# Patient Record
Sex: FEMALE | Race: WHITE | Employment: UNEMPLOYED | URBAN - METROPOLITAN AREA
[De-identification: names, ages, dates, MRNs, and addresses within clinical notes are randomized per-mention and may not be internally consistent; named-entity substitution may affect disease eponyms.]

---

## 2019-01-28 ENCOUNTER — TELEPHONE (OUTPATIENT)
Dept: OBGYN CLINIC | Age: 34
End: 2019-01-28

## 2019-02-19 ENCOUNTER — HOSPITAL ENCOUNTER (EMERGENCY)
Age: 34
Discharge: HOME | End: 2019-02-19
Attending: EMERGENCY MEDICINE
Payer: COMMERCIAL

## 2019-02-19 ENCOUNTER — APPOINTMENT (OUTPATIENT)
Dept: GENERAL RADIOLOGY | Age: 34
End: 2019-02-19
Payer: COMMERCIAL

## 2019-02-19 VITALS
WEIGHT: 160 LBS | OXYGEN SATURATION: 100 % | SYSTOLIC BLOOD PRESSURE: 112 MMHG | HEIGHT: 64 IN | BODY MASS INDEX: 27.31 KG/M2 | HEART RATE: 65 BPM | RESPIRATION RATE: 17 BRPM | TEMPERATURE: 98.4 F | DIASTOLIC BLOOD PRESSURE: 60 MMHG

## 2019-02-19 DIAGNOSIS — R07.89 CHEST WALL PAIN: Primary | ICD-10-CM

## 2019-02-19 LAB
ALBUMIN SERPL-MCNC: 4.3 G/DL (ref 3.5–4.6)
ALP BLD-CCNC: 82 U/L (ref 40–130)
ALT SERPL-CCNC: 12 U/L (ref 0–33)
ANION GAP SERPL CALCULATED.3IONS-SCNC: 11 MEQ/L (ref 9–15)
APTT: 24.3 SEC (ref 21.6–35.4)
AST SERPL-CCNC: 20 U/L (ref 0–35)
BASOPHILS ABSOLUTE: 0 K/UL (ref 0–0.2)
BASOPHILS RELATIVE PERCENT: 0.5 %
BILIRUB SERPL-MCNC: <0.2 MG/DL (ref 0.2–0.7)
BUN BLDV-MCNC: 13 MG/DL (ref 6–20)
CALCIUM SERPL-MCNC: 9 MG/DL (ref 8.5–9.9)
CHLORIDE BLD-SCNC: 102 MEQ/L (ref 95–107)
CO2: 28 MEQ/L (ref 20–31)
CREAT SERPL-MCNC: 0.65 MG/DL (ref 0.5–0.9)
D DIMER: <0.19 MG/L FEU (ref 0–0.5)
EKG ATRIAL RATE: 65 BPM
EKG P AXIS: 54 DEGREES
EKG P-R INTERVAL: 176 MS
EKG Q-T INTERVAL: 376 MS
EKG QRS DURATION: 84 MS
EKG QTC CALCULATION (BAZETT): 391 MS
EKG R AXIS: 44 DEGREES
EKG T AXIS: 26 DEGREES
EKG VENTRICULAR RATE: 65 BPM
EOSINOPHILS ABSOLUTE: 0.1 K/UL (ref 0–0.7)
EOSINOPHILS RELATIVE PERCENT: 1.6 %
GFR AFRICAN AMERICAN: >60
GFR NON-AFRICAN AMERICAN: >60
GLOBULIN: 3.1 G/DL (ref 2.3–3.5)
GLUCOSE BLD-MCNC: 97 MG/DL (ref 70–99)
HCT VFR BLD CALC: 36.7 % (ref 37–47)
HEMOGLOBIN: 12.3 G/DL (ref 12–16)
INR BLD: 1
LYMPHOCYTES ABSOLUTE: 1.8 K/UL (ref 1–4.8)
LYMPHOCYTES RELATIVE PERCENT: 23.4 %
MCH RBC QN AUTO: 28.8 PG (ref 27–31.3)
MCHC RBC AUTO-ENTMCNC: 33.4 % (ref 33–37)
MCV RBC AUTO: 86.2 FL (ref 82–100)
MONOCYTES ABSOLUTE: 0.5 K/UL (ref 0.2–0.8)
MONOCYTES RELATIVE PERCENT: 6.1 %
NEUTROPHILS ABSOLUTE: 5.2 K/UL (ref 1.4–6.5)
NEUTROPHILS RELATIVE PERCENT: 68.4 %
PDW BLD-RTO: 15.3 % (ref 11.5–14.5)
PLATELET # BLD: 199 K/UL (ref 130–400)
POTASSIUM SERPL-SCNC: 3.6 MEQ/L (ref 3.4–4.9)
PROTHROMBIN TIME: 10.3 SEC (ref 9–11.5)
RBC # BLD: 4.25 M/UL (ref 4.2–5.4)
SODIUM BLD-SCNC: 141 MEQ/L (ref 135–144)
TOTAL CK: 78 U/L (ref 0–170)
TOTAL PROTEIN: 7.4 G/DL (ref 6.3–8)
TROPONIN: <0.01 NG/ML (ref 0–0.01)
WBC # BLD: 7.5 K/UL (ref 4.8–10.8)

## 2019-02-19 PROCEDURE — 36415 COLL VENOUS BLD VENIPUNCTURE: CPT

## 2019-02-19 PROCEDURE — 71045 X-RAY EXAM CHEST 1 VIEW: CPT

## 2019-02-19 PROCEDURE — 85730 THROMBOPLASTIN TIME PARTIAL: CPT

## 2019-02-19 PROCEDURE — 85379 FIBRIN DEGRADATION QUANT: CPT

## 2019-02-19 PROCEDURE — 93005 ELECTROCARDIOGRAM TRACING: CPT

## 2019-02-19 PROCEDURE — 82550 ASSAY OF CK (CPK): CPT

## 2019-02-19 PROCEDURE — 99285 EMERGENCY DEPT VISIT HI MDM: CPT

## 2019-02-19 PROCEDURE — 80053 COMPREHEN METABOLIC PANEL: CPT

## 2019-02-19 PROCEDURE — 85610 PROTHROMBIN TIME: CPT

## 2019-02-19 PROCEDURE — 85025 COMPLETE CBC W/AUTO DIFF WBC: CPT

## 2019-02-19 PROCEDURE — 84484 ASSAY OF TROPONIN QUANT: CPT

## 2019-02-19 RX ORDER — GABAPENTIN 800 MG/1
800 TABLET ORAL 3 TIMES DAILY
COMMUNITY
End: 2021-07-09 | Stop reason: ALTCHOICE

## 2019-02-19 RX ORDER — KETOROLAC TROMETHAMINE 10 MG/1
10 TABLET, FILM COATED ORAL EVERY 6 HOURS PRN
Qty: 20 TABLET | Refills: 0 | Status: SHIPPED | OUTPATIENT
Start: 2019-02-19 | End: 2021-07-09 | Stop reason: ALTCHOICE

## 2019-02-19 RX ORDER — BUPRENORPHINE AND NALOXONE 8; 2 MG/1; MG/1
2 FILM, SOLUBLE BUCCAL; SUBLINGUAL DAILY
COMMUNITY
End: 2021-07-09 | Stop reason: ALTCHOICE

## 2019-02-19 RX ORDER — CYCLOBENZAPRINE HCL 10 MG
10 TABLET ORAL 3 TIMES DAILY PRN
Qty: 30 TABLET | Refills: 0 | Status: SHIPPED | OUTPATIENT
Start: 2019-02-19 | End: 2019-03-01

## 2019-02-19 RX ORDER — LAMOTRIGINE 200 MG/1
200 TABLET ORAL DAILY
COMMUNITY
End: 2021-07-09 | Stop reason: ALTCHOICE

## 2019-02-19 RX ORDER — RISPERIDONE 1 MG/1
1.5 TABLET, FILM COATED ORAL 2 TIMES DAILY
COMMUNITY
End: 2021-07-09 | Stop reason: ALTCHOICE

## 2019-02-19 ASSESSMENT — PAIN DESCRIPTION - DESCRIPTORS
DESCRIPTORS: PRESSURE
DESCRIPTORS: ACHING

## 2019-02-19 ASSESSMENT — PAIN DESCRIPTION - LOCATION
LOCATION: CHEST
LOCATION: CHEST

## 2019-02-19 ASSESSMENT — ENCOUNTER SYMPTOMS
RHINORRHEA: 0
CHEST TIGHTNESS: 1
SHORTNESS OF BREATH: 0
NAUSEA: 0
TROUBLE SWALLOWING: 0
ABDOMINAL PAIN: 0
WHEEZING: 0
EYES NEGATIVE: 1
VOMITING: 0
ALLERGIC/IMMUNOLOGIC NEGATIVE: 1

## 2019-02-19 ASSESSMENT — PAIN SCALES - GENERAL
PAINLEVEL_OUTOF10: 7
PAINLEVEL_OUTOF10: 0

## 2019-02-19 ASSESSMENT — PAIN DESCRIPTION - ONSET: ONSET: ON-GOING

## 2019-02-19 ASSESSMENT — PAIN DESCRIPTION - FREQUENCY: FREQUENCY: CONTINUOUS

## 2019-02-19 ASSESSMENT — PAIN DESCRIPTION - PAIN TYPE: TYPE: ACUTE PAIN

## 2021-07-09 ENCOUNTER — OFFICE VISIT (OUTPATIENT)
Dept: FAMILY MEDICINE CLINIC | Age: 36
End: 2021-07-09
Payer: COMMERCIAL

## 2021-07-09 VITALS
WEIGHT: 148.6 LBS | HEIGHT: 64 IN | DIASTOLIC BLOOD PRESSURE: 84 MMHG | OXYGEN SATURATION: 98 % | HEART RATE: 68 BPM | BODY MASS INDEX: 25.37 KG/M2 | RESPIRATION RATE: 16 BRPM | TEMPERATURE: 97.9 F | SYSTOLIC BLOOD PRESSURE: 130 MMHG

## 2021-07-09 DIAGNOSIS — B37.31 CANDIDAL VAGINITIS: ICD-10-CM

## 2021-07-09 DIAGNOSIS — Z86.19 HISTORY OF HERPES GENITALIS: ICD-10-CM

## 2021-07-09 DIAGNOSIS — K27.9 PUD (PEPTIC ULCER DISEASE): Primary | ICD-10-CM

## 2021-07-09 DIAGNOSIS — Z85.850 HX OF PAPILLARY THYROID CARCINOMA: ICD-10-CM

## 2021-07-09 DIAGNOSIS — Z98.84 HISTORY OF BARIATRIC SURGERY: ICD-10-CM

## 2021-07-09 DIAGNOSIS — E89.0 POSTSURGICAL HYPOTHYROIDISM: ICD-10-CM

## 2021-07-09 PROCEDURE — 99204 OFFICE O/P NEW MOD 45 MIN: CPT | Performed by: FAMILY MEDICINE

## 2021-07-09 PROCEDURE — G8419 CALC BMI OUT NRM PARAM NOF/U: HCPCS | Performed by: FAMILY MEDICINE

## 2021-07-09 PROCEDURE — 1036F TOBACCO NON-USER: CPT | Performed by: FAMILY MEDICINE

## 2021-07-09 PROCEDURE — G8427 DOCREV CUR MEDS BY ELIG CLIN: HCPCS | Performed by: FAMILY MEDICINE

## 2021-07-09 RX ORDER — OMEPRAZOLE 20 MG/1
40 CAPSULE, DELAYED RELEASE ORAL DAILY
Qty: 30 CAPSULE | Refills: 1 | Status: SHIPPED | OUTPATIENT
Start: 2021-07-09 | End: 2021-08-21

## 2021-07-09 RX ORDER — FLUOCINOLONE ACETONIDE 0.1 MG/ML
SOLUTION TOPICAL
Qty: 1 BOTTLE | Refills: 0 | Status: SHIPPED | OUTPATIENT
Start: 2021-07-09 | End: 2021-10-05 | Stop reason: SDUPTHER

## 2021-07-09 RX ORDER — LITHIUM CARBONATE 300 MG/1
450 CAPSULE ORAL 2 TIMES DAILY WITH MEALS
Status: ON HOLD | COMMUNITY
End: 2022-01-10 | Stop reason: HOSPADM

## 2021-07-09 RX ORDER — LEVOTHYROXINE SODIUM 0.05 MG/1
50 TABLET ORAL DAILY
Status: ON HOLD | COMMUNITY
End: 2022-01-10 | Stop reason: HOSPADM

## 2021-07-09 RX ORDER — CHOLECALCIFEROL (VITAMIN D3) 1250 MCG
CAPSULE ORAL
Status: ON HOLD | COMMUNITY

## 2021-07-09 RX ORDER — FLUCONAZOLE 150 MG/1
150 TABLET ORAL ONCE
Qty: 1 TABLET | Refills: 1 | Status: SHIPPED | OUTPATIENT
Start: 2021-07-09 | End: 2021-08-02 | Stop reason: SDUPTHER

## 2021-07-09 RX ORDER — OXYBUTYNIN CHLORIDE 5 MG/1
5 TABLET, EXTENDED RELEASE ORAL 2 TIMES DAILY
Status: ON HOLD | COMMUNITY

## 2021-07-09 SDOH — ECONOMIC STABILITY: FOOD INSECURITY: WITHIN THE PAST 12 MONTHS, THE FOOD YOU BOUGHT JUST DIDN'T LAST AND YOU DIDN'T HAVE MONEY TO GET MORE.: NEVER TRUE

## 2021-07-09 SDOH — ECONOMIC STABILITY: FOOD INSECURITY: WITHIN THE PAST 12 MONTHS, YOU WORRIED THAT YOUR FOOD WOULD RUN OUT BEFORE YOU GOT MONEY TO BUY MORE.: NEVER TRUE

## 2021-07-09 ASSESSMENT — PATIENT HEALTH QUESTIONNAIRE - PHQ9
SUM OF ALL RESPONSES TO PHQ9 QUESTIONS 1 & 2: 0
2. FEELING DOWN, DEPRESSED OR HOPELESS: 0
SUM OF ALL RESPONSES TO PHQ QUESTIONS 1-9: 0
SUM OF ALL RESPONSES TO PHQ QUESTIONS 1-9: 0
1. LITTLE INTEREST OR PLEASURE IN DOING THINGS: 0
SUM OF ALL RESPONSES TO PHQ QUESTIONS 1-9: 0

## 2021-07-09 ASSESSMENT — SOCIAL DETERMINANTS OF HEALTH (SDOH): HOW HARD IS IT FOR YOU TO PAY FOR THE VERY BASICS LIKE FOOD, HOUSING, MEDICAL CARE, AND HEATING?: NOT HARD AT ALL

## 2021-07-09 ASSESSMENT — ENCOUNTER SYMPTOMS: ABDOMINAL DISTENTION: 0

## 2021-07-09 NOTE — PROGRESS NOTES
Subjective:      Patient ID: George Miller is a 29 y.o. female    GI Problem  Primary symptoms do not include fever or arthralgias. Other  Pertinent negatives include no arthralgias, fever or weakness. Here to establish. Hx of gastric bypass in past and hx of genital herpes and recurrent yeast vaginal infections of of recent-both dx'd by culture. Requesting refill on acyclovir which she has take chronically to prevent infection  Also has used diflucan several times over the last few months for recurrent yeast infection. .  Also has hx of pud and hypothyroidism. Also has had some problems with scalp rash recently. Seems itchy at times    Review of Systems   Constitutional: Negative for activity change and fever. Gastrointestinal: Negative for abdominal distention. Musculoskeletal: Negative for arthralgias. Neurological: Negative for weakness. Reviewed allergy, medical, social, surgical, family and med list changes and updated   Files     Social History     Socioeconomic History    Marital status: Single     Spouse name: None    Number of children: None    Years of education: None    Highest education level: None   Occupational History    None   Tobacco Use    Smoking status: Never Smoker    Smokeless tobacco: Never Used   Substance and Sexual Activity    Alcohol use: No    Drug use: Yes     Types: Opiates      Comment: pt was on prescription pain medication    Sexual activity: None   Other Topics Concern    None   Social History Narrative    None     Social Determinants of Health     Financial Resource Strain: Low Risk     Difficulty of Paying Living Expenses: Not hard at all   Food Insecurity: No Food Insecurity    Worried About Running Out of Food in the Last Year: Never true    Kristopher of Food in the Last Year: Never true   Transportation Needs:     Lack of Transportation (Medical):      Lack of Transportation (Non-Medical):    Physical Activity:     Days of Exercise per Week:     Minutes of Exercise per Session:    Stress:     Feeling of Stress :    Social Connections:     Frequency of Communication with Friends and Family:     Frequency of Social Gatherings with Friends and Family:     Attends Holiness Services:     Active Member of Clubs or Organizations:     Attends Club or Organization Meetings:     Marital Status:    Intimate Partner Violence:     Fear of Current or Ex-Partner:     Emotionally Abused:     Physically Abused:     Sexually Abused:      Current Outpatient Medications   Medication Sig Dispense Refill    lithium 300 MG capsule Take 450 mg by mouth 2 times daily (with meals)      oxybutynin (DITROPAN-XL) 5 MG extended release tablet Take 5 mg by mouth 2 times daily      Cholecalciferol (VITAMIN D3) 1.25 MG (41316 UT) CAPS Take by mouth      levothyroxine (SYNTHROID) 50 MCG tablet Take 50 mcg by mouth Daily      Multiple Vitamin (MULTI-VITAMIN PO) Take by mouth      omeprazole (PRILOSEC) 20 MG delayed release capsule Take 40 mg by mouth Daily        No current facility-administered medications for this visit. History reviewed. No pertinent family history. Past Medical History:   Diagnosis Date    Anxiety     Bipolar 1 disorder ([facility]     Gastric ulcer     Opiate abuse, continuous (HCC)     Polycystic ovaries      Objective:   /84   Pulse 68   Temp 97.9 °F (36.6 °C)   Resp 16   Ht 5' 4\" (1.626 m)   Wt 148 lb 9.6 oz (67.4 kg)   SpO2 98%   BMI 25.51 kg/m²     Physical Exam  Lungs:Clear  Equal b.s bilaterally  Heart:  Rate reg     No murmur  Back:       No  CVA tenderness  Abdomen: B.S present   Soft           No  Tenderness        No   Rebound                    No hepatosplenomegaly. No masses. Gen:      No acute distress  Heent;  Psoriatic appearing area along occipital region of scalp. No swelling of scalp     Assessment:       Diagnosis Orders   1. PUD (peptic ulcer disease)  CBC Auto Differential   2.  Postsurgical hypothyroidism  TSH without Reflex   3. Hx of papillary thyroid carcinoma     4. History of herpes genitalis     5. History of bariatric surgery  Lipid Panel    Comprehensive Metabolic Panel    CBC Auto Differential    Vitamin B12 & Folate    TSH without Reflex    Iron   6. Candidal vaginitis           Plan:      Orders Placed This Encounter   Medications    omeprazole (PRILOSEC) 20 MG delayed release capsule     Sig: Take 2 capsules by mouth Daily     Dispense:  30 capsule     Refill:  1    fluconazole (DIFLUCAN) 150 MG tablet     Sig: Take 1 tablet by mouth once for 1 dose     Dispense:  1 tablet     Refill:  1    fluocinolone acetonide (SYNALAR) 0.01 % external solution     Sig: Apply topically 2 times daily x 2 weeks . Dispense:  1 Bottle     Refill:  0     Orders Placed This Encounter   Procedures    Lipid Panel     Standing Status:   Future     Standing Expiration Date:   7/9/2022     Order Specific Question:   Is Patient Fasting?/# of Hours     Answer:   8    Comprehensive Metabolic Panel     Standing Status:   Future     Standing Expiration Date:   7/9/2022    CBC Auto Differential     Standing Status:   Future     Standing Expiration Date:   1/9/2022    Vitamin B12 & Folate     Standing Status:   Future     Standing Expiration Date:   7/9/2022    TSH without Reflex     Standing Status:   Future     Standing Expiration Date:   7/9/2022    Iron     Standing Status:   Future     Standing Expiration Date:   7/9/2022     Order Specific Question:   Is Patient Fasting? Answer:   no     Order Specific Question:   No of Hours?      Answer:   0   f/u after blood work done

## 2021-07-15 ENCOUNTER — TELEPHONE (OUTPATIENT)
Dept: FAMILY MEDICINE CLINIC | Age: 36
End: 2021-07-15

## 2021-07-15 DIAGNOSIS — M25.50 MULTIPLE JOINT PAIN: ICD-10-CM

## 2021-07-15 DIAGNOSIS — M25.561 RIGHT KNEE PAIN, UNSPECIFIED CHRONICITY: ICD-10-CM

## 2021-07-15 DIAGNOSIS — M25.542 PAIN IN JOINTS OF LEFT HAND: Primary | ICD-10-CM

## 2021-07-15 DIAGNOSIS — M25.541 PAIN IN JOINTS OF RIGHT HAND: ICD-10-CM

## 2021-07-15 NOTE — TELEPHONE ENCOUNTER
Can order xry of hand for finger pain -make sure not work related. Can get blood work done same day as xray.   Will need few days for follow up appt for blood work and xray

## 2021-07-15 NOTE — TELEPHONE ENCOUNTER
----- Message from Domitila Wilson sent at 7/15/2021 11:07 AM EDT -----  Subject: Message to Provider    QUESTIONS  Information for Provider? Patient has injured her finger a couple of days   ago. Patient wants to know if she can get the blood work order for her   finger and appointment done all on the same day. She will also know if her   PCP could go over both results at her appointment.  ---------------------------------------------------------------------------  --------------  [address]  What is the best way for the office to contact you? OK to leave message on   voicemail  Preferred Call Back Phone Number? [phone]  ---------------------------------------------------------------------------  --------------  SCRIPT ANSWERS  Relationship to Patient?  Self

## 2021-07-15 NOTE — TELEPHONE ENCOUNTER
Spoke to pt is having multiple joint pain will order bilat hand and right knee x-ray along with RA labs. Also per pt discussed at visit gong on daily use for herpes medication but wanted to see labs first  is what you have ordered correct? Will be going to [facility]. So will fax orders.

## 2021-07-16 NOTE — TELEPHONE ENCOUNTER
I already ordered x-rays my question is on the labs did you want anything else that is not already ordered? As patient discussed with you about getting on daily herpes med.

## 2021-07-19 ENCOUNTER — TELEPHONE (OUTPATIENT)
Dept: FAMILY MEDICINE CLINIC | Age: 36
End: 2021-07-19

## 2021-07-20 ENCOUNTER — TELEPHONE (OUTPATIENT)
Dept: FAMILY MEDICINE CLINIC | Age: 36
End: 2021-07-20

## 2021-07-20 NOTE — TELEPHONE ENCOUNTER
We do not have results at this time Pt had them done at [facility] so she will call them to check on.   ----- Message from Feli Robles sent at 7/20/2021  9:14 AM EDT -----  Subject: Results Request    QUESTIONS  Which lab or imaging result is the patient calling about? XRAY's  Which provider ordered the test? [address]   At what location was the test performed? Date the test was performed? 2021-07-19  Additional Information for Provider? Patient would like to see if her   Adamaris Bustard are back for her hands, and knee. She would like to have the   results as soon as possible. Please contact patient with information  #   [phone].  ---------------------------------------------------------------------------  --------------  [facility] INFO  What is the best way for the office to contact you? OK to leave message on   voicemail, OK to respond with electronic message via [employer] portal (only   for patients who have registered [employer] account)  Preferred Call Back Phone Number?  [phone]

## 2021-07-20 NOTE — TELEPHONE ENCOUNTER
Pt calling to get results we do not have as of now   ----- Message from Eddi Maria sent at 7/20/2021  9:11 AM EDT -----  Subject: Results Request    QUESTIONS  Which lab or imaging result is the patient calling about? Lab work   Which provider ordered the test? [address]   At what location was the test performed? Date the test was performed? 2021-07-19  Additional Information for Provider? Patient is calling in to see if her   blood work results were back and if she is approved for the medication for   herpes. Please call patient back with information #[phone].   ---------------------------------------------------------------------------  --------------  CALL BACK INFO  What is the best way for the office to contact you? OK to leave message on   voicemail, OK to respond with electronic message via [employer] portal (only   for patients who have registered [employer] account)  Preferred Call Back Phone Number?  [phone]

## 2021-07-20 NOTE — TELEPHONE ENCOUNTER
Need to get results of blood work and xrays.   Patient does need some type of follow up visit to review testing.--------

## 2021-07-21 ENCOUNTER — TELEPHONE (OUTPATIENT)
Dept: FAMILY MEDICINE CLINIC | Age: 36
End: 2021-07-21

## 2021-07-21 NOTE — TELEPHONE ENCOUNTER
----- Message from Aditya Suarez sent at 7/21/2021 12:59 PM EDT -----  Subject: Message to Provider    QUESTIONS  Information for Provider? Patient called in on 7/20/21 to get blood   results and stated it is in [place] and stated is just confused about   somethings and needs someone to call the pt so that the pt can understand.  ---------------------------------------------------------------------------  --------------  [address]  What is the best way for the office to contact you? OK to leave message on   voicemail  Preferred Call Back Phone Number? [phone]  ---------------------------------------------------------------------------  --------------  SCRIPT ANSWERS  Relationship to Patient?  Self

## 2021-07-23 ENCOUNTER — TELEMEDICINE (OUTPATIENT)
Dept: FAMILY MEDICINE CLINIC | Age: 36
End: 2021-07-23
Payer: COMMERCIAL

## 2021-07-23 DIAGNOSIS — M25.50 MULTIPLE JOINT PAIN: Primary | ICD-10-CM

## 2021-07-23 DIAGNOSIS — F31.9 BIPOLAR AFFECTIVE DISORDER, REMISSION STATUS UNSPECIFIED (HCC): ICD-10-CM

## 2021-07-23 DIAGNOSIS — R19.8 CHANGE IN BOWEL MOVEMENT: ICD-10-CM

## 2021-07-23 DIAGNOSIS — Z86.19 HISTORY OF HERPES GENITALIS: ICD-10-CM

## 2021-07-23 PROCEDURE — 99214 OFFICE O/P EST MOD 30 MIN: CPT | Performed by: FAMILY MEDICINE

## 2021-07-23 RX ORDER — VALACYCLOVIR HYDROCHLORIDE 1 G/1
1000 TABLET, FILM COATED ORAL DAILY
Qty: 30 TABLET | Refills: 0 | Status: SHIPPED | OUTPATIENT
Start: 2021-07-23 | End: 2021-08-23

## 2021-07-23 ASSESSMENT — ENCOUNTER SYMPTOMS
BLOOD IN STOOL: 0
VOMITING: 0
ABDOMINAL PAIN: 1

## 2021-07-23 NOTE — PROGRESS NOTES
Subjective:      Patient ID: Edin Mcnamara is a 29 y.o. female    HPI  Here in follow up from recent blood work. Has continued to have problems with intermittent hand pain and knee pain and other joint pain. Has had some long standing intermittent abdominal pain and stools seem smaller in caliber more recently. No emesis. No blood in stool. Requesting new psychiatrist referral for her bipolar disorder. Review of Systems   Constitutional: Negative for fever. Gastrointestinal: Positive for abdominal pain. Negative for blood in stool and vomiting. Neurological: Negative for dizziness and weakness. Reviewed allergy, medical, social, surgical, family and med list changes and updated   Files--reviewed xrys with mild arthritic changes of knees and fingers and blood work-which does not include all blood work ordered    Edin Mcnamara is a 29 y.o. female evaluated via telephone on 7/23/2021. Consent:  She and/or health care decision maker is aware that that she may receive a bill for this telephone service, depending on her insurance coverage, and has provided verbal consent to proceed: Yes      Documentation:  I communicated with the patient and/or health care decision maker about  Details of this discussion including any medical advice provided: This visit was a telephone encounter. Patient was located at their home. Provider was located at their ___ home or        __x__ office. I affirm this is a Patient Initiated Episode with an Established Patient who has not had a related appointment within my department in the past 7 days or scheduled within the next 24 hours.     Total Time: minutes: 11-20 minutes    Note: not billable if this call serves to triage the patient into an appointment for the relevant concern      Maciej Rodgers MD   Social History     Socioeconomic History    Marital status: Single     Spouse name: None    Number of children: None    Years of education: None    Highest education level: None   Occupational History    None   Tobacco Use    Smoking status: Never Smoker    Smokeless tobacco: Never Used   Substance and Sexual Activity    Alcohol use: No    Drug use: Yes     Types: Opiates      Comment: pt was on prescription pain medication    Sexual activity: None   Other Topics Concern    None   Social History Narrative    None     Social Determinants of Health     Financial Resource Strain: Low Risk     Difficulty of Paying Living Expenses: Not hard at all   Food Insecurity: No Food Insecurity    Worried About Running Out of Food in the Last Year: Never true    Kristopher of Food in the Last Year: Never true   Transportation Needs:     Lack of Transportation (Medical):      Lack of Transportation (Non-Medical):    Physical Activity:     Days of Exercise per Week:     Minutes of Exercise per Session:    Stress:     Feeling of Stress :    Social Connections:     Frequency of Communication with Friends and Family:     Frequency of Social Gatherings with Friends and Family:     Attends Scientology Services:     Active Member of Clubs or Organizations:     Attends Club or Organization Meetings:     Marital Status:    Intimate Partner Violence:     Fear of Current or Ex-Partner:     Emotionally Abused:     Physically Abused:     Sexually Abused:      Current Outpatient Medications   Medication Sig Dispense Refill    lithium 300 MG capsule Take 450 mg by mouth 2 times daily (with meals)      oxybutynin (DITROPAN-XL) 5 MG extended release tablet Take 5 mg by mouth 2 times daily      Cholecalciferol (VITAMIN D3) 1.25 MG (82267 UT) CAPS Take by mouth      levothyroxine (SYNTHROID) 50 MCG tablet Take 50 mcg by mouth Daily      Multiple Vitamin (MULTI-VITAMIN PO) Take by mouth      omeprazole (PRILOSEC) 20 MG delayed release capsule Take 2 capsules by mouth Daily 30 capsule 1    fluocinolone acetonide (SYNALAR) 0.01 % external solution

## 2021-08-02 RX ORDER — FLUCONAZOLE 150 MG/1
150 TABLET ORAL ONCE
Qty: 1 TABLET | Refills: 1 | Status: SHIPPED | OUTPATIENT
Start: 2021-08-02 | End: 2021-08-02

## 2021-08-02 NOTE — TELEPHONE ENCOUNTER
----- Message from Frankey Duran sent at 8/2/2021  1:34 PM EDT -----  Subject: Results Request    QUESTIONS  Which lab or imaging result is the patient calling about? labs  Which provider ordered the test?   At what location was the test performed? Date the test was performed? Additional Information for Provider?   ---------------------------------------------------------------------------  --------------  CALL BACK INFO  What is the best way for the office to contact you? OK to leave message on   voicemail  Preferred Call Back Phone Number?  [phone]

## 2021-08-03 ENCOUNTER — TELEPHONE (OUTPATIENT)
Dept: FAMILY MEDICINE CLINIC | Age: 36
End: 2021-08-03

## 2021-08-03 NOTE — TELEPHONE ENCOUNTER
----- Message from Fay Tamez sent at 8/3/2021  3:49 PM EDT -----  Subject: Referral Request    QUESTIONS   Reason for referral request? Patient called to get a referral to OBGYN. Please return her call. Has the physician seen you for this condition before? Yes  Select a date? 2021-07-09  Select the Provider the patient wants to be referred to, if known (PCP or   Specialist)? Outside Physician - anyone   Preferred Specialist (if applicable)? Do you already have an appointment scheduled? No  Additional Information for Provider? Robby Jensen or Yahaira locations for   [employer] only patent states  ---------------------------------------------------------------------------  --------------  [employer] INFO  What is the best way for the office to contact you? OK to leave message on   voicemail  Preferred Call Back Phone Number?  [phone]

## 2021-08-04 DIAGNOSIS — B37.31 YEAST INFECTION OF THE VAGINA: ICD-10-CM

## 2021-08-04 DIAGNOSIS — Z86.19 HISTORY OF HERPES GENITALIS: Primary | ICD-10-CM

## 2021-08-09 ENCOUNTER — TELEPHONE (OUTPATIENT)
Dept: FAMILY MEDICINE CLINIC | Age: 36
End: 2021-08-09

## 2021-08-09 DIAGNOSIS — M25.50 MULTIPLE JOINT PAIN: Primary | ICD-10-CM

## 2021-08-09 NOTE — TELEPHONE ENCOUNTER
----- Message from Dean Gray sent at 8/9/2021 12:44 PM EDT -----  Subject: Referral Request    QUESTIONS   Reason for referral request? x-ray for hand   Has the physician seen you for this condition before? Yes  Select a date? 2021-07-23  Select the Provider the patient wants to be referred to, if known (PCP or   Specialist)? Rob Baron   Preferred Specialist (if applicable)? Do you already have an appointment scheduled? No  Additional Information for Provider? patient is going to be at the office   today at 2:30 and wanted to know if she can come in and get an x-ray done   for her ongoing condition in her hands. ---------------------------------------------------------------------------  --------------  [facility] ticckle  What is the best way for the office to contact you? OK to leave message on   voicemail  Preferred Call Back Phone Number?  [phone]

## 2021-08-09 NOTE — TELEPHONE ENCOUNTER
Referral to Rheumatology has been placed and [facility] message sent to patient.

## 2021-08-09 NOTE — TELEPHONE ENCOUNTER
Patient stated she did have Xrays done a month ago at [facility] but now states her left wrist is swollen and has a lump on the outside of it. She didn't know if you would do another Xray or if you can refer her to someone. She states she gets inflammation in different places all over her body and states that's not normal. She is concerned with Rheumatoid Arthritis. Please advise.

## 2021-08-09 NOTE — TELEPHONE ENCOUNTER
patient is going to be at the office   today at 2:30 and wanted to know if she can come in and get an x-ray done   for her ongoing condition in her hands.

## 2021-08-09 NOTE — TELEPHONE ENCOUNTER
Would hold on xrys if she had no injury for now but would create rheumatology referral   dx multiple joint pain--may need to have some type of follow up appt for this?

## 2021-08-10 ENCOUNTER — TELEPHONE (OUTPATIENT)
Dept: FAMILY MEDICINE CLINIC | Age: 36
End: 2021-08-10

## 2021-08-10 NOTE — TELEPHONE ENCOUNTER
Talked to Olayinka Magaña today and she states no one has given her lab results. Then she also has a question about the xray that she had done as well. Please call the patient.

## 2021-08-20 ENCOUNTER — PATIENT MESSAGE (OUTPATIENT)
Dept: FAMILY MEDICINE CLINIC | Age: 36
End: 2021-08-20

## 2021-08-20 NOTE — TELEPHONE ENCOUNTER
From: Av Gaines  To: Adolfo Alcaraz MD  Sent: 8/20/2021 1:24 PM EDT  Subject: Prescription Question    Hello , I need a refill on my omeprazole. The pharmacy says I'm out of refills . Thanks .

## 2021-08-21 RX ORDER — OMEPRAZOLE 20 MG/1
CAPSULE, DELAYED RELEASE ORAL
Qty: 30 CAPSULE | Refills: 1 | Status: ON HOLD | OUTPATIENT
Start: 2021-08-21

## 2021-08-23 RX ORDER — VALACYCLOVIR HYDROCHLORIDE 1 G/1
TABLET, FILM COATED ORAL
Qty: 30 TABLET | Refills: 0 | Status: SHIPPED | OUTPATIENT
Start: 2021-08-23 | End: 2021-08-31 | Stop reason: SDUPTHER

## 2021-08-31 ENCOUNTER — PATIENT MESSAGE (OUTPATIENT)
Dept: FAMILY MEDICINE CLINIC | Age: 36
End: 2021-08-31

## 2021-08-31 ENCOUNTER — VIRTUAL VISIT (OUTPATIENT)
Dept: FAMILY MEDICINE CLINIC | Age: 36
End: 2021-08-31
Payer: COMMERCIAL

## 2021-08-31 DIAGNOSIS — M79.672 CHRONIC PAIN IN LEFT FOOT: Primary | ICD-10-CM

## 2021-08-31 DIAGNOSIS — G89.29 CHRONIC PAIN IN LEFT FOOT: Primary | ICD-10-CM

## 2021-08-31 DIAGNOSIS — Z76.0 MEDICATION REFILL: ICD-10-CM

## 2021-08-31 PROCEDURE — 99213 OFFICE O/P EST LOW 20 MIN: CPT | Performed by: FAMILY MEDICINE

## 2021-08-31 RX ORDER — VALACYCLOVIR HYDROCHLORIDE 1 G/1
TABLET, FILM COATED ORAL
Qty: 21 TABLET | Refills: 0 | Status: SHIPPED | OUTPATIENT
Start: 2021-08-31 | End: 2021-09-21

## 2021-08-31 NOTE — PROGRESS NOTES
Subjective:      Patient ID: Lea Jensen is a 29 y.o. female    HPI  Here in follow up of sorts. Has had trauma to left foot in past.  Has had chronic foot pain  But foot pain worse recently. Not able to stand very long without having pain. Is getting swelling of foot periodically. Was given referral to specialist in [place] but wishing to get appt somewhat closer. Requesting referral.  Jennifer Pressley may have to have note for work as having difficulties with duties at work recently because of pain. Requesting refill on valtrex. Tolerating this well but insurance only covering 21 day supply at a time? Review of Systems   Constitutional: Positive for activity change. Negative for unexpected weight change. Musculoskeletal: Positive for arthralgias. Neurological: Negative for weakness. Lea Jensen is a 29 y.o. female evaluated via telephone on 8/31/2021. Consent:  She and/or health care decision maker is aware that that she may receive a bill for this telephone service, depending on her insurance coverage, and has provided verbal consent to proceed: Yes      Documentation:  I communicated with the patient and/or health care decision maker about . Details of this discussion including any medical advice provided: This visit was a telephone encounter. Patient was located at their home. Provider was located at their ___ home or        __x__ office. I affirm this is a Patient Initiated Episode with an Established Patient who has not had a related appointment within my department in the past 7 days or scheduled within the next 24 hours.     Total Time: minutes: 11-20 minutes    Note: not billable if this call serves to triage the patient into an appointment for the relevant concern      Lila Donohue MD   Reviewed allergy, medical, social, surgical, family and med list changes and updated   Files     Social History     Socioeconomic History    Marital status: Single     Spouse name: None    Number of children: None    Years of education: None    Highest education level: None   Occupational History    None   Tobacco Use    Smoking status: Never Smoker    Smokeless tobacco: Never Used   Substance and Sexual Activity    Alcohol use: No    Drug use: Yes     Types: Opiates      Comment: pt was on prescription pain medication    Sexual activity: None   Other Topics Concern    None   Social History Narrative    None     Social Determinants of Health     Financial Resource Strain: Low Risk     Difficulty of Paying Living Expenses: Not hard at all   Food Insecurity: No Food Insecurity    Worried About Running Out of Food in the Last Year: Never true    Kristopher of Food in the Last Year: Never true   Transportation Needs:     Lack of Transportation (Medical):      Lack of Transportation (Non-Medical):    Physical Activity:     Days of Exercise per Week:     Minutes of Exercise per Session:    Stress:     Feeling of Stress :    Social Connections:     Frequency of Communication with Friends and Family:     Frequency of Social Gatherings with Friends and Family:     Attends Jewish Services:     Active Member of Clubs or Organizations:     Attends Club or Organization Meetings:     Marital Status:    Intimate Partner Violence:     Fear of Current or Ex-Partner:     Emotionally Abused:     Physically Abused:     Sexually Abused:      Current Outpatient Medications   Medication Sig Dispense Refill    valACYclovir (VALTREX) 1 g tablet TAKE 1 TABLET BY MOUTH EVERY DAY 30 tablet 0    omeprazole (PRILOSEC) 20 MG delayed release capsule TAKE 2 CAPSULES BY MOUTH EVERY DAY 30 capsule 1    lithium 300 MG capsule Take 450 mg by mouth 2 times daily (with meals)      oxybutynin (DITROPAN-XL) 5 MG extended release tablet Take 5 mg by mouth 2 times daily      Cholecalciferol (VITAMIN D3) 1.25 MG (49166 UT) CAPS Take by mouth      levothyroxine (SYNTHROID) 50 MCG tablet Take 50 mcg by mouth Daily      Multiple Vitamin (MULTI-VITAMIN PO) Take by mouth      fluocinolone acetonide (SYNALAR) 0.01 % external solution Apply topically 2 times daily x 2 weeks . 1 Bottle 0     No current facility-administered medications for this visit. History reviewed. No pertinent family history. Past Medical History:   Diagnosis Date    Anxiety     Bipolar 1 disorder ([facility] [address]     Gastric ulcer     [facility]     Polycystic ovaries      Objective: There were no vitals taken for this visit. Physical Exam  No exam done   Assessment:       Diagnosis Orders   1. Chronic pain in left foot  [facility] [place], DPM, Podiatry, Yessy/Yahaira   2.  Medication refill           Plan:    stressed importance off follow up with specialists   Orders Placed This Encounter   Procedures   [facility] [place], DPM, Podiatry, Yessy/Yahaira     Referral Priority:   Urgent     Referral Type:   Eval and Treat     Referral Reason:   Specialty Services Required     Referred to Provider:   Fede Grimaldo DPM     Requested Specialty:   Podiatry     Number of Visits Requested:   1     Orders Placed This Encounter   Medications    valACYclovir (VALTREX) 1 g tablet     Sig: TAKE 1 TABLET BY MOUTH EVERY DAY     Dispense:  21 tablet     Refill:  0   f/u per maintenance

## 2021-08-31 NOTE — LETTER
[facility]  [address] [address]  Phone: [phone]  Fax: [phone]    Adolfo Alcaraz MD        [address]21    [place] [address] [address] [facility] [address] [place] [address]   [address]    To whom it may concern:    Please excuse Garcia from work till 21. If you have any questions or concerns, please don't hesitate to call.     Sincerely,        Adolfo Alcaraz MD

## 2021-08-31 NOTE — TELEPHONE ENCOUNTER
From: Fidelina Lilly  To: Abby Giles MD  Sent: 8/31/2021 1:42 PM EDT  Subject: Non-Urgent Medical Question    The doctor said he was going to give me a work note for a week how do I receive that note?

## 2021-09-06 ENCOUNTER — PATIENT MESSAGE (OUTPATIENT)
Dept: FAMILY MEDICINE CLINIC | Age: 36
End: 2021-09-06

## 2021-09-09 RX ORDER — EPINEPHRINE 0.3 MG/.3ML
INJECTION SUBCUTANEOUS
Qty: 1 EACH | Refills: 1 | Status: SHIPPED | OUTPATIENT
Start: 2021-09-09 | End: 2022-03-22 | Stop reason: SDUPTHER

## 2021-09-09 NOTE — TELEPHONE ENCOUNTER
From: Smallaa  To: Renu Guthrie MD  Sent: 9/6/2021 10:39 PM EDT  Subject: Prescription Question    Can you please send a rx for a epiPen to the pharmacy ? [facility] . Can someone let me know if it gets sent or not? Thank you.

## 2021-09-14 DIAGNOSIS — R10.9 ABDOMINAL PAIN, UNSPECIFIED ABDOMINAL LOCATION: Primary | ICD-10-CM

## 2021-09-21 RX ORDER — VALACYCLOVIR HYDROCHLORIDE 1 G/1
TABLET, FILM COATED ORAL
Qty: 21 TABLET | Refills: 1 | Status: ON HOLD | OUTPATIENT
Start: 2021-09-21 | End: 2022-01-10 | Stop reason: HOSPADM

## 2021-09-25 ENCOUNTER — PATIENT MESSAGE (OUTPATIENT)
Dept: FAMILY MEDICINE CLINIC | Age: 36
End: 2021-09-25

## 2021-10-02 NOTE — TELEPHONE ENCOUNTER
From: Albert Perry  To: Singh Townsend MD  Sent: 9/25/2021 5:33 AM EDT  Subject: Prescription Question    Hello, I would like a refill on my scalp treatment please. I have attached a image of the treatment. Can somebody please let me know if I can get a refill or if one is being sent to the pharmacy. Thank you and have a good day.

## 2021-10-05 RX ORDER — FLUOCINOLONE ACETONIDE 0.1 MG/ML
SOLUTION TOPICAL
Qty: 1 EACH | Refills: 0 | Status: SHIPPED | OUTPATIENT
Start: 2021-10-05 | End: 2021-11-18 | Stop reason: SDUPTHER

## 2021-10-21 ENCOUNTER — PATIENT MESSAGE (OUTPATIENT)
Dept: FAMILY MEDICINE CLINIC | Age: 36
End: 2021-10-21

## 2021-10-21 RX ORDER — SUMATRIPTAN 50 MG/1
50 TABLET, FILM COATED ORAL
Qty: 9 TABLET | Refills: 0 | Status: SHIPPED | OUTPATIENT
Start: 2021-10-21 | End: 2021-11-15

## 2021-10-21 NOTE — TELEPHONE ENCOUNTER
From: Guerita Brown  To: Chapin Gabriel MD  Sent: 10/21/2021 10:42 AM EDT  Subject: Prescription Question    Hello, I was wondering if the doctor could send me something to the pharmacy for migraines. ? I was also going to mention them at our upcoming appointment. Thanks.

## 2021-10-27 ENCOUNTER — VIRTUAL VISIT (OUTPATIENT)
Dept: FAMILY MEDICINE CLINIC | Age: 36
End: 2021-10-27
Payer: COMMERCIAL

## 2021-10-27 DIAGNOSIS — G89.29 CHRONIC NONINTRACTABLE HEADACHE, UNSPECIFIED HEADACHE TYPE: Primary | ICD-10-CM

## 2021-10-27 DIAGNOSIS — R51.9 CHRONIC NONINTRACTABLE HEADACHE, UNSPECIFIED HEADACHE TYPE: Primary | ICD-10-CM

## 2021-10-27 PROCEDURE — 99213 OFFICE O/P EST LOW 20 MIN: CPT | Performed by: FAMILY MEDICINE

## 2021-10-27 RX ORDER — SUMATRIPTAN 100 MG/1
100 TABLET, FILM COATED ORAL
Qty: 9 TABLET | Refills: 0 | Status: ON HOLD | OUTPATIENT
Start: 2021-10-27 | End: 2022-01-10 | Stop reason: HOSPADM

## 2021-10-27 ASSESSMENT — ENCOUNTER SYMPTOMS
NAUSEA: 0
COUGH: 0
VOMITING: 0

## 2021-10-27 NOTE — PROGRESS NOTES
Subjective:      Patient ID: Arnaud Reis is a 29 y.o. female    HPI  Here with more problems related to headaches recently. Has hx of migraines. Headaches more often and more severe. imitrex has been helpful  Tolerating imitrex well but having to take 2nd dose 2 hours later  Which was prescribed. Review of Systems   Constitutional: Negative for chills and fever. Respiratory: Negative for cough. Gastrointestinal: Negative for nausea and vomiting. Neurological: Negative for syncope. Reviewed allergy, medical, social, surgical, family and med list changes and updated   Files       TELEHEALTH EVALUATION -- Audio/Visual (During ORDZK-60 public health emergency)    -   Arnaud Reis is a 29 y.o. female being evaluated by a Virtual Visit (video visit) encounter to address concerns as mentioned above. A caregiver was present when appropriate. Due to this being a TeleHealth encounter (During KDXFD-26 public health emergency), evaluation of the following organ systems was limited: Vitals/Constitutional/EENT/Resp/CV/GI//MS/Neuro/Skin/Heme-Lymph-Imm. Pursuant to the emergency declaration under the [address] [place] [address], [address] [place] [address] authority and the [employer] Act, this Virtual Visit was conducted with patient's (and/or legal guardian's) consent, to reduce the patient's risk of exposure to COVID-19 and provide necessary medical care. The patient (and/or legal guardian) has also been advised to contact this office for worsening conditions or problems, and seek emergency medical treatment and/or call 911 if deemed necessary. Services were provided through a video synchronous discussion virtually to substitute for in-person clinic visit. Type of encounter was _x_ Doxy __ MyChart ___Facetime    Patient was located at their home. Provider was located at their ___ home or        ___x_ office.      --Liza Limon MD TABLET BY MOUTH EVERY DAY (INSURANCE LIMITS TO 21 TABS PER 30 DAYS) 21 tablet 1    EPINEPHrine (EPIPEN 2-SANTHOSH) 0.3 MG/0.3ML SOAJ injection Use as directed for allergic reaction 1 each 1    omeprazole (PRILOSEC) 20 MG delayed release capsule TAKE 2 CAPSULES BY MOUTH EVERY DAY 30 capsule 1    lithium 300 MG capsule Take 450 mg by mouth 2 times daily (with meals)      oxybutynin (DITROPAN-XL) 5 MG extended release tablet Take 5 mg by mouth 2 times daily      Cholecalciferol (VITAMIN D3) 1.25 MG (43286 UT) CAPS Take by mouth      levothyroxine (SYNTHROID) 50 MCG tablet Take 50 mcg by mouth Daily      Multiple Vitamin (MULTI-VITAMIN PO) Take by mouth       No current facility-administered medications for this visit. No family history on file. Past Medical History:   Diagnosis Date    Anxiety     [facility] ([facility]     Gastric ulcer     [facility]     Polycystic ovaries      Objective: There were no vitals taken for this visit. Physical Exam  Gen; No acute distress  Neuro; No focal deficits. No facial asymmetries;    Pulmonary    Respirations unlabored    Mental status; Alert. Awake. Assessment:          Diagnosis Orders   1.  Chronic nonintractable headache, unspecified headache type  External Referral to Neurology         Plan:    increase imitrex   Orders Placed This Encounter   Medications    SUMAtriptan (IMITREX) 100 MG tablet     Sig: Take 1 tablet by mouth once as needed for Migraine     Dispense:  9 tablet     Refill:  0     Orders Placed This Encounter   Procedures    External Referral to Neurology     Referral Priority:   Routine     Referral Type:   Eval and Treat     Referral Reason:   Specialty Services Required     Requested Specialty:   Acute Care     Number of Visits Requested:   1       And will do neurology referral   F/u in few months

## 2021-11-16 ENCOUNTER — PATIENT MESSAGE (OUTPATIENT)
Dept: FAMILY MEDICINE CLINIC | Age: 36
End: 2021-11-16

## 2021-11-17 ENCOUNTER — OFFICE VISIT (OUTPATIENT)
Dept: GASTROENTEROLOGY | Age: 36
End: 2021-11-17
Payer: COMMERCIAL

## 2021-11-17 VITALS
BODY MASS INDEX: 24.2 KG/M2 | SYSTOLIC BLOOD PRESSURE: 104 MMHG | WEIGHT: 141 LBS | HEART RATE: 66 BPM | RESPIRATION RATE: 12 BRPM | DIASTOLIC BLOOD PRESSURE: 60 MMHG

## 2021-11-17 DIAGNOSIS — R10.9 ABDOMINAL PAIN, UNSPECIFIED ABDOMINAL LOCATION: Primary | ICD-10-CM

## 2021-11-17 PROCEDURE — 99204 OFFICE O/P NEW MOD 45 MIN: CPT | Performed by: INTERNAL MEDICINE

## 2021-11-17 PROCEDURE — G8484 FLU IMMUNIZE NO ADMIN: HCPCS | Performed by: INTERNAL MEDICINE

## 2021-11-17 PROCEDURE — 1036F TOBACCO NON-USER: CPT | Performed by: INTERNAL MEDICINE

## 2021-11-17 PROCEDURE — G8427 DOCREV CUR MEDS BY ELIG CLIN: HCPCS | Performed by: INTERNAL MEDICINE

## 2021-11-17 PROCEDURE — G8420 CALC BMI NORM PARAMETERS: HCPCS | Performed by: INTERNAL MEDICINE

## 2021-11-17 RX ORDER — DIVALPROEX SODIUM 250 MG/1
TABLET, DELAYED RELEASE ORAL
Status: ON HOLD | COMMUNITY
Start: 2021-11-03 | End: 2022-01-10 | Stop reason: HOSPADM

## 2021-11-17 RX ORDER — ALUMINUM ZIRCONIUM OCTACHLOROHYDREX GLY 16 G/100G
1 GEL TOPICAL DAILY
Qty: 30 CAPSULE | Refills: 1 | Status: SHIPPED | OUTPATIENT
Start: 2021-11-17 | End: 2021-12-17

## 2021-11-17 RX ORDER — SODIUM, POTASSIUM,MAG SULFATES 17.5-3.13G
SOLUTION, RECONSTITUTED, ORAL ORAL
Qty: 177 ML | Refills: 0 | Status: ON HOLD | OUTPATIENT
Start: 2021-11-17 | End: 2022-01-10 | Stop reason: HOSPADM

## 2021-11-17 RX ORDER — OMEPRAZOLE 40 MG/1
40 CAPSULE, DELAYED RELEASE ORAL
Qty: 90 CAPSULE | Refills: 1 | Status: ON HOLD | OUTPATIENT
Start: 2021-11-17 | End: 2022-01-10 | Stop reason: HOSPADM

## 2021-11-17 ASSESSMENT — ENCOUNTER SYMPTOMS
RECTAL PAIN: 0
COLOR CHANGE: 0
TROUBLE SWALLOWING: 0
VOMITING: 0
CONSTIPATION: 0
DIARRHEA: 0
ABDOMINAL DISTENTION: 0
SHORTNESS OF BREATH: 0
ABDOMINAL PAIN: 1
NAUSEA: 0
WHEEZING: 0
VOICE CHANGE: 0
EYE REDNESS: 0
CHEST TIGHTNESS: 0
PHOTOPHOBIA: 0
BLOOD IN STOOL: 0
EYE PAIN: 0

## 2021-11-17 NOTE — PROGRESS NOTES
Subjective:      Patient ID: Jordin Abdul is a 29 y.o. female who presents today for:  Chief Complaint   Patient presents with    Abdominal Pain       HPI  This is a very pleasant 27-year-old who came in today for further evaluation management. Patient mentioned that she has gastric bypass years back at [employer] and subsequently she had an ulcer she mentioned she was scoped previously and was found to have also been maintained on Prilosec. She described abdominal pain periumbilical area as well as epigastric area. Pain has been for the last 6 months intermittent in nature. She does also endorse loose stool intermittently. Had an episode blood per rectum almost a month ago that resolved. No nausea vomiting reported. No diarrhea per se reported today. Patient came in today for the evaluation management of abdominal pain. Pain is not necessarily related to diet. She was prescribed probiotic with improvement of symptoms.   Otherwise patient came in today to establish care and for diabetes management  Past Medical History:   Diagnosis Date    Anxiety     B[facility] disorder ([facility]     Gastric ulcer     Opiate abuse, continuous ([facility]     Polycystic ovaries      Past Surgical History:   Procedure Laterality Date    CHOLECYSTECTOMY      COSMETIC SURGERY      GASTRIC BYPASS SURGERY       Social History     Socioeconomic History    Marital status: Single     Spouse name: Not on file    Number of children: Not on file    Years of education: Not on file    Highest education level: Not on file   Occupational History    Not on file   Tobacco Use    Smoking status: Never Smoker    Smokeless tobacco: Never Used   Substance and Sexual Activity    Alcohol use: No    Drug use: Yes     Types: Opiates      Comment: pt was on prescription pain medication    Sexual activity: Not on file   Other Topics Concern    Not on file   Social History Narrative    Not on file     Social Determinants of Health Financial Resource Strain: Low Risk     Difficulty of Paying Living Expenses: Not hard at all   Food Insecurity: No Food Insecurity    Worried About Running Out of Food in the Last Year: Never true    Kristopher of Food in the Last Year: Never true   Transportation Needs:     Lack of Transportation (Medical): Not on file    Lack of Transportation (Non-Medical): Not on file   Physical Activity:     Days of Exercise per Week: Not on file    Minutes of Exercise per Session: Not on file   Stress:     Feeling of Stress : Not on file   Social Connections:     Frequency of Communication with Friends and Family: Not on file    Frequency of Social Gatherings with Friends and Family: Not on file    Attends Jewish Services: Not on file    Active Member of [address] [place] [address] [employer] or Organizations: Not on file    Attends Club or Organization Meetings: Not on file    Marital Status: Not on file   Intimate Partner Violence:     Fear of Current or Ex-Partner: Not on file    Emotionally Abused: Not on file    Physically Abused: Not on file    Sexually Abused: Not on file   Housing Stability:     Unable to Pay for Housing in the Last Year: Not on file    Number of [place] in the Last Year: Not on file    Unstable Housing in the Last Year: Not on file     No family history on file. Allergies   Allergen Reactions    Bee Venom      Other reaction(s): Anaphylactic Shock    Other      Other reaction(s): anaphylaxis         Review of Systems   Constitutional: Negative for appetite change, chills, fatigue, fever and unexpected weight change. HENT: Negative for nosebleeds, tinnitus, trouble swallowing and voice change. Eyes: Negative for photophobia, pain and redness. Respiratory: Negative for chest tightness, shortness of breath and wheezing. Cardiovascular: Negative for chest pain, palpitations and leg swelling. Gastrointestinal: Positive for abdominal pain.  Negative for abdominal distention, blood in stool, constipation, diarrhea, nausea, rectal pain and vomiting. Endocrine: Negative for polydipsia, polyphagia and polyuria. Genitourinary: Negative for difficulty urinating and hematuria. Skin: Negative for color change, pallor and rash. Neurological: Negative for dizziness, speech difficulty and headaches. Psychiatric/Behavioral: Negative for confusion and suicidal ideas. Objective:   /60 (Site: Right Upper Arm, Position: Sitting, Cuff Size: Small Adult)   Pulse 66   Resp 12   Wt 141 lb (64 kg)   BMI 24.20 kg/m²     Physical Exam  Constitutional:       General: She is not in acute distress. Appearance: She is well-developed. HENT:      Head: Normocephalic and atraumatic. Eyes:      Conjunctiva/sclera: Conjunctivae normal.      Pupils: Pupils are equal, round, and reactive to light. Cardiovascular:      Rate and Rhythm: Normal rate and regular rhythm. Heart sounds: Normal heart sounds. Pulmonary:      Effort: Pulmonary effort is normal. No respiratory distress. Breath sounds: Normal breath sounds. No wheezing or rales. Abdominal:      General: Bowel sounds are normal. There is no distension. Palpations: Abdomen is soft. Abdomen is not rigid. There is no hepatomegaly, splenomegaly or mass. Tenderness: There is no abdominal tenderness. There is no guarding or rebound. Musculoskeletal:         General: No tenderness or deformity. Normal range of motion. Cervical back: Neck supple. Skin:     Coloration: Skin is not pale. Findings: No erythema or rash. Neurological:      Mental Status: She is alert and oriented to person, place, and time.          Laboratory, Pathology, Radiology reviewed in detail with relevantimportant investigations summarized below:  Lab Results   Component Value Date    WBC 7.5 02/19/2019    WBC 8.2 10/24/2016    HGB 12.3 02/19/2019    HGB 13.7 10/24/2016    HCT 36.7 02/19/2019    HCT 40.6 10/24/2016    MCV 86.2 02/19/2019    MCV 90.7 10/24/2016     02/19/2019     10/24/2016    . Lab Results   Component Value Date    ALT 12 02/19/2019    ALT 37 10/24/2016    AST 20 02/19/2019    AST 23 10/24/2016    ALKPHOS 82 02/19/2019    ALKPHOS 89 10/24/2016    BILITOT <0.2 02/19/2019    BILITOT 0.48 10/24/2016       No results found. No results found for: IRON, TIBC, FERRITIN  Lab Results   Component Value Date    INR 1.0 02/19/2019     No components found for: ACUTEHEPATITISSCREEN  No components found for: CELIACPANEL  No components found for: STOOLCULTURE, C.DIFF, STOOLOVAPARASITE, STOOLLEUCOCYTE        Assessment:      1- Abdominal pain  Patient mentions that she been on PPI for gastric ulcer? That was noted after the gastric bypass surgery remote marginal ulcer  At this time, will proceed upper endoscopy for further evaluation. Will obtain gastric biopsies to assess for H. Pylori  Will renew PPI as requested  Patient mentioned also that was prescribed probiotic with some improvement of symptoms. Will reorder probiotics and assess response  Patient described epigastric and periumbilical pain. And had an episode of blood per rectum 1 month prior to current visit. Will obtain colonoscopy with terminal ileum intubation for comprehensive assessment. Patient also mentioned that she had extensive work-up at [facility].  We will request blood work for review  2- Associated medical conditions including but not limited to history of opioid abuse-will assess lab work upon obtaining from [facility], anxiety/bipolar disorder, polycystic ovaries, history of gastric bypass      sReturn in about 6 weeks (around 12/29/2021) for Post procedure results discussion, further management.       Yana Vila MD

## 2021-11-18 RX ORDER — FLUOCINOLONE ACETONIDE 0.1 MG/ML
SOLUTION TOPICAL
Qty: 1 EACH | Refills: 0 | Status: ON HOLD | OUTPATIENT
Start: 2021-11-18 | End: 2022-01-10 | Stop reason: HOSPADM

## 2021-11-29 ENCOUNTER — TELEPHONE (OUTPATIENT)
Dept: GASTROENTEROLOGY | Age: 36
End: 2021-11-29

## 2021-11-29 NOTE — TELEPHONE ENCOUNTER
The patient patient said she will call back to schedule her procedure, her prep was to much money. The patient said she had her last blood work done at [facility] in [place].

## 2021-11-30 ENCOUNTER — TELEPHONE (OUTPATIENT)
Dept: GASTROENTEROLOGY | Age: 36
End: 2021-11-30

## 2021-11-30 NOTE — TELEPHONE ENCOUNTER
Patient want labs results reviewed.  Patient had labs done 7/19/21 at [facility] and labs are scanned in her chart

## 2021-12-22 DIAGNOSIS — Z86.19 HISTORY OF HERPES GENITALIS: Primary | ICD-10-CM

## 2021-12-22 RX ORDER — ACYCLOVIR 200 MG/1
200 CAPSULE ORAL
Qty: 25 CAPSULE | Refills: 0 | Status: SHIPPED | OUTPATIENT
Start: 2021-12-22 | End: 2021-12-27

## 2021-12-22 RX ORDER — ACYCLOVIR 400 MG/1
400 TABLET ORAL 2 TIMES DAILY
Qty: 60 TABLET | Refills: 0 | Status: ON HOLD | OUTPATIENT
Start: 2021-12-22 | End: 2022-01-10 | Stop reason: HOSPADM

## 2021-12-31 ENCOUNTER — HOSPITAL ENCOUNTER (INPATIENT)
Age: 36
LOS: 10 days | Discharge: HOME | DRG: 753 | End: 2022-01-10
Attending: PSYCHIATRY & NEUROLOGY | Admitting: PSYCHIATRY & NEUROLOGY
Payer: COMMERCIAL

## 2021-12-31 PROBLEM — R45.851 DEPRESSION WITH SUICIDAL IDEATION: Status: ACTIVE | Noted: 2021-12-31

## 2021-12-31 PROBLEM — F31.64 BIPOLAR AFFECTIVE DISORDER, MIXED, SEVERE, WITH PSYCHOTIC BEHAVIOR (HCC): Status: ACTIVE | Noted: 2021-12-31

## 2021-12-31 PROBLEM — F32.A DEPRESSION WITH SUICIDAL IDEATION: Status: ACTIVE | Noted: 2021-12-31

## 2021-12-31 PROCEDURE — 1240000000 HC EMOTIONAL WELLNESS R&B

## 2021-12-31 PROCEDURE — 99223 1ST HOSP IP/OBS HIGH 75: CPT | Performed by: PSYCHIATRY & NEUROLOGY

## 2021-12-31 PROCEDURE — APPSS60 APP SPLIT SHARED TIME 46-60 MINUTES: Performed by: PSYCHIATRY & NEUROLOGY

## 2021-12-31 PROCEDURE — 6370000000 HC RX 637 (ALT 250 FOR IP): Performed by: PSYCHIATRY & NEUROLOGY

## 2021-12-31 RX ORDER — HALOPERIDOL 5 MG/ML
5 INJECTION INTRAMUSCULAR EVERY 4 HOURS PRN
Status: DISCONTINUED | OUTPATIENT
Start: 2021-12-31 | End: 2022-01-10 | Stop reason: HOSPADM

## 2021-12-31 RX ORDER — POLYETHYLENE GLYCOL 3350 17 G/17G
17 POWDER, FOR SOLUTION ORAL DAILY PRN
Status: DISCONTINUED | OUTPATIENT
Start: 2021-12-31 | End: 2022-01-10 | Stop reason: HOSPADM

## 2021-12-31 RX ORDER — ACETAMINOPHEN 325 MG/1
650 TABLET ORAL EVERY 4 HOURS PRN
Status: DISCONTINUED | OUTPATIENT
Start: 2021-12-31 | End: 2022-01-10 | Stop reason: HOSPADM

## 2021-12-31 RX ORDER — ACYCLOVIR 200 MG/1
400 CAPSULE ORAL ONCE
Status: COMPLETED | OUTPATIENT
Start: 2021-12-31 | End: 2021-12-31

## 2021-12-31 RX ORDER — MAGNESIUM HYDROXIDE/ALUMINUM HYDROXICE/SIMETHICONE 120; 1200; 1200 MG/30ML; MG/30ML; MG/30ML
30 SUSPENSION ORAL EVERY 6 HOURS PRN
Status: DISCONTINUED | OUTPATIENT
Start: 2021-12-31 | End: 2022-01-10 | Stop reason: HOSPADM

## 2021-12-31 RX ORDER — LORAZEPAM 1 MG/1
2 TABLET ORAL EVERY 4 HOURS PRN
Status: DISCONTINUED | OUTPATIENT
Start: 2021-12-31 | End: 2022-01-10 | Stop reason: HOSPADM

## 2021-12-31 RX ORDER — LORAZEPAM 2 MG/ML
2 INJECTION INTRAMUSCULAR EVERY 4 HOURS PRN
Status: DISCONTINUED | OUTPATIENT
Start: 2021-12-31 | End: 2022-01-10 | Stop reason: HOSPADM

## 2021-12-31 RX ORDER — IBUPROFEN 400 MG/1
400 TABLET ORAL EVERY 6 HOURS PRN
Status: DISCONTINUED | OUTPATIENT
Start: 2021-12-31 | End: 2022-01-01

## 2021-12-31 RX ORDER — NICOTINE 21 MG/24HR
1 PATCH, TRANSDERMAL 24 HOURS TRANSDERMAL DAILY
Status: DISCONTINUED | OUTPATIENT
Start: 2021-12-31 | End: 2022-01-10 | Stop reason: HOSPADM

## 2021-12-31 RX ORDER — TRAZODONE HYDROCHLORIDE 50 MG/1
50 TABLET ORAL NIGHTLY PRN
Status: DISCONTINUED | OUTPATIENT
Start: 2021-12-31 | End: 2022-01-10 | Stop reason: HOSPADM

## 2021-12-31 RX ORDER — HYDROXYZINE 50 MG/1
50 TABLET, FILM COATED ORAL 3 TIMES DAILY PRN
Status: DISCONTINUED | OUTPATIENT
Start: 2021-12-31 | End: 2022-01-10 | Stop reason: HOSPADM

## 2021-12-31 RX ORDER — NAPROXEN 250 MG/1
250 TABLET ORAL 2 TIMES DAILY WITH MEALS
Status: DISCONTINUED | OUTPATIENT
Start: 2021-12-31 | End: 2021-12-31

## 2021-12-31 RX ORDER — HALOPERIDOL 5 MG
5 TABLET ORAL EVERY 4 HOURS PRN
Status: DISCONTINUED | OUTPATIENT
Start: 2021-12-31 | End: 2022-01-10 | Stop reason: HOSPADM

## 2021-12-31 RX ORDER — LEVOTHYROXINE SODIUM 0.05 MG/1
50 TABLET ORAL DAILY
Status: DISCONTINUED | OUTPATIENT
Start: 2021-12-31 | End: 2022-01-01

## 2021-12-31 RX ORDER — DIPHENHYDRAMINE HYDROCHLORIDE 50 MG/ML
50 INJECTION INTRAMUSCULAR; INTRAVENOUS EVERY 4 HOURS PRN
Status: DISCONTINUED | OUTPATIENT
Start: 2021-12-31 | End: 2022-01-10 | Stop reason: HOSPADM

## 2021-12-31 RX ADMIN — HYDROXYZINE HYDROCHLORIDE 50 MG: 50 TABLET, FILM COATED ORAL at 09:04

## 2021-12-31 RX ADMIN — HYDROXYZINE HYDROCHLORIDE 50 MG: 50 TABLET, FILM COATED ORAL at 15:36

## 2021-12-31 RX ADMIN — ACYCLOVIR 400 MG: 200 CAPSULE ORAL at 18:13

## 2021-12-31 ASSESSMENT — LIFESTYLE VARIABLES: HISTORY_ALCOHOL_USE: NO

## 2021-12-31 ASSESSMENT — SLEEP AND FATIGUE QUESTIONNAIRES
DIFFICULTY STAYING ASLEEP: YES
AVERAGE NUMBER OF SLEEP HOURS: 3
DIFFICULTY ARISING: NO
SLEEP PATTERN: DIFFICULTY FALLING ASLEEP;DISTURBED/INTERRUPTED SLEEP;RESTLESSNESS
DO YOU HAVE DIFFICULTY SLEEPING: YES
DIFFICULTY FALLING ASLEEP: YES
DO YOU USE A SLEEP AID: NO
RESTFUL SLEEP: NO

## 2021-12-31 NOTE — BH NOTE
Patient signed the voluntary form on 12/31/2021. Patient refused to sign other consents at this time due to beng tired. Patient stated she will sign in the morning.

## 2021-12-31 NOTE — PLAN OF CARE
[facility]  Initial Interdisciplinary Treatment Plan NO      Original treatment plan Date & Time: 12/31/2021  0757    Admission Type:  Admission Type: Voluntary    Reason for admission:   Reason for Admission: Increase in racing thoughts, paranoia, and auditory hallucinations    Estimated Length of Stay:  5-7days  Estimated Discharge Date: to be determined by physician    PATIENT STRENGTHS:  Patient Strengths:Strengths: Positive Support,Connection to output provider,Medication Compliance  Patient Strengths and Limitations:Limitations: Hopeless about future  Addictive Behavior: Addictive Behavior  In the past 3 months, have you felt or has someone told you that you have a problem with:  : None  Do you have a history of Chemical Use?: No  Do you have a history of Alcohol Use?: No  Do you have a history of Street Drug Abuse?: No  Histroy of Prescripton Drug Abuse?: No  Medical Problems:  Past Medical History:   Diagnosis Date    Anxiety     Bipolar 1 disorder ([facility]     Gastric ulcer     [facility]     Polycystic ovaries      Status EXAM:Status and Exam  Normal: No  Facial Expression: Sad,Worried  Affect: Appropriate  Level of Consciousness: Alert  Mood:Normal: No  Mood: Depressed,Anxious,Suspicious,Sad,Helpless,Empty  Motor Activity:Normal: Yes  Interview Behavior: Cooperative  Preception: [place] to Person,[place] to Time,[place] to Place,[place] to Situation  Attention:Normal: No  Attention: Distractible  Thought Processes: Circumstantial  Thought Content:Normal: No  Thought Content: Preoccupations,Paranoia  Hallucinations: Auditory (Comment)  Delusions: Yes  Delusions:  Other(See Comment) (paranoid)  Memory:Normal: Yes  Insight and Judgment: No  Insight and Judgment: Poor Judgment,Poor Insight  Present Suicidal Ideation: No  Present Homicidal Ideation: No    EDUCATION:   Learner Progress Toward Treatment Goals: reviewed group plans and strategies for care    Method:group therapy,

## 2021-12-31 NOTE — GROUP NOTE
Group Therapy Note    Date: 12/31/2021    Group Start Time: 0900  Group End Time: 0915  Group Topic: Group Therapy    SUPRIYA Meier        Group Therapy Note    Attendees: 12/19         patient refused to attend goal group at 0900 after encouragement from staff.  1:1 talk time offered but refused    Signature:  Aida Swift

## 2021-12-31 NOTE — BH NOTE
[facility]  Admission Note     Admission Type:   Admission Type: Voluntary    Reason for admission:  Reason for Admission: Increase in racing thoughts, paranoia, and auditory hallucinations    PATIENT STRENGTHS:  Strengths: Positive Support,Connection to output provider,Medication Compliance    Patient Strengths and Limitations:  Limitations: Hopeless about future    Addictive Behavior:   Addictive Behavior  In the past 3 months, have you felt or has someone told you that you have a problem with:  : None  Do you have a history of Chemical Use?: No  Do you have a history of Alcohol Use?: No  Do you have a history of Street Drug Abuse?: No  Histroy of Prescripton Drug Abuse?: No    Medical Problems:   Past Medical History:   Diagnosis Date    Anxiety     Bipolar 1 disorder ([facility]     Gastric ulcer     Opiate abuse, continuous ([facility]     Polycystic ovaries        Status EXAM:  Status and Exam  Normal: No  Facial Expression: Sad,Worried  Affect: Appropriate  Level of Consciousness: Alert  Mood:Normal: No  Mood: Depressed,Anxious,Suspicious,Sad,Helpless,Empty  Motor Activity:Normal: Yes  Interview Behavior: Cooperative  Preception: [place] to Person,[place] to Time,[place] to Place,[place] to Situation  Attention:Normal: No  Attention: Distractible  Thought Processes: Circumstantial  Thought Content:Normal: No  Thought Content: Preoccupations,Paranoia  Hallucinations: Auditory (Comment)  Delusions: Yes  Delusions:  Other(See Comment) (paranoid)  Memory:Normal: Yes  Insight and Judgment: No  Insight and Judgment: Poor Judgment,Poor Insight  Present Suicidal Ideation: No  Present Homicidal Ideation: No    Tobacco Screening:  Practical Counseling, on admission, carlos alberto X, if applicable and completed (first 3 are required if patient doesn't refuse):            ( )  Recognizing danger situations (included triggers and roadblocks)                    ( )  Coping skills (new ways to manage stress, exercise, relaxation techniques, changing routine, distraction)                                                           ( )  Basic information about quitting (benefits of quitting, techniques in how to quit, available resources  ( ) Referral for counseling faxed to [facility]                                           ( x) Patient refused counseling  ( ) Patient has not smoked in the last 30 days    Metabolic Screening:    No results found for: LABA1C    No results found for: CHOL  No results found for: TRIG  No results found for: HDL  No components found for: LDLCAL  No results found for: LABVLDL      Body mass index is 24.72 kg/m². BP Readings from Last 2 Encounters:   12/31/21 102/60   11/17/21 104/60           Pt admitted with followings belongings:        Patient's home medications were reviewed, need verified. Patient oriented to surroundings and program expectations and copy of patient rights given. Received admission packet:  no.  Consents reviewed, signed no. Refused yes. Patient verbalize understanding:  yes. Patient education on precautions: yes    Patient admitted from [place] after the patient has been experiencing an increase in racing thoughts, paranoia, and auditory hallucinations. Patient was tearful and overwhelmed during her assessment but stated that she \"really needs the help because she can't live another day like this\". Patient was having suicidal thoughts, no plan, earlier in the day but denied them upon admission. The patient only signed the voluntary form and her medications need verified.                     Abigail Henry RN

## 2021-12-31 NOTE — BH NOTE
Patient given tobacco quitline number 51977211259 at this time, refusing to call at this time, states \" I just dont want to quit now\"- patient given information as to the dangers of long term tobacco use. Continue to reinforce the importance of tobacco cessation.

## 2021-12-31 NOTE — BH NOTE
Patient listed these as her home medications and stated there might be more:     Lithium   Ditropan  Synthroid  Depakote   Invega - next shot on January 8th   Multivitamin - given d/t gastric bypass   Vitamin D - given on Tuesdays   Protonix   Probiotic - pt states this was prescribed to her, not over the counter

## 2021-12-31 NOTE — GROUP NOTE
Group Therapy Note    Date: 12/31/2021    Group Start Time: 1400  Group End Time: 5162  Group Topic: Cognitive Skills    SUPRIYA Colón, CTRS    Pt did not attend 1400 cognitive skills group d/t resting in room despite staff invitation to attend. 1:1 talk time offered as alternative to group session, pt declined.           Signature:  Verna Dutta

## 2021-12-31 NOTE — BH NOTE
Patient transported to unit by two transporters. Patient signed voluntary form before stepping unto the unit. Patient had one small bag of belongings. Patient was cooperative.

## 2021-12-31 NOTE — PROGRESS NOTES
Behavioral Services  Medicare Certification Upon Admission    I certify that this patient's inpatient psychiatric hospital admission is medically necessary for:    [x] (1) Treatment which could reasonably be expected to improve this patient's condition,       [x] (2) Or for diagnostic study;     AND     [x](2) The inpatient psychiatric services are provided while the individual is under the care of a physician and are included in the individualized plan of care.     Estimated length of stay/service 3-6 days    Plan for post-hospital care -outpatient care    Electronically signed by Thad Dunaway MD on 12/31/2021 at 6:25 PM

## 2021-12-31 NOTE — GROUP NOTE
Group Therapy Note    Date: 12/31/2021    Group Start Time: 1030  Group End Time: 1120  Group Topic: Psychoeducation    SUPRIYA BHI MELI Interiano LSW        Group Therapy Note    Attendees: 10/19       Patient was offered group therapy today but declined to participate despite encouragement from staff. 1:1 was offered.       Signature:  MELI Driscoll LSW

## 2021-12-31 NOTE — PLAN OF CARE
Problem: Altered Mood, Psychotic Behavior:  Goal: Absence of self-harm  Description: Absence of self-harm  12/31/2021 1655 by Zana Campos  Outcome: Ongoing  Note: No self harm behaviors noted. Patient admits to suicidal ideation with no plan and verbally agrees to approach staff if thoughts of self harm arises. Patient admits to auditory hallucinations of people saying they are after her. Patient admits to increased depression and anxiety. Patient is isolative to self and room. Reassurance and support provided. Q15 minute checks maintained. Patient remains safe at this time.
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
## 2022-01-01 PROCEDURE — 99232 SBSQ HOSP IP/OBS MODERATE 35: CPT | Performed by: PSYCHIATRY & NEUROLOGY

## 2022-01-01 PROCEDURE — 6370000000 HC RX 637 (ALT 250 FOR IP): Performed by: PSYCHIATRY & NEUROLOGY

## 2022-01-01 PROCEDURE — APPSS45 APP SPLIT SHARED TIME 31-45 MINUTES: Performed by: PSYCHIATRY & NEUROLOGY

## 2022-01-01 PROCEDURE — 1240000000 HC EMOTIONAL WELLNESS R&B

## 2022-01-01 RX ORDER — OXYBUTYNIN CHLORIDE 5 MG/1
5 TABLET, EXTENDED RELEASE ORAL 2 TIMES DAILY
Status: DISCONTINUED | OUTPATIENT
Start: 2022-01-01 | End: 2022-01-10 | Stop reason: HOSPADM

## 2022-01-01 RX ORDER — PALIPERIDONE 3 MG/1
3 TABLET, EXTENDED RELEASE ORAL DAILY
Status: DISCONTINUED | OUTPATIENT
Start: 2022-01-01 | End: 2022-01-01

## 2022-01-01 RX ORDER — DIVALPROEX SODIUM 500 MG/1
500 TABLET, DELAYED RELEASE ORAL EVERY 8 HOURS SCHEDULED
Status: DISCONTINUED | OUTPATIENT
Start: 2022-01-01 | End: 2022-01-09

## 2022-01-01 RX ORDER — ACETAMINOPHEN, ASPIRIN AND CAFFEINE 250; 250; 65 MG/1; MG/1; MG/1
1 TABLET, FILM COATED ORAL EVERY 6 HOURS PRN
Status: DISCONTINUED | OUTPATIENT
Start: 2022-01-01 | End: 2022-01-10 | Stop reason: HOSPADM

## 2022-01-01 RX ORDER — DIVALPROEX SODIUM 500 MG/1
500 TABLET, DELAYED RELEASE ORAL EVERY 8 HOURS SCHEDULED
Status: DISCONTINUED | OUTPATIENT
Start: 2022-01-01 | End: 2022-01-01

## 2022-01-01 RX ORDER — LEVOTHYROXINE SODIUM 0.05 MG/1
50 TABLET ORAL DAILY
Status: DISCONTINUED | OUTPATIENT
Start: 2022-01-02 | End: 2022-01-10 | Stop reason: HOSPADM

## 2022-01-01 RX ORDER — SUMATRIPTAN 100 MG/1
100 TABLET, FILM COATED ORAL
Status: DISCONTINUED | OUTPATIENT
Start: 2022-01-01 | End: 2022-01-01

## 2022-01-01 RX ORDER — LEVOTHYROXINE SODIUM 0.05 MG/1
50 TABLET ORAL DAILY
Status: DISCONTINUED | OUTPATIENT
Start: 2022-01-01 | End: 2022-01-01

## 2022-01-01 RX ORDER — CARBOXYMETHYLCELLULOSE SODIUM 5 MG/ML
1 SOLUTION/ DROPS OPHTHALMIC 2 TIMES DAILY PRN
Status: DISCONTINUED | OUTPATIENT
Start: 2022-01-01 | End: 2022-01-10 | Stop reason: HOSPADM

## 2022-01-01 RX ORDER — SUMATRIPTAN 100 MG/1
100 TABLET, FILM COATED ORAL
Status: COMPLETED | OUTPATIENT
Start: 2022-01-01 | End: 2022-01-01

## 2022-01-01 RX ORDER — PALIPERIDONE 6 MG/1
6 TABLET, EXTENDED RELEASE ORAL DAILY
Status: DISCONTINUED | OUTPATIENT
Start: 2022-01-02 | End: 2022-01-10 | Stop reason: HOSPADM

## 2022-01-01 RX ORDER — NAPROXEN 250 MG/1
250 TABLET ORAL 4 TIMES DAILY PRN
Status: DISCONTINUED | OUTPATIENT
Start: 2022-01-01 | End: 2022-01-10 | Stop reason: HOSPADM

## 2022-01-01 RX ADMIN — ACETAMINOPHEN 650 MG: 325 TABLET, FILM COATED ORAL at 05:38

## 2022-01-01 RX ADMIN — LEVOTHYROXINE SODIUM 50 MCG: 0.05 TABLET ORAL at 11:41

## 2022-01-01 RX ADMIN — DIVALPROEX SODIUM 500 MG: 500 TABLET, DELAYED RELEASE ORAL at 13:34

## 2022-01-01 RX ADMIN — CARBOXYMETHYLCELLULOSE SODIUM 1 DROP: 5 SOLUTION/ DROPS OPHTHALMIC at 23:04

## 2022-01-01 RX ADMIN — SUMATRIPTAN SUCCINATE 100 MG: 100 TABLET ORAL at 23:06

## 2022-01-01 RX ADMIN — LITHIUM CARBONATE 450 MG: 300 CAPSULE, GELATIN COATED ORAL at 11:41

## 2022-01-01 RX ADMIN — LITHIUM CARBONATE 450 MG: 300 CAPSULE, GELATIN COATED ORAL at 16:40

## 2022-01-01 RX ADMIN — DIVALPROEX SODIUM 500 MG: 500 TABLET, DELAYED RELEASE ORAL at 23:04

## 2022-01-01 RX ADMIN — PALIPERIDONE 3 MG: 3 TABLET, EXTENDED RELEASE ORAL at 11:45

## 2022-01-01 RX ADMIN — LEVOTHYROXINE SODIUM 50 MCG: 0.05 TABLET ORAL at 05:38

## 2022-01-01 RX ADMIN — OXYBUTYNIN CHLORIDE 5 MG: 5 TABLET, EXTENDED RELEASE ORAL at 11:45

## 2022-01-01 RX ADMIN — HYDROXYZINE HYDROCHLORIDE 50 MG: 50 TABLET, FILM COATED ORAL at 05:38

## 2022-01-01 RX ADMIN — OXYBUTYNIN CHLORIDE 5 MG: 5 TABLET, EXTENDED RELEASE ORAL at 23:04

## 2022-01-01 ASSESSMENT — PAIN SCALES - GENERAL
PAINLEVEL_OUTOF10: 0
PAINLEVEL_OUTOF10: 0
PAINLEVEL_OUTOF10: 10
PAINLEVEL_OUTOF10: 10
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
## 2022-01-01 NOTE — PLAN OF CARE
Problem: Altered Mood, Psychotic Behavior:  Goal: Absence of self-harm  Description: Absence of self-harm  12/31/2021 2352 by Payton Cruz RN  Outcome: Ongoing     Problem: Altered Mood, Psychotic Behavior:  Goal: Able to verbalize decrease in frequency and intensity of hallucinations  Description: Able to verbalize decrease in frequency and intensity of hallucinations  Outcome: Ongoing   Patient continues to endorse audio hallucinations and paranoia, reports not feeling well this evening. Remains isolative to bed and self.

## 2022-01-01 NOTE — PLAN OF CARE
Problem: Altered Mood, Psychotic Behavior:  Goal: Able to verbalize decrease in frequency and intensity of hallucinations  Description: Able to verbalize decrease in frequency and intensity of hallucinations  1/1/2022 0950 by Rob Agudelo LPN  Outcome: Ongoing  Note: Patient reports fleeting suicidal ideations with no plan but contracts for safety. Also, patient reports having nightmares and negative auditory hallucinations but would not elaborate any further. Patient has a flat and sad affect with impaired concentration. Patient isolates to room but verbalizes feeling safe. 1:1 talk time provided and programming encouraged. Problem: Altered Mood, Psychotic Behavior:  Goal: Absence of self-harm  Description: Absence of self-harm  1/1/2022 0950 by Rob Agudelo LPN  Outcome: Ongoing     Problem: Tobacco Use:  Goal: Inpatient tobacco use cessation counseling participation  Description: Inpatient tobacco use cessation counseling participation  Outcome: Ongoing  Note: Patient declines any tobacco cessation literature.

## 2022-01-01 NOTE — GROUP NOTE
Group Therapy Note    Date: 1/1/2022    Group Start Time: 0900  Group End Time: 0915  Group Topic: Community Meeting    SUPRIYA Sebastian        Group Therapy Note    Pt did not attend community meeting group d/t resting in room despite staff invitation to attend. 1:1 talk time offered as alternative to group session, pt declined.

## 2022-01-01 NOTE — PLAN OF CARE
[facility]  Initial Interdisciplinary Treatment Plan NO      Original treatment plan Date & Time: 1/1/2022   0858    Admission Type:  Admission Type: Voluntary    Reason for admission:   Reason for Admission: Increase in racing thoughts, paranoia, and auditory hallucinations    Estimated Length of Stay:  5-7days  Estimated Discharge Date: to be determined by physician    PATIENT STRENGTHS:  Patient Strengths:Strengths: Positive Support,Connection to output provider,Medication Compliance  Patient Strengths and Limitations:Limitations: Difficult relationships / poor social skills,Difficulty problem solving/relies on others to help solve problems,Lacks leisure interests,Multiple barriers to leisure interests  Addictive Behavior: Addictive Behavior  In the past 3 months, have you felt or has someone told you that you have a problem with:  : None  Do you have a history of Chemical Use?: No  Do you have a history of Alcohol Use?: No  Do you have a history of Street Drug Abuse?: No  Histroy of Prescripton Drug Abuse?: No  Medical Problems:  Past Medical History:   Diagnosis Date    Anxiety     Bipolar 1 disorder ([facility]     Bipolar affective disorder, mixed, severe, with psychotic behavior ([facility] 12/31/2021    Gastric ulcer     Opiate abuse, continuous ([facility]     Polycystic ovaries      Status EXAM:Status and Exam  Normal: No  Facial Expression: Flat  Affect: Appropriate  Level of Consciousness: Alert  Mood:Normal: No  Mood: Anxious,Sad  Motor Activity:Normal: Yes  Interview Behavior: Cooperative  Preception: Almond to Person,Almond to Time,Almond to Place,Almond to Situation  Attention:Normal: No  Attention: Distractible  Thought Processes: Circumstantial  Thought Content:Normal: No  Thought Content: Preoccupations,Paranoia  Hallucinations:  Auditory (Comment)  Delusions: Yes  Delusions:  (paranoid)  Memory:Normal: Yes  Insight and Judgment: No  Insight and Judgment: Poor Insight  Present Suicidal Ideation: No  Present Homicidal Ideation: No    EDUCATION:   Learner Progress Toward Treatment Goals: reviewed group plans and strategies for care    Method:group therapy, medication compliance, individualized assessments and care planning    Outcome: needs reinforcement    PATIENT GOALS: to be discussed with patient within 72 hours    PLAN/TREATMENT RECOMMENDATIONS:     continue group therapy , medications compliance, goal setting, individualized assessments and care, continue to monitor pt on unit      SHORT-TERM GOALS:   Time frame for Short-Term Goals: 5-7 days    LONG-TERM GOALS:  Time frame for Long-Term Goals: 6 months  Members Present in Team Meeting: See Signature Sheet    Valeria Hwang

## 2022-01-01 NOTE — GROUP NOTE
Group Therapy Note    Date: 1/1/2022    Group Start Time: 1400  Group End Time: 7631  Group Topic: Music Therapy    [facility] D    Tracie         Group Therapy Note    Pt did not attend music therapy group d/t resting in room despite staff invitation to attend. 1:1 talk time offered as alternative to group session, pt declined.

## 2022-01-01 NOTE — GROUP NOTE
HS Goal Group   Date: December 31, 2021     Patient did not participate in HS goal group. 1:1 talk time was offered as an alternative to group. Will continue to encourage patient to participate in unit programming.      Signature: FEDERICO Marin

## 2022-01-01 NOTE — PLAN OF CARE
5 [facility]  Day 3 Interdisciplinary Treatment Plan NOTE    Review Date & Time: 1/1/2022   1612    Admission Type:   Admission Type: Voluntary    Reason for admission:  Reason for Admission: Increase in racing thoughts, paranoia, and auditory hallucinations  Estimated Length of Stay: 5-7 days  Estimated Discharge Date Update: to be determined by physician    PATIENT STRENGTHS:  Patient Strengths Strengths: Positive Support,Connection to output provider,Medication Compliance  Patient Strengths and Limitations:Limitations: Difficult relationships / poor social skills,Difficulty problem solving/relies on others to help solve problems,Lacks leisure interests,Multiple barriers to leisure interests  Addictive Behavior:Addictive Behavior  In the past 3 months, have you felt or has someone told you that you have a problem with:  : None  Do you have a history of Chemical Use?: No  Do you have a history of Alcohol Use?: No  Do you have a history of Street Drug Abuse?: No  Histroy of Prescripton Drug Abuse?: No  Medical Problems:  Past Medical History:   Diagnosis Date    Anxiety     Bipolar 1 disorder ([facility]     Bipolar affective disorder, mixed, severe, with psychotic behavior ([facility] 12/31/2021    Gastric ulcer     Opiate abuse, continuous ([facility]     Polycystic ovaries        Risk:  Fall RiskTotal: 53  Raciel Scale Raciel Scale Score: 22  BVC    Change in scores no Changes to plan of Care no    Status EXAM:   Status and Exam  Normal: No  Facial Expression: Flat  Affect: Appropriate  Level of Consciousness: Alert  Mood:Normal: No  Mood: Anxious,Sad  Motor Activity:Normal: Yes  Interview Behavior: Cooperative  Preception: Fleming to Person,Fleming to Time,Fleming to Place  Attention:Normal: No  Attention: Distractible  Thought Processes: Circumstantial  Thought Content:Normal: No  Thought Content: Preoccupations  Hallucinations:  Auditory (Comment)  Delusions: Yes  Delusions:  (paranoid)  Memory:Normal: Yes  Insight and Judgment: No  Insight and Judgment: Poor Insight  Present Suicidal Ideation: No  Present Homicidal Ideation: No    Daily Assessment Last Entry:   Daily Sleep (WDL): Within Defined Limits         Patient Currently in Pain: Denies  Daily Nutrition (WDL): Within Defined Limits    Patient Monitoring:  Frequency of Checks: 4 times per hour, close    Psychiatric Symptoms:   Depression Symptoms  Depression Symptoms: Isolative,Loss of interest  Anxiety Symptoms  Anxiety Symptoms: Generalized  Bambi Symptoms  Bambi Symptoms: No problems reported or observed.      Psychosis Symptoms  Delusion Type: Paranoid    Suicide Risk CSSR-S:  1) Within the past month, have you wished you were dead or wished you could go to sleep and not wake up? : No  2) Have you actually had any thoughts of killing yourself? : No  6) Have you ever done anything, started to do anything, or prepared to do anything to end your life?: No  Change in Result NO Change in Plan of care NO      EDUCATION:   EDUCATION:   Learner Progress Toward Treatment Goals: Reviewed results and recommendations of this team, Reviewed group plan and strategies, Reviewed signs, symptoms and risk of self harm and violent behavior, Reviewed goals and plan of care    Method:small group, individual verbal education    Outcome:verbalized by patient, but needs reinforcement to obtain goals    PATIENT GOALS:  Short term:Patient refused treatment team  Long term:Patient refused treatment team    PLAN/TREATMENT RECOMMENDATIONS UPDATE: continue with group therapies, increased socialization, continue planning for after discharge goals, continue with medication compliance    SHORT-TERM GOALS UPDATE:   Time frame for Short-Term Goals: 5-7 days    LONG-TERM GOALS UPDATE:   Time frame for Long-Term Goals: 6 months  Members Present in Team Meeting: See Signature Sheet    Subhash Castañeda

## 2022-01-01 NOTE — PROGRESS NOTES
Daily Progress Note  1/1/2022    Patient Name: Echo Elizabeth    CHIEF COMPLAINT: Suicidal ideation due to auditory hallucinations command in nature         SUBJECTIVE:    Nursing staff report the patient has maintained medication adherence and has been without acute behavioral changes since admission. Merlin Ensign continues to endorse significant suicidal ideation with auditory hallucinations. She explains that she has tried to remain busy and keep  to self distracted from the voices. She has several questions regarding her medication regimen and has requested something for dry eyes to maintain her contacts in place, protein shakes after we have discussed decreased BUN as it relates to her gastric sleeve and dietary intake. She explains she eats very little meat as it is difficult to digest.  Additionally she has requested Excedrin Migraine and naproxen as needed for tooth pain. We have worked with the nursing team and added a notation on all medications that request that she be allowed to have her medications 30 to 45 minutes before meal time as she reports her digestion is well-established and important to maintain her current metabolism and weight. Merlin Ensign is tearful at times and obviously is getting overwhelmed with questions however with encouragement and support she agrees to continue to reach out to the team.    Writer encouraged patient to attend groups on the unit as she has isolated on and off throughout the day. At this time, the patient is not appropriate for a lower level of care. There is risk of decompensation and patient warrants further hospitalization for safety and stabilization. Appetite:  [] Normal/Adequate/Unchanged  [] Increased  [x] Decreased      Sleep:       [] Normal/Adequate/Unchanged  [x] Fair  [] Poor      Group Attendance on Unit:   [] Yes  [] Selectively    [x] No    Medication Side Effects:  Patient denies any medication side effects at the time of assessment.          Mental Status Exam  Level of consciousness: Alert and awake. Appearance: Appropriate attire for setting, standing, with fair  grooming and hygiene. Behavior/Motor: Approachable, tearful at times no psychomotor abnormalities. Attitude toward examiner: Cooperative, attentive, good eye contact. Speech: Less slurred today presenting with normal rate, normal volume, normal tone. Mood:  Patient reports \" tough day\". Patient presents depressed  Affect: Blunted  Thought processes: Linear, goal directed and coherent. Thought content: Denies homicidal ideation. Suicidal Ideation: Active suicidal ideations, without current plan or intent, contracts for safety on the unit. Delusions: No evidence of delusions. Denies paranoia. Perceptual Disturbance: Patient does not appear to be responding to internal stimuli. Endorses auditory hallucinations. Denies visual hallucinations. Cognition: Oriented to self, location, time, and situation. Memory: Age appropriate. Insight & Judgement: Poor. Data   height is 5' 4\" (1.626 m) and weight is 144 lb (65.3 kg). Her temporal temperature is 97.6 °F (36.4 °C). Her blood pressure is 90/69 and her pulse is 84. Her respiration is 14. Labs:   No visits with results within 2 Day(s) from this visit. Latest known visit with results is:   Admission on 02/19/2019, Discharged on 02/19/2019   Component Date Value Ref Range Status    Ventricular Rate 02/19/2019 65  BPM Final    Atrial Rate 02/19/2019 65  BPM Final    P-R Interval 02/19/2019 176  ms Final    QRS Duration 02/19/2019 84  ms Final    Q-T Interval 02/19/2019 376  ms Final    QTc Calculation (Bazett) 02/19/2019 391  ms Final    P Axis 02/19/2019 54  degrees Final    R Axis 02/19/2019 44  degrees Final    T Axis 02/19/2019 26  degrees Final    Sodium 02/19/2019 141  135 - 144 mEq/L Final    Comment: Effective:  2/7/2019  New reference range for this analyte has been established.       Potassium 02/19/2019 3.6  3.4 - 4.9 mEq/L Final    Comment: Effective:  2/7/2019  New reference range for this analyte has been established.  Chloride 02/19/2019 102  95 - 107 mEq/L Final    Comment: Effective:  2/7/2019  New reference range for this analyte has been established.  CO2 02/19/2019 28  20 - 31 mEq/L Final    Comment: Effective:  2/7/2019  New reference range for this analyte has been established.  Anion Gap 02/19/2019 11  9 - 15 mEq/L Final    Comment: Effective:  2/7/2019  New reference range for this analyte has been established.  Glucose 02/19/2019 97  70 - 99 mg/dL Final    Comment: Effective:  2/7/2019  New reference range for this analyte has been established.  BUN 02/19/2019 13  6 - 20 mg/dL Final    CREATININE 02/19/2019 0.65  0.50 - 0.90 mg/dL Final    GFR Non- 02/19/2019 >60.0  >60 Final    Comment: >60 mL/min/1.73m2 EGFR, calc. for ages 25 and older using the  MDRD formula (not corrected for weight), is valid for stable  renal function.  GFR  02/19/2019 >60.0  >60 Final    Comment: >60 mL/min/1.73m2 EGFR, calc. for ages 25 and older using the  MDRD formula (not corrected for weight), is valid for stable  renal function.  Calcium 02/19/2019 9.0  8.5 - 9.9 mg/dL Final    Comment: Effective:  2/7/2019  New reference range for this analyte has been established.  Total Protein 02/19/2019 7.4  6.3 - 8.0 g/dL Final    Comment: Effective:  2/7/2019  New reference range for this analyte has been established.  Albumin 02/19/2019 4.3  3.5 - 4.6 g/dL Final    Comment: Effective:  2/7/2019  New reference range for this analyte has been established.  Total Bilirubin 02/19/2019 <0.2  0.2 - 0.7 mg/dL Final    Comment: Effective:  2/7/2019  New reference range for this analyte has been established.       Alkaline Phosphatase 02/19/2019 82  40 - 130 U/L Final    ALT 02/19/2019 12  0 - 33 U/L Final    AST 02/19/2019 20  0 - 35 U/L Final    Globulin 02/19/2019 3.1  2.3 - 3.5 g/dL Final    WBC 02/19/2019 7.5  4.8 - 10.8 K/uL Final    RBC 02/19/2019 4.25  4.20 - 5.40 M/uL Final    Hemoglobin 02/19/2019 12.3  12.0 - 16.0 g/dL Final    Hematocrit 02/19/2019 36.7* 37.0 - 47.0 % Final    MCV 02/19/2019 86.2  82.0 - 100.0 fL Final    MCH 02/19/2019 28.8  27.0 - 31.3 pg Final    MCHC 02/19/2019 33.4  33.0 - 37.0 % Final    RDW 02/19/2019 15.3* 11.5 - 14.5 % Final    Platelets 04/40/1101 199  130 - 400 K/uL Final    Neutrophils % 02/19/2019 68.4  % Final    Lymphocytes % 02/19/2019 23.4  % Final    Monocytes % 02/19/2019 6.1  % Final    Eosinophils % 02/19/2019 1.6  % Final    Basophils % 02/19/2019 0.5  % Final    Neutrophils Absolute 02/19/2019 5.2  1.4 - 6.5 K/uL Final    Lymphocytes Absolute 02/19/2019 1.8  1.0 - 4.8 K/uL Final    Monocytes Absolute 02/19/2019 0.5  0.2 - 0.8 K/uL Final    Eosinophils Absolute 02/19/2019 0.1  0.0 - 0.7 K/uL Final    Basophils Absolute 02/19/2019 0.0  0.0 - 0.2 K/uL Final    Protime 02/19/2019 10.3  9.0 - 11.5 sec Final    Comment: Effective 12/4/18:  Please note reference ranges have  changed for PT testing.  INR 02/19/2019 1.0   Final    Comment: Recommended INR therapeutic ranges for oral anticoagulant  therapy    Prophylaxis/treatment of:                       INR     Venous Thrombosis, Pulmonary Embolism       2.0-3  Prevention of Systemic Embolism from:     Atrial Fibrillation                         2.0-3.0     Myocardial Infarction                       2.0-3.0     Mechanical Prosthetics Heart Valves         2.5-3.5     Recurrent Systemic Embolism                 2.5-3.5  Guidelines for patients with coagulopathy, e.g. liver disease:  Use the Protime resulted in seconds. Mild        12.9-17.0 sec    Moderate    17.1-22.6 sec    Severe      G.T. 22.6 sec      aPTT 02/19/2019 24.3  21.6 - 35.4 sec Final    Comment: Effective 12/4/18:  Please note reference ranges have  changed for PT testing.       Total CK 02/19/2019 78  0 - 170 U/L Final    Troponin 02/19/2019 <0.010  0.000 - 0.010 ng/mL Final    Methodology by Troponin T.    D-Dimer, Quant 02/19/2019 <0.19  0.00 - 0.50 mg/L FEU Final    VTE (DVT or PE) cut-off = 0.50 mg/L FEU         Reviewed patient's current plan of care and vital signs with nursing staff. Labs reviewed: [x] Yes  Last EKG in EMR reviewed: [x] Yes  QTc:     Medications  Current Facility-Administered Medications: lithium capsule 450 mg, 450 mg, Oral, BID WC  oxybutynin (DITROPAN-XL) extended release tablet 5 mg, 5 mg, Oral, BID  SUMAtriptan (IMITREX) tablet 100 mg, 100 mg, Oral, Once PRN  divalproex (DEPAKOTE) DR tablet 500 mg, 500 mg, Oral, 3 times per day  paliperidone (INVEGA) extended release tablet 3 mg, 3 mg, Oral, Daily  naproxen (NAPROSYN) tablet 250 mg, 250 mg, Oral, 4x Daily PRN  carboxymethylcellulose (REFRESH PLUS) 0.5 % ophthalmic solution 1 drop, 1 drop, Both Eyes, BID PRN  acetaminophen (TYLENOL) tablet 650 mg, 650 mg, Oral, Q4H PRN  aluminum & magnesium hydroxide-simethicone (MAALOX) 200-200-20 MG/5ML suspension 30 mL, 30 mL, Oral, Q6H PRN  hydrOXYzine (ATARAX) tablet 50 mg, 50 mg, Oral, TID PRN  nicotine (NICODERM CQ) 14 MG/24HR 1 patch, 1 patch, TransDERmal, Daily  polyethylene glycol (GLYCOLAX) packet 17 g, 17 g, Oral, Daily PRN  traZODone (DESYREL) tablet 50 mg, 50 mg, Oral, Nightly PRN  haloperidol (HALDOL) tablet 5 mg, 5 mg, Oral, Q4H PRN **AND** LORazepam (ATIVAN) tablet 2 mg, 2 mg, Oral, Q4H PRN  haloperidol lactate (HALDOL) injection 5 mg, 5 mg, IntraMUSCular, Q4H PRN **AND** LORazepam (ATIVAN) injection 2 mg, 2 mg, IntraMUSCular, Q4H PRN **AND** diphenhydrAMINE (BENADRYL) injection 50 mg, 50 mg, IntraMUSCular, Q4H PRN  levothyroxine (SYNTHROID) tablet 50 mcg, 50 mcg, Oral, Daily    ASSESSMENT  Bipolar affective disorder, mixed, severe, with psychotic behavior ([facility]         PLAN  Patient symptoms are: Remains Unstable.   Ordered low calorie high protein nutritional supplements  Ordered refresh eyedrops to maintain contacts  Requested all medications to be given 30-45 minutes prior to meals as it relates to gastric sleeve and nursing team will provide this information at shift handoff  Monitor need and frequency of PRN medications. Encourage participation in groups and milieu. Attempt to develop insight. Psycho-education conducted. Supportive Therapy conducted. Probable discharge per attending physician and currently undetermined. Follow-up daily while inpatient. Patient continues to be monitored in the inpatient psychiatric facility at [facility] for safety and stabilization. Patient continues to need, on a daily basis, active treatment furnished directly by or requiring the supervision of inpatient psychiatric personnel. Electronically signed by SILVANO Figueroa CNP on 1/1/2022 at 5:21 PM    **This report has been created using voice recognition software. It may contain minor errors which are inherent in voice recognition technology. **  I independently saw and evaluated the patient. I reviewed the midlevel provider's documentation above. Any additional comments or changes to the midlevel provider's documentation are stated below otherwise agree with assessment. The patient continues to express paranoia. She states she has auditory hallucinations which are telling her to hurt herself. She also had nightmares. She has been taking her medications which include Depakote lithium and Invega. Her Invega dose will be increased to 6 mg daily. PLAN  Medications as noted above  Attempt to develop insight  Psycho-education conducted. Supportive Therapy conducted.   Probable discharge is 3-6 days    Follow-up daily while on inpatient unit    Electronically signed by Quinn Vang MD on 1/1/22 at 10:37 PM EST

## 2022-01-02 PROCEDURE — 99223 1ST HOSP IP/OBS HIGH 75: CPT | Performed by: INTERNAL MEDICINE

## 2022-01-02 PROCEDURE — 1240000000 HC EMOTIONAL WELLNESS R&B

## 2022-01-02 PROCEDURE — 6370000000 HC RX 637 (ALT 250 FOR IP): Performed by: INTERNAL MEDICINE

## 2022-01-02 PROCEDURE — 6370000000 HC RX 637 (ALT 250 FOR IP): Performed by: PSYCHIATRY & NEUROLOGY

## 2022-01-02 PROCEDURE — 99232 SBSQ HOSP IP/OBS MODERATE 35: CPT | Performed by: PSYCHIATRY & NEUROLOGY

## 2022-01-02 RX ORDER — MULTIVITAMIN WITH FOLIC ACID 400 MCG
1 TABLET ORAL DAILY
Status: DISCONTINUED | OUTPATIENT
Start: 2022-01-02 | End: 2022-01-02 | Stop reason: SDUPTHER

## 2022-01-02 RX ORDER — CHOLECALCIFEROL (VITAMIN D3) 1250 MCG
1 CAPSULE ORAL DAILY
Status: DISCONTINUED | OUTPATIENT
Start: 2022-01-02 | End: 2022-01-02 | Stop reason: SDUPTHER

## 2022-01-02 RX ORDER — M-VIT,TX,IRON,MINS/CALC/FOLIC 27MG-0.4MG
1 TABLET ORAL DAILY
Status: DISCONTINUED | OUTPATIENT
Start: 2022-01-02 | End: 2022-01-10 | Stop reason: HOSPADM

## 2022-01-02 RX ORDER — PANTOPRAZOLE SODIUM 40 MG/1
40 TABLET, DELAYED RELEASE ORAL
Status: DISCONTINUED | OUTPATIENT
Start: 2022-01-03 | End: 2022-01-10 | Stop reason: HOSPADM

## 2022-01-02 RX ADMIN — MULTIPLE VITAMINS W/ MINERALS TAB 1 TABLET: TAB at 17:37

## 2022-01-02 RX ADMIN — DIVALPROEX SODIUM 500 MG: 500 TABLET, DELAYED RELEASE ORAL at 06:37

## 2022-01-02 RX ADMIN — LEVOTHYROXINE SODIUM 50 MCG: 0.05 TABLET ORAL at 06:37

## 2022-01-02 RX ADMIN — CARBOXYMETHYLCELLULOSE SODIUM 1 DROP: 5 SOLUTION/ DROPS OPHTHALMIC at 06:37

## 2022-01-02 RX ADMIN — HYDROXYZINE HYDROCHLORIDE 50 MG: 50 TABLET, FILM COATED ORAL at 20:41

## 2022-01-02 RX ADMIN — ACETAMINOPHEN, ASPIRIN, CAFFEINE 1 TABLET: 250; 65; 250 TABLET, FILM COATED ORAL at 15:27

## 2022-01-02 RX ADMIN — Medication 5000 UNITS: at 17:35

## 2022-01-02 RX ADMIN — DIVALPROEX SODIUM 500 MG: 500 TABLET, DELAYED RELEASE ORAL at 20:41

## 2022-01-02 RX ADMIN — LITHIUM CARBONATE 450 MG: 300 CAPSULE, GELATIN COATED ORAL at 17:35

## 2022-01-02 RX ADMIN — DIVALPROEX SODIUM 500 MG: 500 TABLET, DELAYED RELEASE ORAL at 15:27

## 2022-01-02 RX ADMIN — OXYBUTYNIN CHLORIDE 5 MG: 5 TABLET, EXTENDED RELEASE ORAL at 11:00

## 2022-01-02 RX ADMIN — PALIPERIDONE 6 MG: 6 TABLET, EXTENDED RELEASE ORAL at 11:00

## 2022-01-02 RX ADMIN — OXYBUTYNIN CHLORIDE 5 MG: 5 TABLET, EXTENDED RELEASE ORAL at 20:41

## 2022-01-02 ASSESSMENT — PAIN SCALES - GENERAL
PAINLEVEL_OUTOF10: 5
PAINLEVEL_OUTOF10: 0

## 2022-01-02 NOTE — GROUP NOTE
Group Therapy Note    Date: 1/2/2022    Group Start Time: 1030  Group End Time: 1120  Group Topic: Psychotherapy    [facility]    MELI Izquierdo LSW        Group Therapy Note    Attendees: 9/21           Patient was offered group therapy today but declined to participate despite encouragement from staff. 1:1 was offered.     Signature:  MELI Izquierdo LSW

## 2022-01-02 NOTE — BH NOTE
Patient refused to take medication before eating which was her preference previously. NP updated and morning dose held due to time of next scheduled dose. Patient updated, no complaints voiced or abnormal symptoms/behaviors.

## 2022-01-02 NOTE — PLAN OF CARE
Problem: Altered Mood, Psychotic Behavior:  Goal: Able to verbalize decrease in frequency and intensity of hallucinations  Description: Able to verbalize decrease in frequency and intensity of hallucinations  Outcome: Ongoing  Goal: Absence of self-harm  Description: Absence of self-harm  Outcome: Ongoing   Patient remains in behavior control, flat and isolative. Encouragement to participate in unit activities encouraged, she remains in bed flat and focused on pain.

## 2022-01-02 NOTE — PROGRESS NOTES
Daily Progress Note  1/2/2022    Patient Name: Ced Edmond    CHIEF COMPLAINT:  Suicidal ideation due to auditory hallucinations that are command in nature          SUBJECTIVE:      Patient is seen today for a follow up assessment. Patient is compliant with scheduled psychotropic medications including Depakote and Invega however patient did not take her scheduled Lithium. Patient has not required emergency medications in the past 24 hours. Patient is agreeable to interview in her room where she is laying in bed. She does complain of a significant migraine and rates the pain as a 9 (0-10 scale with 0 being none and 10 being the worst). Patient was encouraged to take Excedrin for this. Patient endorses depression and anxiety today rating both as an 8 (0-10 scale with 0 being none and 10 being the worst). She reports that she is still experiencing significant racing thoughts. She reports that she did not sleep well last night and feels very tired today. She reports that her appetite remains poor. She endorses active suicidal ideation without current plan or intent. She denies homicidal ideation. She is able to contract for safety on this unit with this writer. She endorses auditory hallucinations of a female voice but when asked to describe she states, \"the loudness varies but the voices are hard to explain. \" She denies visual hallucinations. She endorses paranoia and states \"I just don't feel very safe. \" She complains of racing thoughts and states she cannot sleep due to these. When asked why patient was not compliant with her lithium she states that she has to take her medications an hour prior to eating due to her gastric bypass surgery. This writer encouraged patient to take her medications when she wakes up prior to breakfast.     Writer encouraged patient to attend groups on the unit. At this time, the patient is not appropriate for a lower level of care.  There is risk of decompensation and patient warrants further hospitalization for safety and stabilization. Appetite:  [] Normal/Adequate/Unchanged  [] Increased  [x] Decreased      Sleep:       [] Normal/Adequate/Unchanged  [] Fair  [x] Poor      Group Attendance on Unit:   [x] Yes  [] Selectively    [] No    Medication Side Effects:  Patient denies any medication side effects at the time of assessment. Mental Status Exam  Level of consciousness: Alert and awake. Appearance: Appropriate attire for setting, resting in bed, with fair  grooming and hygiene. Behavior/Motor: Approachable, no psychomotor abnormalities. Attitude toward examiner: Cooperative, attentive, fair eye contact. Speech: Normal rate, normal volume, normal tone. Mood:  Patient reports \"not too good\". Affect: Flat  Thought processes: Linear and coherent. Thought content: Denies homicidal ideation. Suicidal Ideation: Active suicidal ideations, without current plan or intent, contracts for safety on the unit. Delusions: No evidence of delusions. Endorses paranoia. Perceptual Disturbance: Patient does not appear to be responding to internal stimuli. Endorses auditory hallucinations. Denies visual hallucinations. Cognition: Oriented to self, location, time, and situation. Memory: Intact. Insight & Judgement: Poor. Data   height is 5' 4\" (1.626 m) and weight is 144 lb (65.3 kg). Her temporal temperature is 98.6 °F (37 °C). Her blood pressure is 90/50 (abnormal) and her pulse is 86. Her respiration is 14. Labs:   No visits with results within 2 Day(s) from this visit.    Latest known visit with results is:   Admission on 02/19/2019, Discharged on 02/19/2019   Component Date Value Ref Range Status    Ventricular Rate 02/19/2019 65  BPM Final    Atrial Rate 02/19/2019 65  BPM Final    P-R Interval 02/19/2019 176  ms Final    QRS Duration 02/19/2019 84  ms Final    Q-T Interval 02/19/2019 376  ms Final    QTc Calculation (Bazett) 02/19/2019 391  ms Final  P Axis 02/19/2019 54  degrees Final    R Axis 02/19/2019 44  degrees Final    T Axis 02/19/2019 26  degrees Final    Sodium 02/19/2019 141  135 - 144 mEq/L Final    Comment: Effective:  2/7/2019  New reference range for this analyte has been established.  Potassium 02/19/2019 3.6  3.4 - 4.9 mEq/L Final    Comment: Effective:  2/7/2019  New reference range for this analyte has been established.  Chloride 02/19/2019 102  95 - 107 mEq/L Final    Comment: Effective:  2/7/2019  New reference range for this analyte has been established.  CO2 02/19/2019 28  20 - 31 mEq/L Final    Comment: Effective:  2/7/2019  New reference range for this analyte has been established.  Anion Gap 02/19/2019 11  9 - 15 mEq/L Final    Comment: Effective:  2/7/2019  New reference range for this analyte has been established.  Glucose 02/19/2019 97  70 - 99 mg/dL Final    Comment: Effective:  2/7/2019  New reference range for this analyte has been established.  BUN 02/19/2019 13  6 - 20 mg/dL Final    CREATININE 02/19/2019 0.65  0.50 - 0.90 mg/dL Final    GFR Non- 02/19/2019 >60.0  >60 Final    Comment: >60 mL/min/1.73m2 EGFR, calc. for ages 25 and older using the  MDRD formula (not corrected for weight), is valid for stable  renal function.  GFR  02/19/2019 >60.0  >60 Final    Comment: >60 mL/min/1.73m2 EGFR, calc. for ages 25 and older using the  MDRD formula (not corrected for weight), is valid for stable  renal function.  Calcium 02/19/2019 9.0  8.5 - 9.9 mg/dL Final    Comment: Effective:  2/7/2019  New reference range for this analyte has been established.  Total Protein 02/19/2019 7.4  6.3 - 8.0 g/dL Final    Comment: Effective:  2/7/2019  New reference range for this analyte has been established.  Albumin 02/19/2019 4.3  3.5 - 4.6 g/dL Final    Comment: Effective:  2/7/2019  New reference range for this analyte has been established.       Total Bilirubin 02/19/2019 <0.2  0.2 - 0.7 mg/dL Final    Comment: Effective:  2/7/2019  New reference range for this analyte has been established.  Alkaline Phosphatase 02/19/2019 82  40 - 130 U/L Final    ALT 02/19/2019 12  0 - 33 U/L Final    AST 02/19/2019 20  0 - 35 U/L Final    Globulin 02/19/2019 3.1  2.3 - 3.5 g/dL Final    WBC 02/19/2019 7.5  4.8 - 10.8 K/uL Final    RBC 02/19/2019 4.25  4.20 - 5.40 M/uL Final    Hemoglobin 02/19/2019 12.3  12.0 - 16.0 g/dL Final    Hematocrit 02/19/2019 36.7* 37.0 - 47.0 % Final    MCV 02/19/2019 86.2  82.0 - 100.0 fL Final    MCH 02/19/2019 28.8  27.0 - 31.3 pg Final    MCHC 02/19/2019 33.4  33.0 - 37.0 % Final    RDW 02/19/2019 15.3* 11.5 - 14.5 % Final    Platelets 46/86/2048 199  130 - 400 K/uL Final    Neutrophils % 02/19/2019 68.4  % Final    Lymphocytes % 02/19/2019 23.4  % Final    Monocytes % 02/19/2019 6.1  % Final    Eosinophils % 02/19/2019 1.6  % Final    Basophils % 02/19/2019 0.5  % Final    Neutrophils Absolute 02/19/2019 5.2  1.4 - 6.5 K/uL Final    Lymphocytes Absolute 02/19/2019 1.8  1.0 - 4.8 K/uL Final    Monocytes Absolute 02/19/2019 0.5  0.2 - 0.8 K/uL Final    Eosinophils Absolute 02/19/2019 0.1  0.0 - 0.7 K/uL Final    Basophils Absolute 02/19/2019 0.0  0.0 - 0.2 K/uL Final    Protime 02/19/2019 10.3  9.0 - 11.5 sec Final    Comment: Effective 12/4/18:  Please note reference ranges have  changed for PT testing.       INR 02/19/2019 1.0   Final    Comment: Recommended INR therapeutic ranges for oral anticoagulant  therapy    Prophylaxis/treatment of:                       INR     Venous Thrombosis, Pulmonary Embolism       2.0-3  Prevention of Systemic Embolism from:     Atrial Fibrillation                         2.0-3.0     Myocardial Infarction                       2.0-3.0     Mechanical Prosthetics Heart Valves         2.5-3.5     Recurrent Systemic Embolism                 2.5-3.5  Guidelines for patients with coagulopathy, e.g. liver disease:  Use the Protime resulted in seconds. Mild        12.9-17.0 sec    Moderate    17.1-22.6 sec    Severe      G.T. 22.6 sec      aPTT 02/19/2019 24.3  21.6 - 35.4 sec Final    Comment: Effective 12/4/18:  Please note reference ranges have  changed for PT testing.  Total CK 02/19/2019 78  0 - 170 U/L Final    Troponin 02/19/2019 <0.010  0.000 - 0.010 ng/mL Final    Methodology by Troponin T.    D-Dimer, Quant 02/19/2019 <0.19  0.00 - 0.50 mg/L FEU Final    VTE (DVT or PE) cut-off = 0.50 mg/L FEU         Reviewed patient's current plan of care and vital signs with nursing staff.     Labs reviewed: [x] Yes      Medications  Current Facility-Administered Medications: [START ON 1/4/2022] paliperidone palmitate ER (INVEGA SUSTENNA) IM injection 234 mg, 234 mg, IntraMUSCular, Once  [START ON 1/3/2022] pantoprazole (PROTONIX) tablet 40 mg, 40 mg, Oral, QAM AC  therapeutic multivitamin-minerals 1 tablet, 1 tablet, Oral, Daily  vitamin D3 (CHOLECALCIFEROL) tablet 5,000 Units, 5,000 Units, Oral, Daily  oxybutynin (DITROPAN-XL) extended release tablet 5 mg, 5 mg, Oral, BID  naproxen (NAPROSYN) tablet 250 mg, 250 mg, Oral, 4x Daily PRN  carboxymethylcellulose (REFRESH PLUS) 0.5 % ophthalmic solution 1 drop, 1 drop, Both Eyes, BID PRN  divalproex (DEPAKOTE) DR tablet 500 mg, 500 mg, Oral, 3 times per day  levothyroxine (SYNTHROID) tablet 50 mcg, 50 mcg, Oral, Daily  lithium capsule 450 mg, 450 mg, Oral, BID WC  aspirin-acetaminophen-caffeine (EXCEDRIN MIGRAINE) per tablet 1 tablet, 1 tablet, Oral, Q6H PRN  paliperidone (INVEGA) extended release tablet 6 mg, 6 mg, Oral, Daily  acetaminophen (TYLENOL) tablet 650 mg, 650 mg, Oral, Q4H PRN  aluminum & magnesium hydroxide-simethicone (MAALOX) 200-200-20 MG/5ML suspension 30 mL, 30 mL, Oral, Q6H PRN  hydrOXYzine (ATARAX) tablet 50 mg, 50 mg, Oral, TID PRN  nicotine (NICODERM CQ) 14 MG/24HR 1 patch, 1 patch, TransDERmal, Daily  polyethylene states she wants to be in the hospital until he gets better. Is not clear why she prefers the hospital.      PLAN  Medications as noted above  Attempt to develop insight  Psycho-education conducted. Supportive Therapy conducted.   Probable discharge is 3-6 days  Follow-up daily while on inpatient unit    Electronically signed by Adalberto Rice MD on 1/2/22 at 10:24 PM EST

## 2022-01-02 NOTE — CONSULTS
[facility]    CONSULTATION / HISTORY AND PHYSICAL EXAMINATION            Date:   1/2/2022  Patient name:  Echo Elizabeth  Date of admission:  12/31/2021  4:01 AM  MRN:   [id number]  Account:  [de-identified]  YOB: 1985  PCP:    Denise Jeffery MD  Room:   1148/9742-27  Code Status:    Full Code    Physician Requesting Consult: Jose Francisco Hooker MD    Reason for Consult:  medical management    Chief Complaint:     No chief complaint on file. Multiple medical complaints    History Obtained From:     Patient medical record nursing staff    History of Present Illness:     16-year-old lady with history of obesity status post gastric bypass surgery 6 years ago complicated by an ulcer was taking PPI daily use of vaping denies any dyspnea any nausea vomiting any chest pain      Past Medical History:     Past Medical History:   Diagnosis Date    Anxiety     Bipolar [facility]     Bipolar affective disorder, mixed, severe, with psychotic behavior ([facility] 12/31/2021    Gastric ulcer     Opiate abuse, continuous ([facility]     Polycystic ovaries         Past Surgical History:     Past Surgical History:   Procedure Laterality Date    CHOLECYSTECTOMY      COSMETIC SURGERY      GASTRIC BYPASS SURGERY          Medications Prior to Admission:     Prior to Admission medications    Medication Sig Start Date End Date Taking? Authorizing Provider   acyclovir (ZOVIRAX) 400 MG tablet Take 1 tablet by mouth 2 times daily 12/22/21 1/21/22  Denise Jeffery MD   fluocinolone acetonide (SYNALAR) 0.01 % external solution Apply topically 2 times daily x 2 weeks .  11/18/21   Denise Jeffery MD   divalproex (DEPAKOTE) 250 MG DR tablet  11/3/21   Historical Provider, MD   Na Sulfate-K Sulfate-Mg Sulf 17.5-3.13-1.6 GM/177ML SOLN As directed 11/17/21   Brandon Gonzales MD   omeprazole (PRILOSEC) 40 MG delayed release capsule Take 1 capsule by mouth every morning (before breakfast) 11/17/21   Rinku Higginbotham MD   SUMAtriptan (IMITREX) 100 MG tablet Take 1 tablet by mouth once as needed for Migraine 11/15/21 11/15/21  Cecy Lopez MD   SUMAtriptan (IMITREX) 100 MG tablet Take 1 tablet by mouth once as needed for Migraine 10/27/21 10/27/21  Cecy Lopez MD   valACYclovir (VALTREX) 1 g tablet TAKE 1 TABLET BY MOUTH EVERY DAY (INSURANCE LIMITS TO 21 TABS PER 30 DAYS) 9/21/21   Cecy Lopez MD   EPINEPHrine (EPIPEN 2-SANTHOSH) 0.3 MG/0.3ML SOAJ injection Use as directed for allergic reaction 9/9/21   Cecy Lopez MD   omeprazole (PRILOSEC) 20 MG delayed release capsule TAKE 2 CAPSULES BY MOUTH EVERY DAY 8/21/21   Cecy Lopez MD   lithium 300 MG capsule Take 450 mg by mouth 2 times daily (with meals)    Historical Provider, MD   oxybutynin (DITROPAN-XL) 5 MG extended release tablet Take 5 mg by mouth 2 times daily    Historical Provider, MD   Cholecalciferol (VITAMIN D3) 1.25 MG (84231 UT) CAPS Take by mouth    Historical Provider, MD   levothyroxine (SYNTHROID) 50 MCG tablet Take 50 mcg by mouth Daily    Historical Provider, MD   Multiple Vitamin (MULTI-VITAMIN PO) Take by mouth    Historical Provider, MD        Allergies:     Bee venom and Other    Social History:     Tobacco:    reports that she has never smoked. She has never used smokeless tobacco.  Alcohol:      reports no history of alcohol use. Drug Use:  reports current drug use. Drug: Opiates . Family History:     History reviewed. No pertinent family history. Review of Systems:     Positive and Negative as described in HPI. CONSTITUTIONAL:  negative for fevers, chills, sweats, fatigue, weight loss  HEENT:  negative for vision, hearing changes, runny nose, throat pain  RESPIRATORY:  negative for shortness of breath, cough, congestion, wheezing. CARDIOVASCULAR:  negative for chest pain, palpitations.   GASTROINTESTINAL:  negative for nausea, vomiting, diarrhea, constipation, change in bowel habits, abdominal pain   GENITOURINARY:  negative for difficulty of urination, burning with urination, frequency   INTEGUMENT:  negative for rash, skin lesions, easy bruising   HEMATOLOGIC/LYMPHATIC:  negative for swelling/edema   ALLERGIC/IMMUNOLOGIC:  negative for urticaria , itching  ENDOCRINE:  negative increase in drinking, increase in urination, hot or cold intolerance  MUSCULOSKELETAL:  negative joint pains, muscle aches, swelling of joints  NEUROLOGICAL:  negative for headaches, dizziness, lightheadedness, numbness, pain, tingling extremities        Physical Exam:     BP (!) 90/50   Pulse 86   Temp 98.6 °F (37 °C) (Temporal)   Resp 14   Ht 5' 4\" (1.626 m)   Wt 144 lb (65.3 kg)   BMI 24.72 kg/m²   Temp (24hrs), Av.5 °F (36.9 °C), Min:98.4 °F (36.9 °C), Max:98.6 °F (37 °C)    No results for input(s): POCGLU in the last 72 hours. No intake or output data in the 24 hours ending 22 1440    General Appearance:  alert, well appearing, and in no acute distress  Mental status: oriented to person, place, and time with normal affect  Head:  normocephalic, atraumatic. Eye: no icterus, redness, pupils equal and reactive, extraocular eye movements intact, conjunctiva clear  Ear: normal external ear, no discharge, hearing intact  Nose:  no drainage noted  Mouth: mucous membranes moist  Neck: supple, no carotid bruits, thyroid not palpable  Lungs: Bilateral equal air entry, clear to ausculation, no wheezing, rales or rhonchi, normal effort  Cardiovascular: normal rate, regular rhythm, no murmur, gallop, rub.   Abdomen: Soft, nontender, nondistended, normal bowel sounds, no hepatomegaly or splenomegaly  Midline laparotomy scar noted  Neurologic: There are no new focal motor or sensory deficits, normal muscle tone and bulk, no abnormal sensation, normal speech, cranial nerves II through XII grossly intact  Skin: No gross lesions, rashes, bruising or bleeding on exposed skin area  Extremities:  peripheral pulses palpable, no pedal edema or calf pain with palpation      Investigations:      Laboratory Testing:  No results found for this or any previous visit (from the past 24 hour(s)). Consultations:   IP CONSULT TO INTERNAL MEDICINE  Assessment :      Primary Problem  Bipolar affective disorder, mixed, severe, with psychotic behavior [facility])    Active Hospital Problems    Diagnosis Date Noted    Depression with suicidal ideation [F32. A, R45.851] 12/31/2021    Bipolar affective disorder, mixed, severe, with psychotic behavior [facility] [F31.64] 12/31/2021       Plan:     58-year-old  lady with history of morbid obesity status post gastric bypass surgery last close to 200 pounds  Restart multivitamin  History of peptic ulcer disease possible anastomotic ulcer restart pantoprazole 40 mg  Avoid any NSAIDs  Patient patient does daily vaping counseled against the use of vaping  Recheck Mi Berrios MD  1/2/2022  2:40 PM    Copy sent to Dr. Anh Cronin MD    Please note that this chart was generated using voice recognition Dragon dictation software. Although every effort was made to ensure the accuracy of this automated transcription, some errors in transcription may have occurred.

## 2022-01-02 NOTE — PLAN OF CARE
Problem: Altered Mood, Psychotic Behavior:  Goal: Able to verbalize decrease in frequency and intensity of hallucinations  Description: Able to verbalize decrease in frequency and intensity of hallucinations  1/2/2022 0806 by Peyton Castellano LPN  Outcome: Ongoing     Problem: Altered Mood, Psychotic Behavior:  Goal: Absence of self-harm  Description: Absence of self-harm  1/2/2022 0806 by Peyton Castellano LPN  Outcome: Ongoing  Note: Patient is cooperative and medication compliant but reports feeling depressed with general anxiety and fleeting suicidal ideations with no plan. Patient contracts for safety. Patient is isolative and has little interest or motivation, she is encouraged to attend programs for further support. Problem: Tobacco Use:  Goal: Inpatient tobacco use cessation counseling participation  Description: Inpatient tobacco use cessation counseling participation  Outcome: Ongoing  Note: Patient declines any tobacco cessation literature.

## 2022-01-02 NOTE — GROUP NOTE
Group Therapy Note    Date: 1/2/2022    Group Start Time: 1400  Group End Time: 4241  Group Topic: Cognitive Skills    [facility]    Mary Stephens, CTRS        Group Therapy Note    Attendees: 14/20         Pt did not participate in Cognitive Skills Group at 14:00 when encouraged by RT due to resting in room. Pt was offered talk time as an alternative to group but declined.        Discipline Responsible: Psychoeducational Specialist      Signature:  Chica Grey

## 2022-01-03 LAB
ALBUMIN SERPL-MCNC: 3.3 G/DL (ref 3.5–5.2)
ALBUMIN/GLOBULIN RATIO: ABNORMAL (ref 1–2.5)
ALP BLD-CCNC: 56 U/L (ref 35–104)
ALT SERPL-CCNC: 9 U/L (ref 5–33)
ANION GAP SERPL CALCULATED.3IONS-SCNC: 6 MMOL/L (ref 9–17)
AST SERPL-CCNC: 11 U/L
BILIRUB SERPL-MCNC: <0.15 MG/DL (ref 0.3–1.2)
BILIRUBIN DIRECT: <0.08 MG/DL
BILIRUBIN, INDIRECT: ABNORMAL MG/DL (ref 0–1)
BUN BLDV-MCNC: 11 MG/DL (ref 6–20)
BUN/CREAT BLD: ABNORMAL (ref 9–20)
CALCIUM SERPL-MCNC: 8.8 MG/DL (ref 8.6–10.4)
CHLORIDE BLD-SCNC: 105 MMOL/L (ref 98–107)
CO2: 28 MMOL/L (ref 20–31)
CREAT SERPL-MCNC: 0.67 MG/DL (ref 0.5–0.9)
GFR AFRICAN AMERICAN: >60 ML/MIN
GFR NON-AFRICAN AMERICAN: >60 ML/MIN
GFR SERPL CREATININE-BSD FRML MDRD: ABNORMAL ML/MIN/{1.73_M2}
GFR SERPL CREATININE-BSD FRML MDRD: ABNORMAL ML/MIN/{1.73_M2}
GLOBULIN: ABNORMAL G/DL (ref 1.5–3.8)
GLUCOSE BLD-MCNC: 85 MG/DL (ref 70–99)
POTASSIUM SERPL-SCNC: 4.6 MMOL/L (ref 3.7–5.3)
SODIUM BLD-SCNC: 139 MMOL/L (ref 135–144)
TOTAL PROTEIN: 5.5 G/DL (ref 6.4–8.3)
TSH SERPL DL<=0.05 MIU/L-ACNC: 1.58 MIU/L (ref 0.3–5)

## 2022-01-03 PROCEDURE — 36415 COLL VENOUS BLD VENIPUNCTURE: CPT

## 2022-01-03 PROCEDURE — 6370000000 HC RX 637 (ALT 250 FOR IP): Performed by: PSYCHIATRY & NEUROLOGY

## 2022-01-03 PROCEDURE — 1240000000 HC EMOTIONAL WELLNESS R&B

## 2022-01-03 PROCEDURE — 99231 SBSQ HOSP IP/OBS SF/LOW 25: CPT | Performed by: INTERNAL MEDICINE

## 2022-01-03 PROCEDURE — 6370000000 HC RX 637 (ALT 250 FOR IP): Performed by: INTERNAL MEDICINE

## 2022-01-03 PROCEDURE — APPSS30 APP SPLIT SHARED TIME 16-30 MINUTES

## 2022-01-03 PROCEDURE — 80076 HEPATIC FUNCTION PANEL: CPT

## 2022-01-03 PROCEDURE — 99232 SBSQ HOSP IP/OBS MODERATE 35: CPT | Performed by: PSYCHIATRY & NEUROLOGY

## 2022-01-03 PROCEDURE — 84443 ASSAY THYROID STIM HORMONE: CPT

## 2022-01-03 PROCEDURE — 80048 BASIC METABOLIC PNL TOTAL CA: CPT

## 2022-01-03 RX ORDER — LITHIUM CARBONATE 150 MG/1
450 CAPSULE ORAL 2 TIMES DAILY WITH MEALS
Status: DISCONTINUED | OUTPATIENT
Start: 2022-01-04 | End: 2022-01-10 | Stop reason: HOSPADM

## 2022-01-03 RX ADMIN — OXYBUTYNIN CHLORIDE 5 MG: 5 TABLET, EXTENDED RELEASE ORAL at 20:51

## 2022-01-03 RX ADMIN — MULTIPLE VITAMINS W/ MINERALS TAB 1 TABLET: TAB at 10:36

## 2022-01-03 RX ADMIN — NAPROXEN 250 MG: 250 TABLET ORAL at 10:38

## 2022-01-03 RX ADMIN — DIVALPROEX SODIUM 500 MG: 500 TABLET, DELAYED RELEASE ORAL at 20:51

## 2022-01-03 RX ADMIN — HYDROXYZINE HYDROCHLORIDE 50 MG: 50 TABLET, FILM COATED ORAL at 20:51

## 2022-01-03 RX ADMIN — PALIPERIDONE 6 MG: 6 TABLET, EXTENDED RELEASE ORAL at 10:38

## 2022-01-03 RX ADMIN — ACETAMINOPHEN, ASPIRIN, CAFFEINE 1 TABLET: 250; 65; 250 TABLET, FILM COATED ORAL at 10:38

## 2022-01-03 RX ADMIN — LEVOTHYROXINE SODIUM 50 MCG: 0.05 TABLET ORAL at 06:38

## 2022-01-03 RX ADMIN — DIVALPROEX SODIUM 500 MG: 500 TABLET, DELAYED RELEASE ORAL at 06:38

## 2022-01-03 RX ADMIN — LITHIUM CARBONATE 450 MG: 300 CAPSULE, GELATIN COATED ORAL at 10:36

## 2022-01-03 RX ADMIN — Medication 5000 UNITS: at 10:38

## 2022-01-03 RX ADMIN — TRAZODONE HYDROCHLORIDE 50 MG: 50 TABLET ORAL at 20:51

## 2022-01-03 RX ADMIN — OXYBUTYNIN CHLORIDE 5 MG: 5 TABLET, EXTENDED RELEASE ORAL at 10:38

## 2022-01-03 RX ADMIN — PANTOPRAZOLE SODIUM 40 MG: 40 TABLET, DELAYED RELEASE ORAL at 06:38

## 2022-01-03 RX ADMIN — LITHIUM CARBONATE 450 MG: 300 CAPSULE, GELATIN COATED ORAL at 16:53

## 2022-01-03 RX ADMIN — CARBOXYMETHYLCELLULOSE SODIUM 1 DROP: 5 SOLUTION/ DROPS OPHTHALMIC at 20:52

## 2022-01-03 RX ADMIN — ACETAMINOPHEN, ASPIRIN, CAFFEINE 1 TABLET: 250; 65; 250 TABLET, FILM COATED ORAL at 20:51

## 2022-01-03 RX ADMIN — DIVALPROEX SODIUM 500 MG: 500 TABLET, DELAYED RELEASE ORAL at 14:29

## 2022-01-03 ASSESSMENT — PAIN SCALES - GENERAL
PAINLEVEL_OUTOF10: 0
PAINLEVEL_OUTOF10: 7

## 2022-01-03 NOTE — GROUP NOTE
Group Therapy Note    Date: 1/3/2022    Group Start Time: 1100  Group End Time: 6970  Group Topic: Cognitive Skills    SUPRIYA Chiang, CTRS    Pt did not attend 1100 cognitive skills group d/t resting in room despite staff invitation to attend. 1:1 talk time offered as alternative to group session, pt declined.           Signature:  Juancarlos Chiu

## 2022-01-03 NOTE — PROGRESS NOTES
Daily Progress Note  1/3/2022    Patient Name: Danielle Leon    CHIEF COMPLAINT:  Suicidal ideation due to auditory hallucinations that are command in nature          SUBJECTIVE:      Patient is seen today for a follow up assessment. Patient is compliant with scheduled medications of Depakote, lithium, and Invega. Patient has not required emergency medications in the past 24 hours. Patient is agreeable to interview in her room today where she is lying in bed with a blanket over her face. She does take this off for assessment. She continues to report significant anxiety and depression. She states that she is suffering from racing thoughts today and that they are very bad. She reports that she slept poorly last night and had trouble falling asleep and staying asleep throughout the night. She reports that her appetite is adequate and is using ensure protein shakes. Patient endorses fleeting suicidal ideation without current plan or intent. She denies homicidal ideation. She is able to contract for safety in this unit with this writer. She continues to endorse auditory hallucinations that are very severe and she states \"they are talking about bad things. \"  She reports that they are allowed and causing her much distress. She denies visual hallucinations. She does endorse paranoia but does not elaborate about which is paranoid. She denies medication side effects or medical concerns at this time. She reports that her headache is improved with the use of Excedrin. Patient is isolated to her room today and does not come out for groups today. Writer encouraged patient to attend groups on the unit. At this time, the patient is not appropriate for a lower level of care. There is risk of decompensation and patient warrants further hospitalization for safety and stabilization.     Appetite:  [x] Normal/Adequate/Unchanged  [] Increased  [] Decreased      Sleep:       [] Normal/Adequate/Unchanged  [] Fair  [x] Poor      Group Attendance on Unit:   [x] Yes  [] Selectively    [] No    Medication Side Effects:  Patient denies any medication side effects at the time of assessment. Mental Status Exam  Level of consciousness: Alert and awake. Appearance: Appropriate attire for setting, resting in bed, with fair  grooming and hygiene. Behavior/Motor: Approachable, no psychomotor abnormalities. Attitude toward examiner: Cooperative, attentive, fair eye contact. Speech: Normal rate, quiet volume, depressed tone. Mood:  Patient reports \"not great\". Affect: Flat, depressed  Thought processes: Linear and coherent. Thought content: Denies homicidal ideation. Suicidal Ideation: Fleeting suicidal ideations, without current plan or intent, contracts for safety on the unit. Delusions: No evidence of delusions. Endorses paranoia. Perceptual Disturbance: Patient does not appear to be responding to internal stimuli. Endorses auditory hallucinations. Denies visual hallucinations. Cognition: Oriented to self, location, time, and situation. Memory: Intact. Insight & Judgement: Poor. Data   height is 5' 4\" (1.626 m) and weight is 144 lb (65.3 kg). Her oral temperature is 98.6 °F (37 °C). Her blood pressure is 103/58 (abnormal) and her pulse is 90. Her respiration is 14.    Labs:   Admission on 12/31/2021   Component Date Value Ref Range Status    Glucose 01/03/2022 85  70 - 99 mg/dL Final    BUN 01/03/2022 11  6 - 20 mg/dL Final    CREATININE 01/03/2022 0.67  0.50 - 0.90 mg/dL Final    Bun/Cre Ratio 01/03/2022 NOT REPORTED  9 - 20 Final    Calcium 01/03/2022 8.8  8.6 - 10.4 mg/dL Final    Sodium 01/03/2022 139  135 - 144 mmol/L Final    Potassium 01/03/2022 4.6  3.7 - 5.3 mmol/L Final    Chloride 01/03/2022 105  98 - 107 mmol/L Final    CO2 01/03/2022 28  20 - 31 mmol/L Final    Anion Gap 01/03/2022 6* 9 - 17 mmol/L Final    GFR Non- 01/03/2022 >60  >60 mL/min Final    GFR  01/03/2022 >60  >60 mL/min Final    GFR Comment 01/03/2022        Final    Comment: Average GFR for 30-36 years old:   107 mL/min/1.73sq m  Chronic Kidney Disease:   <60 mL/min/1.73sq m  Kidney failure:   <15 mL/min/1.73sq m              eGFR calculated using average adult body mass. Additional eGFR calculator available at:        "[employer]br            GFR Staging 01/03/2022 NOT REPORTED   Final    TSH 01/03/2022 1.58  0.30 - 5.00 mIU/L Final    Albumin 01/03/2022 3.3* 3.5 - 5.2 g/dL Final    Alkaline Phosphatase 01/03/2022 56  35 - 104 U/L Final    ALT 01/03/2022 9  5 - 33 U/L Final    AST 01/03/2022 11  <32 U/L Final    Total Bilirubin 01/03/2022 <0.15* 0.3 - 1.2 mg/dL Final    Bilirubin, Direct 01/03/2022 <0.08  <0.31 mg/dL Final    Bilirubin, Indirect 01/03/2022 Can not be calculated  0.00 - 1.00 mg/dL Final    Total Protein 01/03/2022 5.5* 6.4 - 8.3 g/dL Final    Globulin 01/03/2022 NOT REPORTED  1.5 - 3.8 g/dL Final    Albumin/Globulin Ratio 01/03/2022 NOT REPORTED  1.0 - 2.5 Final         Reviewed patient's current plan of care and vital signs with nursing staff.     Labs reviewed: [x] Yes      Medications  Current Facility-Administered Medications: [START ON 1/4/2022] paliperidone palmitate ER (INVEGA SUSTENNA) IM injection 234 mg, 234 mg, IntraMUSCular, Once  pantoprazole (PROTONIX) tablet 40 mg, 40 mg, Oral, QAM AC  therapeutic multivitamin-minerals 1 tablet, 1 tablet, Oral, Daily  vitamin D3 (CHOLECALCIFEROL) tablet 5,000 Units, 5,000 Units, Oral, Daily  oxybutynin (DITROPAN-XL) extended release tablet 5 mg, 5 mg, Oral, BID  naproxen (NAPROSYN) tablet 250 mg, 250 mg, Oral, 4x Daily PRN  carboxymethylcellulose (REFRESH PLUS) 0.5 % ophthalmic solution 1 drop, 1 drop, Both Eyes, BID PRN  divalproex (DEPAKOTE) DR tablet 500 mg, 500 mg, Oral, 3 times per day  levothyroxine (SYNTHROID) tablet 50 mcg, 50 mcg, Oral, Daily  lithium capsule 450 mg, 450 mg, Oral, BID WC  aspirin-acetaminophen-caffeine (EXCEDRIN MIGRAINE) per tablet 1 tablet, 1 tablet, Oral, Q6H PRN  paliperidone (INVEGA) extended release tablet 6 mg, 6 mg, Oral, Daily  acetaminophen (TYLENOL) tablet 650 mg, 650 mg, Oral, Q4H PRN  aluminum & magnesium hydroxide-simethicone (MAALOX) 200-200-20 MG/5ML suspension 30 mL, 30 mL, Oral, Q6H PRN  hydrOXYzine (ATARAX) tablet 50 mg, 50 mg, Oral, TID PRN  nicotine (NICODERM CQ) 14 MG/24HR 1 patch, 1 patch, TransDERmal, Daily  polyethylene glycol (GLYCOLAX) packet 17 g, 17 g, Oral, Daily PRN  traZODone (DESYREL) tablet 50 mg, 50 mg, Oral, Nightly PRN  haloperidol (HALDOL) tablet 5 mg, 5 mg, Oral, Q4H PRN **AND** LORazepam (ATIVAN) tablet 2 mg, 2 mg, Oral, Q4H PRN  haloperidol lactate (HALDOL) injection 5 mg, 5 mg, IntraMUSCular, Q4H PRN **AND** LORazepam (ATIVAN) injection 2 mg, 2 mg, IntraMUSCular, Q4H PRN **AND** diphenhydrAMINE (BENADRYL) injection 50 mg, 50 mg, IntraMUSCular, Q4H PRN    ASSESSMENT  Bipolar affective disorder, mixed, severe, with psychotic behavior ([facility] [address]         PLAN  Patient symptoms are: Remains Unstable. Continue current medication regimen. Lowilliam Liming Sustenna 234 mg injection scheduled for 1/4/2022  Monitor need and frequency of PRN medications. Encourage participation in groups and milieu. Attempt to develop insight. Psycho-education conducted. Supportive Therapy conducted. Probable discharge is to be determined by MD.   Follow-up daily while inpatient. Patient continues to be monitored in the inpatient psychiatric facility at [facility] for safety and stabilization. Patient continues to need, on a daily basis, active treatment furnished directly by or requiring the supervision of inpatient psychiatric personnel. Electronically signed by SILVANO Christian CNP on 1/3/2022 at 3:35 PM    **This report has been created using voice recognition software.  It may contain minor errors which are inherent in voice recognition technology. **    I independently saw and evaluated the patient. I reviewed the midlevel provider's documentation above. Any additional comments or changes to the midlevel provider's documentation are stated below otherwise agree with assessment. The patient states she is hearing voices which are telling her that they are going to do bad things to her. She has been paranoid for about 6 months. She has thought that her boyfriend was going to hurt her though she is able to tell that this is not based in reality. The patient is distressed by this. PLAN  Medications as noted above  Attempt to develop insight  Psycho-education conducted. Supportive Therapy conducted.   Probable discharge is 3-9 days  Follow-up daily while on inpatient unit    Electronically signed by Adalberto Rice MD on 1/3/22 at 8:24 PM EST

## 2022-01-03 NOTE — PROGRESS NOTES
[place] [id number] Internal Medicine    CONSULTATION / HISTORY AND PHYSICAL EXAMINATION            Date:   1/3/2022  Patient name:  Zaid Ward  Date of admission:  12/31/2021  4:01 AM  MRN:   [id number]  Account:  [de-identified]  YOB: 1985  PCP:    Megan Whitaker MD  Room:   0369/6962-24  Code Status:    Full Code    Physician Requesting Consult: Lula Vela MD    Reason for Consult:  medical management    Chief Complaint:     No chief complaint on file. Multiple medical complaints    History Obtained From:     Patient medical record nursing staff    History of Present Illness:     27-year-old lady with history of obesity status post gastric bypass surgery 6 years ago complicated by an ulcer was taking PPI daily use of vaping denies any dyspnea any nausea vomiting any chest pain      Past Medical History:     Past Medical History:   Diagnosis Date    Anxiety     Bipolar 1 [facility] [address]     Bipolar affective disorder, mixed, severe, with psychotic behavior ([facility] [address] 12/31/2021    Gastric ulcer     Opiate abuse, continuous ([facility] [address]     Polycystic ovaries         Past Surgical History:     Past Surgical History:   Procedure Laterality Date    CHOLECYSTECTOMY      COSMETIC SURGERY      GASTRIC BYPASS SURGERY          Medications Prior to Admission:     Prior to Admission medications    Medication Sig Start Date End Date Taking? Authorizing Provider   acyclovir (ZOVIRAX) 400 MG tablet Take 1 tablet by mouth 2 times daily 12/22/21 1/21/22  Megan Whitaker MD   fluocinolone acetonide (SYNALAR) 0.01 % external solution Apply topically 2 times daily x 2 weeks .  11/18/21   Megan Whitaker MD   divalproex (DEPAKOTE) 250 MG DR tablet  11/3/21   Historical Provider, MD   Na Sulfate-K Sulfate-Mg Sulf 17.5-3.13-1.6 GM/177ML SOLN As directed 11/17/21   Louis Galicia MD   omeprazole (PRILOSEC) 40 MG delayed release capsule Take 1 capsule by mouth every morning (before breakfast) 11/17/21   Hillary Gerard MD   SUMAtriptan (IMITREX) 100 MG tablet Take 1 tablet by mouth once as needed for Migraine 11/15/21 11/15/21  Bria Cortes MD   SUMAtriptan (IMITREX) 100 MG tablet Take 1 tablet by mouth once as needed for Migraine 10/27/21 10/27/21  Bria Cortes MD   valACYclovir (VALTREX) 1 g tablet TAKE 1 TABLET BY MOUTH EVERY DAY (INSURANCE LIMITS TO 21 TABS PER 30 DAYS) 9/21/21   Bria Cortes MD   EPINEPHrine (EPIPEN 2-SANTHOSH) 0.3 MG/0.3ML SOAJ injection Use as directed for allergic reaction 9/9/21   Bria Cortes MD   omeprazole (PRILOSEC) 20 MG delayed release capsule TAKE 2 CAPSULES BY MOUTH EVERY DAY 8/21/21   Bria Cortes MD   lithium 300 MG capsule Take 450 mg by mouth 2 times daily (with meals)    Historical Provider, MD   oxybutynin (DITROPAN-XL) 5 MG extended release tablet Take 5 mg by mouth 2 times daily    Historical Provider, MD   Cholecalciferol (VITAMIN D3) 1.25 MG (81443 UT) CAPS Take by mouth    Historical Provider, MD   levothyroxine (SYNTHROID) 50 MCG tablet Take 50 mcg by mouth Daily    Historical Provider, MD   Multiple Vitamin (MULTI-VITAMIN PO) Take by mouth    Historical Provider, MD        Allergies:     Bee venom and Other    Social History:     Tobacco:    reports that she has never smoked. She has never used smokeless tobacco.  Alcohol:      reports no history of alcohol use. Drug Use:  reports current drug use. Drug: Opiates . Family History:     History reviewed. No pertinent family history. Review of Systems:     Positive and Negative as described in HPI. CONSTITUTIONAL:  negative for fevers, chills, sweats, fatigue, weight loss  HEENT:  negative for vision, hearing changes, runny nose, throat pain  RESPIRATORY:  negative for shortness of breath, cough, congestion, wheezing. CARDIOVASCULAR:  negative for chest pain, palpitations.   GASTROINTESTINAL:  negative for nausea, vomiting, diarrhea, constipation, change in bowel habits, abdominal pain   GENITOURINARY:  negative for difficulty of urination, burning with urination, frequency   INTEGUMENT:  negative for rash, skin lesions, easy bruising   HEMATOLOGIC/LYMPHATIC:  negative for swelling/edema   ALLERGIC/IMMUNOLOGIC:  negative for urticaria , itching  ENDOCRINE:  negative increase in drinking, increase in urination, hot or cold intolerance  MUSCULOSKELETAL:  negative joint pains, muscle aches, swelling of joints  NEUROLOGICAL:  negative for headaches, dizziness, lightheadedness, numbness, pain, tingling extremities        Physical Exam:     BP (!) 103/58   Pulse 90   Temp 98.6 °F (37 °C) (Oral)   Resp 14   Ht 5' 4\" (1.626 m)   Wt 144 lb (65.3 kg)   BMI 24.72 kg/m²   Temp (24hrs), Av.4 °F (36.9 °C), Min:98.1 °F (36.7 °C), Max:98.6 °F (37 °C)    No results for input(s): POCGLU in the last 72 hours. No intake or output data in the 24 hours ending 22 1312    General Appearance:  alert, well appearing, and in no acute distress  Mental status: oriented to person, place, and time with normal affect  Head:  normocephalic, atraumatic. Eye: no icterus, redness, pupils equal and reactive, extraocular eye movements intact, conjunctiva clear  Ear: normal external ear, no discharge, hearing intact  Nose:  no drainage noted  Mouth: mucous membranes moist  Neck: supple, no carotid bruits, thyroid not palpable  Lungs: Bilateral equal air entry, clear to ausculation, no wheezing, rales or rhonchi, normal effort  Cardiovascular: normal rate, regular rhythm, no murmur, gallop, rub.   Abdomen: Soft, nontender, nondistended, normal bowel sounds, no hepatomegaly or splenomegaly  Midline laparotomy scar noted  Neurologic: There are no new focal motor or sensory deficits, normal muscle tone and bulk, no abnormal sensation, normal speech, cranial nerves II through XII grossly intact  Skin: No gross lesions, rashes, bruising or bleeding on exposed skin area  Extremities:  peripheral pulses palpable, no pedal edema or calf pain with palpation      Investigations:      Laboratory Testing:  Recent Results (from the past 24 hour(s))   Basic Metabolic Panel    Collection Time: 01/03/22  6:32 AM   Result Value Ref Range    Glucose 85 70 - 99 mg/dL    BUN 11 6 - 20 mg/dL    CREATININE 0.67 0.50 - 0.90 mg/dL    Bun/Cre Ratio NOT REPORTED 9 - 20    Calcium 8.8 8.6 - 10.4 mg/dL    Sodium 139 135 - 144 mmol/L    Potassium 4.6 3.7 - 5.3 mmol/L    Chloride 105 98 - 107 mmol/L    CO2 28 20 - 31 mmol/L    Anion Gap 6 (L) 9 - 17 mmol/L    GFR Non-African American >60 >60 mL/min    GFR African American >60 >60 mL/min    GFR Comment          GFR Staging NOT REPORTED    TSH with Reflex    Collection Time: 01/03/22  6:32 AM   Result Value Ref Range    TSH 1.58 0.30 - 5.00 mIU/L   Liver Profile    Collection Time: 01/03/22  6:32 AM   Result Value Ref Range    Albumin 3.3 (L) 3.5 - 5.2 g/dL    Alkaline Phosphatase 56 35 - 104 U/L    ALT 9 5 - 33 U/L    AST 11 <32 U/L    Total Bilirubin <0.15 (L) 0.3 - 1.2 mg/dL    Bilirubin, Direct <0.08 <0.31 mg/dL    Bilirubin, Indirect Can not be calculated 0.00 - 1.00 mg/dL    Total Protein 5.5 (L) 6.4 - 8.3 g/dL    Globulin NOT REPORTED 1.5 - 3.8 g/dL    Albumin/Globulin Ratio NOT REPORTED 1.0 - 2.5           Consultations:   IP CONSULT TO INTERNAL MEDICINE  Assessment :      Primary Problem  Bipolar affective disorder, mixed, severe, with psychotic behavior [facility])    Active Hospital Problems    Diagnosis Date Noted    Depression with suicidal ideation [F32. A, R45.851] 12/31/2021    Bipolar affective disorder, mixed, severe, with psychotic behavior ([facility] [address] [F31.64] 12/31/2021       Plan:     80-year-old  lady with history of morbid obesity status post gastric bypass surgery last close to 200 pounds  Restart multivitamin  History of peptic ulcer disease possible anastomotic ulcer restart pantoprazole 40 mg  Avoid any NSAIDs  Patient patient does daily vaping counseled against the use of vaping  Recheck BMP    1/3  BMP within normal limits  Tolerating PPI   liver enzymes normal    Thank you for the consult. Medicine will sign off. Please do not hesitate to contact us for any issues or concerns. Eren Isbell MD  1/3/2022  1:12 PM    Copy sent to Dr. Jayant Duncan MD    Please note that this chart was generated using voice recognition Dragon dictation software. Although every effort was made to ensure the accuracy of this automated transcription, some errors in transcription may have occurred.

## 2022-01-03 NOTE — PLAN OF CARE
Problem: Altered Mood, Psychotic Behavior:  Goal: Absence of self-harm  Description: Absence of self-harm  1/3/2022 1127 by Nohelia Puri LPN  Outcome: Ongoing  1/2/2022 2224 by Bindu Johnson RN  Outcome: Ongoing  Note: Patient remains absent of self harm. Patient remains absent of self harm. Problem: Altered Mood, Psychotic Behavior:  Goal: Able to verbalize decrease in frequency and intensity of hallucinations  Description: Able to verbalize decrease in frequency and intensity of hallucinations  1/3/2022 1127 by Nohelia Puri LPN  Outcome: Ongoing  1/2/2022 2224 by Bindu Johnson RN  Outcome: Ongoing  Note: Patient is isolative to room most of shift . She is selective social in dayroom, not attending groups. She reports fleeting suicidal thoughts, contracts for safety while on unit. She reports depression and anxiety and requested as needed Atarax to relieve symptoms. Patient utilizes phone to call supports. She is medication compliant. Staff maintains Q 15 minute safety checks. Patient is isolative to room most of the shift.

## 2022-01-03 NOTE — GROUP NOTE
Group Therapy Note    Date: 1/3/2022    Group Start Time: 1430  Group End Time: 6309  Group Topic: Healthy Living/Wellness    [facility] CHRISTINA Shepherd, CTRS    Pt did not attend 1430 wellness group d/t resting in room despite staff invitation to attend. 1:1 talk time offered as alternative to group session, pt declined.           Signature:  Ankur Rainey

## 2022-01-03 NOTE — PLAN OF CARE
Problem: Altered Mood, Psychotic Behavior:  Goal: Able to verbalize decrease in frequency and intensity of hallucinations  Description: Able to verbalize decrease in frequency and intensity of hallucinations  Outcome: Ongoing  Note: Patient is isolative to room most of shift . She is selective social in dayroom, not attending groups. She reports fleeting suicidal thoughts, contracts for safety while on unit. She reports depression and anxiety and requested as needed Atarax to relieve symptoms. Patient utilizes phone to call supports. She is medication compliant. Staff maintains Q 15 minute safety checks. Problem: Tobacco Use:  Goal: Inpatient tobacco use cessation counseling participation  Description: Inpatient tobacco use cessation counseling participation  Outcome: Ongoing  Note: Patient given tobacco quitline number 88461457687 at this time, refusing to call at this time, states \" I just dont want to quit now\"- patient given information as to the dangers of long term tobacco use. Continue to reinforce the importance of tobacco cessation.

## 2022-01-03 NOTE — GROUP NOTE
Group Therapy Note    Date: 1/3/2022    Group Start Time: 7900  Group End Time: 5145  Group Topic: Psychotherapy    SUPRIYA Patel        Group Therapy Note       Patient refused to attend psychotherapy group after encouragement from staff. 1:1 talk time offered but refused. Signature:   Sarahi Patel

## 2022-01-04 PROCEDURE — 6370000000 HC RX 637 (ALT 250 FOR IP): Performed by: PSYCHIATRY & NEUROLOGY

## 2022-01-04 PROCEDURE — 6370000000 HC RX 637 (ALT 250 FOR IP): Performed by: INTERNAL MEDICINE

## 2022-01-04 PROCEDURE — APPSS30 APP SPLIT SHARED TIME 16-30 MINUTES

## 2022-01-04 PROCEDURE — 1240000000 HC EMOTIONAL WELLNESS R&B

## 2022-01-04 PROCEDURE — 99232 SBSQ HOSP IP/OBS MODERATE 35: CPT | Performed by: PSYCHIATRY & NEUROLOGY

## 2022-01-04 RX ADMIN — OXYBUTYNIN CHLORIDE 5 MG: 5 TABLET, EXTENDED RELEASE ORAL at 08:14

## 2022-01-04 RX ADMIN — TRAZODONE HYDROCHLORIDE 50 MG: 50 TABLET ORAL at 20:22

## 2022-01-04 RX ADMIN — LITHIUM CARBONATE 450 MG: 300 CAPSULE, GELATIN COATED ORAL at 17:08

## 2022-01-04 RX ADMIN — PANTOPRAZOLE SODIUM 40 MG: 40 TABLET, DELAYED RELEASE ORAL at 06:50

## 2022-01-04 RX ADMIN — NAPROXEN 250 MG: 250 TABLET ORAL at 11:50

## 2022-01-04 RX ADMIN — ACETAMINOPHEN, ASPIRIN, CAFFEINE 1 TABLET: 250; 65; 250 TABLET, FILM COATED ORAL at 11:50

## 2022-01-04 RX ADMIN — PALIPERIDONE 6 MG: 6 TABLET, EXTENDED RELEASE ORAL at 08:14

## 2022-01-04 RX ADMIN — DIVALPROEX SODIUM 500 MG: 500 TABLET, DELAYED RELEASE ORAL at 21:03

## 2022-01-04 RX ADMIN — DIVALPROEX SODIUM 500 MG: 500 TABLET, DELAYED RELEASE ORAL at 06:50

## 2022-01-04 RX ADMIN — Medication 5000 UNITS: at 11:50

## 2022-01-04 RX ADMIN — DIVALPROEX SODIUM 500 MG: 500 TABLET, DELAYED RELEASE ORAL at 15:51

## 2022-01-04 RX ADMIN — CARBOXYMETHYLCELLULOSE SODIUM 1 DROP: 5 SOLUTION/ DROPS OPHTHALMIC at 06:50

## 2022-01-04 RX ADMIN — MULTIPLE VITAMINS W/ MINERALS TAB 1 TABLET: TAB at 11:50

## 2022-01-04 RX ADMIN — LEVOTHYROXINE SODIUM 50 MCG: 0.05 TABLET ORAL at 06:50

## 2022-01-04 RX ADMIN — OXYBUTYNIN CHLORIDE 5 MG: 5 TABLET, EXTENDED RELEASE ORAL at 20:22

## 2022-01-04 RX ADMIN — LITHIUM CARBONATE 450 MG: 300 CAPSULE, GELATIN COATED ORAL at 08:14

## 2022-01-04 ASSESSMENT — PAIN - FUNCTIONAL ASSESSMENT
PAIN_FUNCTIONAL_ASSESSMENT: 0-10
PAIN_FUNCTIONAL_ASSESSMENT: 0-10

## 2022-01-04 ASSESSMENT — PAIN SCALES - GENERAL: PAINLEVEL_OUTOF10: 0

## 2022-01-04 NOTE — GROUP NOTE
Group Therapy Note    Date: 1/4/2022    Group Start Time: 1000  Group End Time: 9378  Group Topic: Psychotherapy    [facility]    Edson Fernandes        Group Therapy Note         Patient refused to attend psychotherapy group after encouragement from staff. 1:1 talk time offered but refused. Signature:   Edson Fernandes

## 2022-01-04 NOTE — PROGRESS NOTES
Daily Progress Note  1/4/2022    Patient Name: Linda Daily    CHIEF COMPLAINT:  Suicidal ideation due to auditory hallucinations that are command in nature          SUBJECTIVE:      Patient is seen today for a follow up assessment. Patient is compliant with scheduled medications. Patient has not received emergency medications in the past 24 hours. Patient is agreeable to interview in her room today. She continues to be severely down and depressed. She rates her depression as a an 8 (0-10 scale 0 being none and 10 being the worst). She continues to endorse severe anxiety as well. She continues to endorse poor sleep stating that she wakes up multiple times throughout the night and is hard for her to fall back asleep. She reports that her appetite has not been great however she continues to utilize the Ensure protein shakes. Antione Styles endorses active suicidal ideation without current plan or intent. She denies homicidal ideation. She is able to contract for safety on this unit with this writer but would not be safe off the unit. She continues to endorse auditory hallucinations. She states the voices are \"up and down\" throughout the day. She states they continue to tell her \"bad things. \"  She states \"this is why I am lying in my room all the time, they get worse when I go out there. \"  She denies visual hallucinations. She endorses significant paranoia due to the voices. She denies medication side effects or medical concerns at this time. She is due to receive her [place] to 34 mg injection tomorrow 1/5. Patient has been isolating to her room and not participating in groups on the unit. Writer encouraged patient to attend groups on the unit. At this time, the patient is not appropriate for a lower level of care. There is risk of decompensation and patient warrants further hospitalization for safety and stabilization.     Appetite:  [x] Normal/Adequate/Unchanged  [] Increased  [x] Decreased      Sleep:       [] Normal/Adequate/Unchanged  [] Fair  [x] Poor      Group Attendance on Unit:   [] Yes  [] Selectively    [x] No    Medication Side Effects:  Patient denies any medication side effects at the time of assessment. Mental Status Exam  Level of consciousness: Alert and awake. Appearance: Appropriate attire for setting, resting in bed, with poor grooming and hygiene. Behavior/Motor: Approachable, psychomotor slowing  Attitude toward examiner: Cooperative, attentive, fair eye contact. Speech: Normal rate, quiet volume, depressed tone. Mood:  Patient reports \"I feel like crap\". Affect: Flat, depressed  Thought processes: Linear and coherent. Thought content: Denies homicidal ideation. Suicidal Ideation: Active suicidal ideations, without current plan or intent, contracts for safety on the unit. Delusions: No evidence of delusions. Endorses paranoia. Perceptual Disturbance: Patient does not appear to be responding to internal stimuli. Endorses auditory hallucinations. Denies visual hallucinations. Cognition: Oriented to self, location, time, and situation. Memory: Intact. Insight & Judgement: Poor. Data   height is 5' 4\" (1.626 m) and weight is 144 lb (65.3 kg). Her temperature is 98.8 °F (37.1 °C). Her blood pressure is 90/51 (abnormal) and her pulse is 70. Her respiration is 12.    Labs:   Admission on 12/31/2021   Component Date Value Ref Range Status    Glucose 01/03/2022 85  70 - 99 mg/dL Final    BUN 01/03/2022 11  6 - 20 mg/dL Final    CREATININE 01/03/2022 0.67  0.50 - 0.90 mg/dL Final    Bun/Cre Ratio 01/03/2022 NOT REPORTED  9 - 20 Final    Calcium 01/03/2022 8.8  8.6 - 10.4 mg/dL Final    Sodium 01/03/2022 139  135 - 144 mmol/L Final    Potassium 01/03/2022 4.6  3.7 - 5.3 mmol/L Final    Chloride 01/03/2022 105  98 - 107 mmol/L Final    CO2 01/03/2022 28  20 - 31 mmol/L Final    Anion Gap 01/03/2022 6* 9 - 17 mmol/L Final    GFR Non-African American 01/03/2022 >60  >60 mL/min Final    GFR  01/03/2022 >60  >60 mL/min Final    GFR Comment 01/03/2022        Final    Comment: Average GFR for 30-36 years old:   107 mL/min/1.73sq m  Chronic Kidney Disease:   <60 mL/min/1.73sq m  Kidney failure:   <15 mL/min/1.73sq m              eGFR calculated using average adult body mass. Additional eGFR calculator available at:        [employer]            GFR Staging 01/03/2022 NOT REPORTED   Final    TSH 01/03/2022 1.58  0.30 - 5.00 mIU/L Final    Albumin 01/03/2022 3.3* 3.5 - 5.2 g/dL Final    Alkaline Phosphatase 01/03/2022 56  35 - 104 U/L Final    ALT 01/03/2022 9  5 - 33 U/L Final    AST 01/03/2022 11  <32 U/L Final    Total Bilirubin 01/03/2022 <0.15* 0.3 - 1.2 mg/dL Final    Bilirubin, Direct 01/03/2022 <0.08  <0.31 mg/dL Final    Bilirubin, Indirect 01/03/2022 Can not be calculated  0.00 - 1.00 mg/dL Final    Total Protein 01/03/2022 5.5* 6.4 - 8.3 g/dL Final    Globulin 01/03/2022 NOT REPORTED  1.5 - 3.8 g/dL Final    Albumin/Globulin Ratio 01/03/2022 NOT REPORTED  1.0 - 2.5 Final         Reviewed patient's current plan of care and vital signs with nursing staff.     Labs reviewed: [x] Yes      Medications  Current Facility-Administered Medications: [START ON 1/5/2022] paliperidone palmitate ER (INVEGA SUSTENNA) IM injection 234 mg, 234 mg, IntraMUSCular, Once  lithium capsule 450 mg, 450 mg, Oral, BID WC  pantoprazole (PROTONIX) tablet 40 mg, 40 mg, Oral, QAM AC  therapeutic multivitamin-minerals 1 tablet, 1 tablet, Oral, Daily  vitamin D3 (CHOLECALCIFEROL) tablet 5,000 Units, 5,000 Units, Oral, Daily  oxybutynin (DITROPAN-XL) extended release tablet 5 mg, 5 mg, Oral, BID  naproxen (NAPROSYN) tablet 250 mg, 250 mg, Oral, 4x Daily PRN  carboxymethylcellulose (REFRESH PLUS) 0.5 % ophthalmic solution 1 drop, 1 drop, Both Eyes, BID PRN  divalproex (DEPAKOTE) DR tablet 500 mg, 500 mg, Oral, 3 times per day  levothyroxine (SYNTHROID) tablet 50 mcg, 50 mcg, Oral, Daily  aspirin-acetaminophen-caffeine (EXCEDRIN MIGRAINE) per tablet 1 tablet, 1 tablet, Oral, Q6H PRN  paliperidone (INVEGA) extended release tablet 6 mg, 6 mg, Oral, Daily  acetaminophen (TYLENOL) tablet 650 mg, 650 mg, Oral, Q4H PRN  aluminum & magnesium hydroxide-simethicone (MAALOX) 200-200-20 MG/5ML suspension 30 mL, 30 mL, Oral, Q6H PRN  hydrOXYzine (ATARAX) tablet 50 mg, 50 mg, Oral, TID PRN  nicotine (NICODERM CQ) 14 MG/24HR 1 patch, 1 patch, TransDERmal, Daily  polyethylene glycol (GLYCOLAX) packet 17 g, 17 g, Oral, Daily PRN  traZODone (DESYREL) tablet 50 mg, 50 mg, Oral, Nightly PRN  haloperidol (HALDOL) tablet 5 mg, 5 mg, Oral, Q4H PRN **AND** LORazepam (ATIVAN) tablet 2 mg, 2 mg, Oral, Q4H PRN  haloperidol lactate (HALDOL) injection 5 mg, 5 mg, IntraMUSCular, Q4H PRN **AND** LORazepam (ATIVAN) injection 2 mg, 2 mg, IntraMUSCular, Q4H PRN **AND** diphenhydrAMINE (BENADRYL) injection 50 mg, 50 mg, IntraMUSCular, Q4H PRN    ASSESSMENT  Bipolar affective disorder, mixed, severe, with psychotic behavior ([facility] [address]         PLAN  Patient symptoms are: Remains Unstable. Continue current medication regimen. Wong Splinter Sustenna 234 mg injection scheduled for 1/5/2022  Monitor need and frequency of PRN medications. Encourage participation in groups and milieu. Attempt to develop insight. Psycho-education conducted. Supportive Therapy conducted. Probable discharge is to be determined by MD.   Follow-up daily while inpatient. Patient continues to be monitored in the inpatient psychiatric facility at [facility] for safety and stabilization. Patient continues to need, on a daily basis, active treatment furnished directly by or requiring the supervision of inpatient psychiatric personnel. Electronically signed by SILVANO Daniel CNP on 1/4/2022 at 2:03 PM    **This report has been created using voice recognition software.  It may contain minor

## 2022-01-04 NOTE — PLAN OF CARE
Problem: Altered Mood, Psychotic Behavior:  Goal: Absence of self-harm  Description: Absence of self-harm  1/3/2022 2237 by Zuleika Guerrero RN  Outcome: Ongoing     Problem: Altered Mood, Psychotic Behavior:  Goal: Able to verbalize decrease in frequency and intensity of hallucinations  Description: Able to verbalize decrease in frequency and intensity of hallucinations  1/3/2022 2237 by Zuleika Guerrero RN  Outcome: Ongoing   Patient remains isolative to bed out for needs and phone calls. Patient accepting of evening medication and talk time. Reports she is feeling better, denies hallucinations and thoughts to harm self this shift. Encouraged fluids for low blood pressures.

## 2022-01-04 NOTE — GROUP NOTE
Group Therapy Note    Date: 1/4/2022    Group Start Time: 1100  Group End Time: 4230  Group Topic: Cognitive Skills    SUPRIYA Farrell, CTRS        Group Therapy Note    Attendees: 11/20         Pt did not participate in Cognitive Skills Group at 11:00AM when encouraged by RT due to resting in room. Pt was offered talk time as an alternative to group but declined.          Discipline Responsible: Psychoeducational Specialist        Signature:  Beau Cortez

## 2022-01-04 NOTE — GROUP NOTE
Group Therapy Note    Date: 1/4/2022    Group Start Time: 1430  Group End Time: 1520  Group Topic: Cognitive Skills    SUPRIYA BHABENA Cosme, CTRS        Group Therapy Note    Attendees: 11/19         Pt did not participate in Cognitive Skills Group at 14:30 when encouraged by RT due to resting in room. Pt was offered talk time as an alternative to group but declined.          Discipline Responsible: Psychoeducational Specialist        Signature:  Ragini Vicente

## 2022-01-05 LAB
SARS-COV-2, RAPID: DETECTED
SPECIMEN DESCRIPTION: ABNORMAL

## 2022-01-05 PROCEDURE — 87635 SARS-COV-2 COVID-19 AMP PRB: CPT

## 2022-01-05 PROCEDURE — 6370000000 HC RX 637 (ALT 250 FOR IP): Performed by: PSYCHIATRY & NEUROLOGY

## 2022-01-05 PROCEDURE — 99232 SBSQ HOSP IP/OBS MODERATE 35: CPT | Performed by: PSYCHIATRY & NEUROLOGY

## 2022-01-05 PROCEDURE — APPSS30 APP SPLIT SHARED TIME 16-30 MINUTES

## 2022-01-05 PROCEDURE — 6370000000 HC RX 637 (ALT 250 FOR IP): Performed by: INTERNAL MEDICINE

## 2022-01-05 PROCEDURE — 8E0ZXY6 ISOLATION: ICD-10-PCS | Performed by: PSYCHIATRY & NEUROLOGY

## 2022-01-05 PROCEDURE — 1240000000 HC EMOTIONAL WELLNESS R&B

## 2022-01-05 RX ADMIN — HYDROXYZINE HYDROCHLORIDE 50 MG: 50 TABLET, FILM COATED ORAL at 08:20

## 2022-01-05 RX ADMIN — PANTOPRAZOLE SODIUM 40 MG: 40 TABLET, DELAYED RELEASE ORAL at 06:23

## 2022-01-05 RX ADMIN — CARBOXYMETHYLCELLULOSE SODIUM 1 DROP: 5 SOLUTION/ DROPS OPHTHALMIC at 08:20

## 2022-01-05 RX ADMIN — DIVALPROEX SODIUM 500 MG: 500 TABLET, DELAYED RELEASE ORAL at 13:51

## 2022-01-05 RX ADMIN — LITHIUM CARBONATE 450 MG: 300 CAPSULE, GELATIN COATED ORAL at 17:11

## 2022-01-05 RX ADMIN — TRAZODONE HYDROCHLORIDE 50 MG: 50 TABLET ORAL at 20:47

## 2022-01-05 RX ADMIN — HYDROXYZINE HYDROCHLORIDE 50 MG: 50 TABLET, FILM COATED ORAL at 20:47

## 2022-01-05 RX ADMIN — LEVOTHYROXINE SODIUM 50 MCG: 0.05 TABLET ORAL at 06:23

## 2022-01-05 RX ADMIN — CARBOXYMETHYLCELLULOSE SODIUM 1 DROP: 5 SOLUTION/ DROPS OPHTHALMIC at 06:22

## 2022-01-05 RX ADMIN — MULTIPLE VITAMINS W/ MINERALS TAB 1 TABLET: TAB at 08:20

## 2022-01-05 RX ADMIN — OXYBUTYNIN CHLORIDE 5 MG: 5 TABLET, EXTENDED RELEASE ORAL at 17:11

## 2022-01-05 RX ADMIN — PALIPERIDONE 6 MG: 6 TABLET, EXTENDED RELEASE ORAL at 08:20

## 2022-01-05 RX ADMIN — LITHIUM CARBONATE 450 MG: 300 CAPSULE, GELATIN COATED ORAL at 08:20

## 2022-01-05 RX ADMIN — Medication 5000 UNITS: at 08:20

## 2022-01-05 RX ADMIN — OXYBUTYNIN CHLORIDE 5 MG: 5 TABLET, EXTENDED RELEASE ORAL at 20:47

## 2022-01-05 RX ADMIN — DIVALPROEX SODIUM 500 MG: 500 TABLET, DELAYED RELEASE ORAL at 06:23

## 2022-01-05 ASSESSMENT — PAIN DESCRIPTION - LOCATION: LOCATION: CHEST

## 2022-01-05 ASSESSMENT — PAIN DESCRIPTION - ORIENTATION: ORIENTATION: RIGHT;LEFT

## 2022-01-05 ASSESSMENT — PAIN SCALES - GENERAL
PAINLEVEL_OUTOF10: 6
PAINLEVEL_OUTOF10: 9

## 2022-01-05 NOTE — PLAN OF CARE
Problem: Altered Mood, Psychotic Behavior:  Goal: Able to verbalize decrease in frequency and intensity of hallucinations  Description: Able to verbalize decrease in frequency and intensity of hallucinations  1/5/2022 0826 by Stephani Locke RN  Note: Ms. Jerrica Tran engages in meaningful conversation absent of psychotic features. She is organized and goal directed. She denies auditory and visual hallucinations. Ms. Jerrica Tran adheres to her medication as prescribed. She has denies suicidal ideation and thoughts of harm to self and others. Safety rounds continued. Problem: Tobacco Use:  Goal: Inpatient tobacco use cessation counseling participation  Description: Inpatient tobacco use cessation counseling participation  1/5/2022 0826 by Stephani Locke RN  Note: Ms. Jerrica Tran utilizes her nicotine patch as prescribed. Denies smoking cessation education this time.

## 2022-01-05 NOTE — PROGRESS NOTES
Daily Progress Note  1/5/2022    Patient Name: Luis Box COMPLAINT:  Suicidal ideation due to auditory hallucinations that are command in nature          SUBJECTIVE:      Patient is seen today for a follow up assessment. Patient is compliant with scheduled medications. Patient has not received emergency medications in the past 24 hours. Patient is agreeable to interview in her room today where she is lying in bed. Patient continues to endorse significant depression and anxiety. She reports that she has seen little improvement in her mood. She continues to report that she is sleeping poorly throughout the night. She states that her appetite is fair. Princess Bradshaws endorses active suicidal ideation without current plan or intent. She denies homicidal ideation. She was able to contract for safety on this unit with this writer. Patient continues to be very paranoid and asks this writer \"can you guys tell my family anything? \"  This writer explains to patient that we cannot speak with anyone unless she signs an FLORI. She asks this writer to double check that she did not sign any ROIs while she was here. She endorses auditory hallucinations that continue to tell her \"bad things and that people are out to get me. \" She denies visual hallucinations. She reports that her legs are \"cramping. \" She states, \"What medication did you increase because I think that is what is causing my legs to cramp? \" This writer informs patient that the only medication increased while she was here was [place] so that she could receive the shot. This writer encouraged patient to take as needed tylenol or Naproxen for pain. Writer encouraged patient to attend groups on the unit. At this time, the patient is not appropriate for a lower level of care. There is risk of decompensation and patient warrants further hospitalization for safety and stabilization.     Appetite:  [] Normal/Adequate/Unchanged  [] Increased  [x] Decreased Non- 01/03/2022 >60  >60 mL/min Final    GFR  01/03/2022 >60  >60 mL/min Final    GFR Comment 01/03/2022        Final    Comment: Average GFR for 30-36 years old:   107 mL/min/1.73sq m  Chronic Kidney Disease:   <60 mL/min/1.73sq m  Kidney failure:   <15 mL/min/1.73sq m              eGFR calculated using average adult body mass. Additional eGFR calculator available at:        [employer]            GFR Staging 01/03/2022 NOT REPORTED   Final    TSH 01/03/2022 1.58  0.30 - 5.00 mIU/L Final    Albumin 01/03/2022 3.3* 3.5 - 5.2 g/dL Final    Alkaline Phosphatase 01/03/2022 56  35 - 104 U/L Final    ALT 01/03/2022 9  5 - 33 U/L Final    AST 01/03/2022 11  <32 U/L Final    Total Bilirubin 01/03/2022 <0.15* 0.3 - 1.2 mg/dL Final    Bilirubin, Direct 01/03/2022 <0.08  <0.31 mg/dL Final    Bilirubin, Indirect 01/03/2022 Can not be calculated  0.00 - 1.00 mg/dL Final    Total Protein 01/03/2022 5.5* 6.4 - 8.3 g/dL Final    Globulin 01/03/2022 NOT REPORTED  1.5 - 3.8 g/dL Final    Albumin/Globulin Ratio 01/03/2022 NOT REPORTED  1.0 - 2.5 Final         Reviewed patient's current plan of care and vital signs with nursing staff.     Labs reviewed: [x] Yes      Medications  Current Facility-Administered Medications: lithium capsule 450 mg, 450 mg, Oral, BID WC  pantoprazole (PROTONIX) tablet 40 mg, 40 mg, Oral, QAM AC  therapeutic multivitamin-minerals 1 tablet, 1 tablet, Oral, Daily  vitamin D3 (CHOLECALCIFEROL) tablet 5,000 Units, 5,000 Units, Oral, Daily  oxybutynin (DITROPAN-XL) extended release tablet 5 mg, 5 mg, Oral, BID  naproxen (NAPROSYN) tablet 250 mg, 250 mg, Oral, 4x Daily PRN  carboxymethylcellulose (REFRESH PLUS) 0.5 % ophthalmic solution 1 drop, 1 drop, Both Eyes, BID PRN  divalproex (DEPAKOTE) DR tablet 500 mg, 500 mg, Oral, 3 times per day  levothyroxine (SYNTHROID) tablet 50 mcg, 50 mcg, Oral, Daily  aspirin-acetaminophen-caffeine (EXCEDRIN MIGRAINE) per tablet 1 tablet, 1 tablet, Oral, Q6H PRN  paliperidone (INVEGA) extended release tablet 6 mg, 6 mg, Oral, Daily  acetaminophen (TYLENOL) tablet 650 mg, 650 mg, Oral, Q4H PRN  aluminum & magnesium hydroxide-simethicone (MAALOX) 200-200-20 MG/5ML suspension 30 mL, 30 mL, Oral, Q6H PRN  hydrOXYzine (ATARAX) tablet 50 mg, 50 mg, Oral, TID PRN  nicotine (NICODERM CQ) 14 MG/24HR 1 patch, 1 patch, TransDERmal, Daily  polyethylene glycol (GLYCOLAX) packet 17 g, 17 g, Oral, Daily PRN  traZODone (DESYREL) tablet 50 mg, 50 mg, Oral, Nightly PRN  haloperidol (HALDOL) tablet 5 mg, 5 mg, Oral, Q4H PRN **AND** LORazepam (ATIVAN) tablet 2 mg, 2 mg, Oral, Q4H PRN  haloperidol lactate (HALDOL) injection 5 mg, 5 mg, IntraMUSCular, Q4H PRN **AND** LORazepam (ATIVAN) injection 2 mg, 2 mg, IntraMUSCular, Q4H PRN **AND** diphenhydrAMINE (BENADRYL) injection 50 mg, 50 mg, IntraMUSCular, Q4H PRN    ASSESSMENT  Bipolar affective disorder, mixed, severe, with psychotic behavior ([facility] [address]         PLAN  Patient symptoms are: Remains Unstable. Continue current medication regimen. Administer Nathalie Copping Sustenna 234 mg injection today  Monitor need and frequency of PRN medications. Encourage participation in groups and milieu. Attempt to develop insight. Psycho-education conducted. Supportive Therapy conducted. Probable discharge is to be determined by MD.   Follow-up daily while inpatient. Patient continues to be monitored in the inpatient psychiatric facility at [facility] for safety and stabilization. Patient continues to need, on a daily basis, active treatment furnished directly by or requiring the supervision of inpatient psychiatric personnel. Electronically signed by SILVANO Mccormick CNP on 1/5/2022 at 2:47 PM    **This report has been created using voice recognition software. It may contain minor errors which are inherent in voice recognition technology. **    I independently saw and evaluated the patient. I reviewed the midlevel provider's documentation above. Any additional comments or changes to the midlevel provider's documentation are stated below otherwise agree with assessment. The patient was seen at bedside. She feels somewhat fatigued. She has been isolative to her room. The patient continues to feel hopeless and helpless. She has ongoing auditory hallucinations. She is willing to take medication. She continues to be reluctant about discharge and wants to spend as much time as possible in the hospital.    I was informed later that the patient was complaining of some symptoms which could be Covid related. She subsequently tested positive for Covid and was moved to the Covid unit. PLAN  Medications as noted above  Attempt to develop insight  Psycho-education conducted. Supportive Therapy conducted.   Probable discharge is 4-9 days  Follow-up daily while on inpatient unit    Electronically signed by Kay Flores MD on 1/5/22 at 7:48 PM EST

## 2022-01-05 NOTE — GROUP NOTE
Group Therapy Note    Date: 1/5/2022    Group Start Time: 1100  Group End Time: 0800  Group Topic: Cognitive Skills    SUPRIYA Salguero, CTRS    Pt did not attend 1100 cognitive skills group d/t resting in room despite staff invitation to attend. 1:1 talk time offered as alternative to group session, pt declined.           Signature:  Debbie Alejo

## 2022-01-05 NOTE — BH NOTE
Pt declined to attend morning goal setting/community meeting group despite encouragement to attend. Pt offered 1:1 talk time as an alternative to group. Pt also declined.

## 2022-01-05 NOTE — PLAN OF CARE
Problem: Altered Mood, Psychotic Behavior:  Goal: Able to verbalize decrease in frequency and intensity of hallucinations  Description: Able to verbalize decrease in frequency and intensity of hallucinations  1/4/2022 2125 by Lili Marin RN  Outcome: Ongoing   1:1 with pt x ten minutes. Pt encouraged to attend unit programming and interact with peers and staff. Pt also encouraged to tend to hygiene and ADLs. Pt encouraged to discuss feelings with staff and feedback and reassurance provided. Pt denies thoughts of self harm and is agreeable to seeking out should thoughts of self harm arise. Safe environment maintained. Q15 minute checks for safety cont per unit policy. Will cont to monitor for safety and provides support and reassurance as needed. Pt. Is focused on medications. Pt is seclusive to self et room. Compliant with medications. Depressed with a flat affect.

## 2022-01-05 NOTE — GROUP NOTE
Group Therapy Note    Date: 1/5/2022    Group Start Time: 1000  Group End Time: 4212  Group Topic: Psychotherapy    [facility]    MELI Browne, MENG        Group Therapy Note    Attendees:  9/22         Patient was offered group therapy today but declined to participate despite encouragement from staff. 1:1 was offered.     Signature:  MELI Browne, MENG

## 2022-01-05 NOTE — BH NOTE
Ms. Ramiro Decker has complained of a cough throughout the shift. She approached this writer and noted she had a rash on her abdomen and upper legs/behind the knee. She complained of pruritus to the areas. Writer assessed areas and vitals. MD was notified of complaints, Rapid Covid test ordered.

## 2022-01-06 PROCEDURE — 1240000000 HC EMOTIONAL WELLNESS R&B

## 2022-01-06 PROCEDURE — 6370000000 HC RX 637 (ALT 250 FOR IP): Performed by: PSYCHIATRY & NEUROLOGY

## 2022-01-06 PROCEDURE — 6370000000 HC RX 637 (ALT 250 FOR IP): Performed by: INTERNAL MEDICINE

## 2022-01-06 PROCEDURE — APPSS30 APP SPLIT SHARED TIME 16-30 MINUTES: Performed by: NURSE PRACTITIONER

## 2022-01-06 PROCEDURE — 99232 SBSQ HOSP IP/OBS MODERATE 35: CPT | Performed by: PSYCHIATRY & NEUROLOGY

## 2022-01-06 RX ADMIN — LITHIUM CARBONATE 450 MG: 300 CAPSULE, GELATIN COATED ORAL at 09:26

## 2022-01-06 RX ADMIN — OXYBUTYNIN CHLORIDE 5 MG: 5 TABLET, EXTENDED RELEASE ORAL at 20:34

## 2022-01-06 RX ADMIN — PANTOPRAZOLE SODIUM 40 MG: 40 TABLET, DELAYED RELEASE ORAL at 09:26

## 2022-01-06 RX ADMIN — LEVOTHYROXINE SODIUM 50 MCG: 0.05 TABLET ORAL at 09:27

## 2022-01-06 RX ADMIN — PALIPERIDONE 6 MG: 6 TABLET, EXTENDED RELEASE ORAL at 09:27

## 2022-01-06 RX ADMIN — LITHIUM CARBONATE 450 MG: 300 CAPSULE, GELATIN COATED ORAL at 17:00

## 2022-01-06 RX ADMIN — TRAZODONE HYDROCHLORIDE 50 MG: 50 TABLET ORAL at 20:34

## 2022-01-06 RX ADMIN — HYDROXYZINE HYDROCHLORIDE 50 MG: 50 TABLET, FILM COATED ORAL at 20:34

## 2022-01-06 RX ADMIN — Medication 5000 UNITS: at 09:27

## 2022-01-06 RX ADMIN — OXYBUTYNIN CHLORIDE 5 MG: 5 TABLET, EXTENDED RELEASE ORAL at 09:27

## 2022-01-06 RX ADMIN — MULTIPLE VITAMINS W/ MINERALS TAB 1 TABLET: TAB at 09:27

## 2022-01-06 NOTE — BH NOTE
Nasopharyngeal covid swab test completed. Ms. Bess Tucker was cooperative and tolerated the procedure. Specimen sent to lab.

## 2022-01-06 NOTE — PLAN OF CARE
Problem: Altered Mood, Psychotic Behavior:  Goal: Able to verbalize decrease in frequency and intensity of hallucinations  Description: Able to verbalize decrease in frequency and intensity of hallucinations  1/5/2022 2036 by Charline Van, RN  Outcome: Ongoing  Note: Patient is isolative to room during this shift. She reports depression, anxiety and fleeting suicidal thoughts. Patient contracts for safety while on unit. Patient is tearful when given results of covid test. Patient states when she had H1N1 she very ill and is concerned about having covid. Patient was given support by staff and transferred to covid unit. Problem: Altered Mood, Psychotic Behavior:  Goal: Absence of self-harm  Description: Absence of self-harm  Outcome: Ongoing  Note: Patient remains absent of self harm during this shift. Problem: Tobacco Use:  Goal: Inpatient tobacco use cessation counseling participation  Description: Inpatient tobacco use cessation counseling participation  1/5/2022 2036 by Charline Van, RN  Outcome: Ongoing  Note: Patient given tobacco quitline number 45460233428 at this time, refusing to call at this time, states \" I just dont want to quit now\"- patient given information as to the dangers of long term tobacco use. Continue to reinforce the importance of tobacco cessation. Problem: Airway Clearance - Ineffective  Goal: Achieve or maintain patent airway  Outcome: Ongoing  Note: Patient has patent airway. Problem: Gas Exchange - Impaired  Goal: Absence of hypoxia  Outcome: Ongoing  Note: Patient remains absent of hypoxia. Problem: Body Temperature -  Risk of, Imbalanced  Goal: Ability to maintain a body temperature within defined limits  Outcome: Ongoing  Note: Patient maintains body temperature within define limits.

## 2022-01-06 NOTE — BH NOTE
Pepito Oro returned writer's call. Informed to observe other pt's. For covid symptoms as well as staff.

## 2022-01-06 NOTE — PLAN OF CARE
Problem: Altered Mood, Psychotic Behavior:  Goal: Able to verbalize decrease in frequency and intensity of hallucinations  Description: Able to verbalize decrease in frequency and intensity of hallucinations  1/6/2022 0949 by Cayla Rayo LPN  Outcome: Ongoing  Note: Patient admits to having auditory hallucinations that are negative in nature. Patient states \" they are screaming at me\". Patient able to maintain control at this time. instructed on and reviewed safety, seeking help from staff when needed. Patient verbalizes understanding and agrees. Pt provided with reality orientation as needed and is accepting of support and reassurance given by staff. Problem: Altered Mood, Psychotic Behavior:  Goal: Absence of self-harm  Description: Absence of self-harm  1/6/2022 0949 by Cayla Rayo LPN  Outcome: Ongoing  Note: Patient is flat, thought blocked during 1:1, patient able to communicate thoughts and concerns. Patient admits to fleeting thoughts of self harm, no plan at this time. No negative behaviors observed at this time. Patient able to eat morning meal in day area. Patient tolerated morning medications without difficulty. Patient able to commit to safety at this time, safety checks maintained as ordered.

## 2022-01-06 NOTE — CARE COORDINATION
Social work offered 1:1 talk time rather than group, patient was not willing to engage at this time stating she was tired.

## 2022-01-06 NOTE — BH NOTE
Left message with Leann Mercer infection control this pt. On Olesya is covid positive, has stayed in her room and wearing a mask, pt. Transferred to [facility] unit and return call back number provided.

## 2022-01-06 NOTE — PROGRESS NOTES
Daily Progress Note  1/6/2022    Patient Name: Stan Martinez COMPLAINT:  Suicidal ideation due to auditory hallucinations that are command in nature          SUBJECTIVE:    Patient is seen today for a follow up assessment. Nursing staff report patient has been medication adherent with most medications but began refusing Depakote last night. She has refused at all day today as well. She has been behaviorally in control. She continues to isolate to her room for much of the day but comes out for meals and needs. Patient has not required any emergency medications in the last 24 hours. She is agreeable to conversation in her room. She continues to endorse ongoing low mood and significant symptoms of depression and anxiety with little relief. She continues to feel hopeless and helpless. She describes suicidal ideation as fleeting and she has no current plan or intent to harm herself. Patient's refusal to take Depakote was addressed and she states that it \"makes my legs ache\". She expresses intermittent paranoia that people are after her want to hurt her. She is also experiencing ongoing auditory hallucinations that are derogatory. She is experiencing modest improvement in frequency and intensity. She is reporting some symptoms of Covid infection such as body aches and dry cough. She is also experiencing fatigue. Patient continues to contract for safety on the unit however at this time she is unable to contract for safety in the community. Appetite:  [] Normal/Adequate/Unchanged  [] Increased  [x] Decreased      Sleep:       [] Normal/Adequate/Unchanged  [] Fair  [x] Poor      Group Attendance on Unit:   [] Yes  [x] Selectively    [] No    Medication Side Effects: Patient reports BLE muscle pain         Mental Status Exam  Level of consciousness: Alert and awake. Appearance: Appropriate attire for setting, resting in bed, with poor grooming and hygiene.    Behavior/Motor: Approachable, psychomotor slowing  Attitude toward examiner: Cooperative, attentive, fair eye contact. Speech: Normal rate, quiet volume, depressed tone. Mood:  Patient reports \"not good\". Affect: Flat, depressed  Thought processes: Linear and coherent. Thought content: Denies homicidal ideation. Suicidal Ideation: Active suicidal ideations, without current plan or intent, contracts for safety on the unit. Delusions: No evidence of delusions. Endorses paranoia and appears very paranoid. Perceptual Disturbance: Patient does not appear to be responding to internal stimuli. Endorses auditory hallucinations. Denies visual hallucinations. Cognition: Oriented to self, location, time, and situation. Memory: Intact. Insight & Judgement: Poor. Data   height is 5' 4\" (1.626 m) and weight is 144 lb (65.3 kg). Her oral temperature is 98 °F (36.7 °C). Her blood pressure is 100/67 and her pulse is 86. Her respiration is 14 and oxygen saturation is 98%. Labs:   Admission on 12/31/2021   Component Date Value Ref Range Status    Glucose 01/03/2022 85  70 - 99 mg/dL Final    BUN 01/03/2022 11  6 - 20 mg/dL Final    CREATININE 01/03/2022 0.67  0.50 - 0.90 mg/dL Final    Bun/Cre Ratio 01/03/2022 NOT REPORTED  9 - 20 Final    Calcium 01/03/2022 8.8  8.6 - 10.4 mg/dL Final    Sodium 01/03/2022 139  135 - 144 mmol/L Final    Potassium 01/03/2022 4.6  3.7 - 5.3 mmol/L Final    Chloride 01/03/2022 105  98 - 107 mmol/L Final    CO2 01/03/2022 28  20 - 31 mmol/L Final    Anion Gap 01/03/2022 6* 9 - 17 mmol/L Final    GFR Non- 01/03/2022 >60  >60 mL/min Final    GFR  01/03/2022 >60  >60 mL/min Final    GFR Comment 01/03/2022        Final    Comment: Average GFR for 30-36 years old:   80 mL/min/1.73sq m  Chronic Kidney Disease:   <60 mL/min/1.73sq m  Kidney failure:   <15 mL/min/1.73sq m              eGFR calculated using average adult body mass.  Additional eGFR calculator available at:

## 2022-01-07 PROCEDURE — 99232 SBSQ HOSP IP/OBS MODERATE 35: CPT | Performed by: PSYCHIATRY & NEUROLOGY

## 2022-01-07 PROCEDURE — APPSS30 APP SPLIT SHARED TIME 16-30 MINUTES: Performed by: NURSE PRACTITIONER

## 2022-01-07 PROCEDURE — 6370000000 HC RX 637 (ALT 250 FOR IP): Performed by: INTERNAL MEDICINE

## 2022-01-07 PROCEDURE — 6370000000 HC RX 637 (ALT 250 FOR IP): Performed by: PSYCHIATRY & NEUROLOGY

## 2022-01-07 PROCEDURE — 1240000000 HC EMOTIONAL WELLNESS R&B

## 2022-01-07 RX ADMIN — CARBOXYMETHYLCELLULOSE SODIUM 1 DROP: 5 SOLUTION/ DROPS OPHTHALMIC at 08:36

## 2022-01-07 RX ADMIN — PANTOPRAZOLE SODIUM 40 MG: 40 TABLET, DELAYED RELEASE ORAL at 08:29

## 2022-01-07 RX ADMIN — MULTIPLE VITAMINS W/ MINERALS TAB 1 TABLET: TAB at 08:29

## 2022-01-07 RX ADMIN — Medication 5000 UNITS: at 08:29

## 2022-01-07 RX ADMIN — PALIPERIDONE 6 MG: 6 TABLET, EXTENDED RELEASE ORAL at 08:29

## 2022-01-07 RX ADMIN — DIVALPROEX SODIUM 500 MG: 500 TABLET, DELAYED RELEASE ORAL at 20:45

## 2022-01-07 RX ADMIN — OXYBUTYNIN CHLORIDE 5 MG: 5 TABLET, EXTENDED RELEASE ORAL at 20:45

## 2022-01-07 RX ADMIN — LEVOTHYROXINE SODIUM 50 MCG: 0.05 TABLET ORAL at 07:03

## 2022-01-07 RX ADMIN — TRAZODONE HYDROCHLORIDE 50 MG: 50 TABLET ORAL at 20:45

## 2022-01-07 RX ADMIN — CARBOXYMETHYLCELLULOSE SODIUM 1 DROP: 5 SOLUTION/ DROPS OPHTHALMIC at 20:50

## 2022-01-07 RX ADMIN — HYDROXYZINE HYDROCHLORIDE 50 MG: 50 TABLET, FILM COATED ORAL at 20:45

## 2022-01-07 RX ADMIN — OXYBUTYNIN CHLORIDE 5 MG: 5 TABLET, EXTENDED RELEASE ORAL at 08:29

## 2022-01-07 NOTE — PLAN OF CARE
[address] [facility] Interdisciplinary Treatment Plan Note     Review Date & Time: 1/7/2022   1635    Admission Type:   Admission Type: Voluntary    Reason for admission:  Reason for Admission: Increase in racing thoughts, paranoia, and auditory hallucinations    Estimated Length of Stay Update:  Est 3-7 days, to be determined by physician  Estimated Discharge Date Update: to be determined by physician    PATIENT STRENGTHS:  Patient Strengths:Strengths: Positive Support,Connection to output provider,Medication Compliance  Patient Strengths and Limitations:Limitations: Difficult relationships / poor social skills,Difficulty problem solving/relies on others to help solve problems,Lacks leisure interests,Multiple barriers to leisure interests  Addictive Behavior:Addictive Behavior  In the past 3 months, have you felt or has someone told you that you have a problem with:  : None  Do you have a history of Chemical Use?: No  Do you have a history of Alcohol Use?: No  Do you have a history of Street Drug Abuse?: No  Histroy of Prescripton Drug Abuse?: No  Medical Problems:   Past Medical History:   Diagnosis Date    Anxiety     Bipolar 1 disorder ([facility]     [facility], mixed, severe[facility] 12/31/2021    Gastric ulcer     Opiate abuse, continuous ([facility]     Polycystic ovaries        Risk:  Fall RiskTotal: 65  Raciel Scale Raciel Scale Score: 22  BVC    Change in scores NO.  Changes to plan of Care  NO    Status EXAM:   Status and Exam  Normal: No  Facial Expression: Flat  Affect: Appropriate  Level of Consciousness: Alert  Mood:Normal: No  Mood: Depressed,Anxious  Motor Activity:Normal: No  Motor Activity: Decreased  Interview Behavior: Cooperative  Preception: [place] to Person,[place] to Time,[place] to Place,[place] to Situation  Attention:Normal: No  Attention: Distractible  Thought Processes: Blocking  Thought Content:Normal: No  Thought Content: Preoccupations  Hallucinations: None  Delusions: No  Delusions:  (paranoid)  Memory:Normal: No  Memory: Poor Recent,Poor Remote  Insight and Judgment: No  Insight and Judgment: Poor Judgment,Poor Insight  Present Suicidal Ideation: No  Present Homicidal Ideation: No    Daily Assessment Last Entry:   Daily Sleep (WDL): Within Defined Limits         Patient Currently in Pain: Denies  Daily Nutrition (WDL): Within Defined Limits    Patient Monitoring:  Frequency of Checks: 4 times per hour, close    Psychiatric Symptoms:   Depression Symptoms  Depression Symptoms: Isolative,Loss of interest  Anxiety Symptoms  Anxiety Symptoms: Generalized  Bambi Symptoms  Bambi Symptoms: No problems reported or observed. Psychosis Symptoms  Delusion Type: No problems reported or observed.     Suicide Risk CSSR-S:  1) Within the past month, have you wished you were dead or wished you could go to sleep and not wake up? : No  2) Have you actually had any thoughts of killing yourself? : No  6) Have you ever done anything, started to do anything, or prepared to do anything to end your life?: No  Change in Result NO  Change in Plan of careNO    EDUCATION:     Outcome: Refused Education    PATIENT GOALS: Patient refused treatment team at this time     PLAN/TREATMENT RECOMMENDATIONS UPDATE:   [facility], GOAL [address] [facility].:  Time frame for Short-Term Goals: 1-2 WEEKS     LONG-TERM GOALS UPDATE:  Time frame for Long-Term Goals: 6 MONTHS  Members Present in Team Meeting: See Signature Sheet    Chirag Foster

## 2022-01-07 NOTE — PROGRESS NOTES
Behavioral Services                                              Medicare Re-Certification    I certify that the inpatient psychiatric hospital services furnished since the previous certification/re-certification were, and continue to be, medically necessary for;    [x] (1) Treatment which could reasonably be expected to improve the patient's condition,    [x] (2) Or for diagnostic study. Estimated length of stay/service 3 to 5 days    Plan for post-hospital care Home with outpatient community mental health follow-up    This patient continues to need, on a daily basis, active treatment furnished directly by or requiring the supervision of inpatient psychiatric personnel.     Electronically signed by Miriam Ayon MD on 1/6/2022 at 9:04 PM
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
## 2022-01-07 NOTE — GROUP NOTE
Group Therapy Note    Date: 1/6/2022    Group Start Time: 2030  Group End Time: 2055  Group Topic: Wrap-Up    [facility]    Crystal Gomez RN       Group Therapy Note:     Pt refused to attend Wrap-up/Goal Review group this evening after encouragement from staff. 1:1 talk time provided as alternative to group session. Will continue to encourage patient to attend unit group programming.         Signature:  Paty Jones RN

## 2022-01-07 NOTE — PLAN OF CARE
Problem: Altered Mood, Psychotic Behavior:  Goal: Able to verbalize decrease in frequency and intensity of hallucinations  Description: Able to verbalize decrease in frequency and intensity of hallucinations  1/6/2022 2045 by Jarad Arreguin  Outcome: Ongoing     Problem: Altered Mood, Psychotic Behavior:  Goal: Absence of self-harm  Description: Absence of self-harm  1/6/2022 2045 by Jarad Arreguin  Outcome: Ongoing  Patient denies suicidal ideations this evening. Patient admits to depression and anxiety. Patient has been out in dayroom social with peers and has been calm, cooperative. Patient offer medication and snack. Q 15 minutes safety checks maintained.
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
## 2022-01-08 PROCEDURE — APPSS30 APP SPLIT SHARED TIME 16-30 MINUTES: Performed by: NURSE PRACTITIONER

## 2022-01-08 PROCEDURE — 6370000000 HC RX 637 (ALT 250 FOR IP): Performed by: PSYCHIATRY & NEUROLOGY

## 2022-01-08 PROCEDURE — 1240000000 HC EMOTIONAL WELLNESS R&B

## 2022-01-08 PROCEDURE — 6370000000 HC RX 637 (ALT 250 FOR IP): Performed by: INTERNAL MEDICINE

## 2022-01-08 PROCEDURE — 99232 SBSQ HOSP IP/OBS MODERATE 35: CPT | Performed by: PSYCHIATRY & NEUROLOGY

## 2022-01-08 RX ADMIN — PANTOPRAZOLE SODIUM 40 MG: 40 TABLET, DELAYED RELEASE ORAL at 06:52

## 2022-01-08 RX ADMIN — OXYBUTYNIN CHLORIDE 5 MG: 5 TABLET, EXTENDED RELEASE ORAL at 20:32

## 2022-01-08 RX ADMIN — NAPROXEN 250 MG: 250 TABLET ORAL at 19:35

## 2022-01-08 RX ADMIN — PALIPERIDONE 6 MG: 6 TABLET, EXTENDED RELEASE ORAL at 08:28

## 2022-01-08 RX ADMIN — Medication 5000 UNITS: at 08:27

## 2022-01-08 RX ADMIN — DIVALPROEX SODIUM 500 MG: 500 TABLET, DELAYED RELEASE ORAL at 13:52

## 2022-01-08 RX ADMIN — OXYBUTYNIN CHLORIDE 5 MG: 5 TABLET, EXTENDED RELEASE ORAL at 08:28

## 2022-01-08 RX ADMIN — LITHIUM CARBONATE 450 MG: 300 CAPSULE, GELATIN COATED ORAL at 16:49

## 2022-01-08 RX ADMIN — LEVOTHYROXINE SODIUM 50 MCG: 0.05 TABLET ORAL at 06:52

## 2022-01-08 RX ADMIN — DIVALPROEX SODIUM 500 MG: 500 TABLET, DELAYED RELEASE ORAL at 06:51

## 2022-01-08 RX ADMIN — TRAZODONE HYDROCHLORIDE 50 MG: 50 TABLET ORAL at 20:33

## 2022-01-08 RX ADMIN — MULTIPLE VITAMINS W/ MINERALS TAB 1 TABLET: TAB at 08:27

## 2022-01-08 RX ADMIN — LITHIUM CARBONATE 450 MG: 300 CAPSULE, GELATIN COATED ORAL at 08:27

## 2022-01-08 RX ADMIN — HYDROXYZINE HYDROCHLORIDE 50 MG: 50 TABLET, FILM COATED ORAL at 20:33

## 2022-01-08 ASSESSMENT — PAIN SCALES - GENERAL
PAINLEVEL_OUTOF10: 8
PAINLEVEL_OUTOF10: 0

## 2022-01-08 ASSESSMENT — PAIN DESCRIPTION - DESCRIPTORS: DESCRIPTORS: ACHING;THROBBING

## 2022-01-08 ASSESSMENT — PAIN - FUNCTIONAL ASSESSMENT: PAIN_FUNCTIONAL_ASSESSMENT: ACTIVITIES ARE NOT PREVENTED

## 2022-01-08 ASSESSMENT — PAIN DESCRIPTION - ONSET: ONSET: ON-GOING

## 2022-01-08 ASSESSMENT — PAIN DESCRIPTION - FREQUENCY: FREQUENCY: INTERMITTENT

## 2022-01-08 ASSESSMENT — PAIN DESCRIPTION - ORIENTATION: ORIENTATION: LEFT;LOWER

## 2022-01-08 ASSESSMENT — PAIN DESCRIPTION - LOCATION: LOCATION: TEETH

## 2022-01-08 NOTE — PLAN OF CARE
Problem: Altered Mood, Psychotic Behavior:  Goal: Able to verbalize decrease in frequency and intensity of hallucinations  Description: Able to verbalize decrease in frequency and intensity of hallucinations  Outcome: Ongoing  Note: Patient denies hallucinations at this time, but states she had them earlier today. Patient is compliant with medications. She endorses depression and anxiety symptoms. Patient denies thoughts of harm to self or others. Visual safety checks are completed every 15 minutes. Goal: Absence of self-harm  Description: Absence of self-harm  Outcome: Ongoing     Problem: Tobacco Use:  Goal: Inpatient tobacco use cessation counseling participation  Description: Inpatient tobacco use cessation counseling participation  Outcome: Ongoing  Note: Patient declines counseling at this time. Problem: Pain:  Goal: Pain level will decrease  Description: Pain level will decrease  Outcome: Ongoing  Goal: Control of acute pain  Description: Control of acute pain  Outcome: Ongoing  Goal: Control of chronic pain  Description: Control of chronic pain  Outcome: Ongoing     Problem: Airway Clearance - Ineffective  Goal: Achieve or maintain patent airway  Outcome: Ongoing     Problem: Gas Exchange - Impaired  Goal: Absence of hypoxia  Outcome: Ongoing  Note: Oxygenation is WNL. Goal: Promote optimal lung function  Outcome: Ongoing     Problem: Breathing Pattern - Ineffective  Goal: Ability to achieve and maintain a regular respiratory rate  Outcome: Ongoing     Problem: Body Temperature -  Risk of, Imbalanced  Goal: Ability to maintain a body temperature within defined limits  Outcome: Ongoing  Note: Body temperature is WNL.    Goal: Will regain or maintain usual level of consciousness  Outcome: Ongoing  Goal: Complications related to the disease process, condition or treatment will be avoided or minimized  Outcome: Ongoing     Problem: Isolation Precautions - Risk of Spread of Infection  Goal: Prevent

## 2022-01-08 NOTE — PROGRESS NOTES
Daily Progress Note  1/8/2022    Patient Name: Stephen Root COMPLAINT:  Suicidal ideation due to auditory hallucinations that are command in nature          SUBJECTIVE:    Patient is seen today for a follow up assessment. Nursing staff report patient has been medication compliant and behaviorally in control. She was resting in bed but awake on approach. She was calm and cooperative but continues to present as very depressed. She stated she was \"not so good\" today. Patient is denying any improvement in symptoms of depression today. She continues to feel helpless and hopeless. She endorses ongoing thoughts of self-harm but denies having any plan or intent. She has not noticed any improvement in auditory hallucinations. She expresses ongoing paranoia that she will be harmed by someone. Patient has been medication adherent with Depakote since last night. She took her lithium medication this morning after 24-hour refusal. She is hopeful that with taking her medications regularly her mood will start to improve. She reports that sleep last night was \"so-so\". She feels that has improved somewhat. She continues to experience dry cough and fatigue from Covid infection. She reports attending goal group today and is trying to spend more time in the day area. She continues to contract for safety on the unit however at this time she is unable to contract for safety in the community. Appetite:  [] Normal/Adequate/Unchanged  [] Increased  [x] Decreased      Sleep:       [] Normal/Adequate/Unchanged  [x] Fair  [] Poor      Group Attendance on Unit:   [] Yes  [x] Selectively    [] No    Medication Side Effects: Denies today         Mental Status Exam  Level of consciousness: Alert and awake. Appearance: Appropriate attire for setting, resting in bed, with poor grooming and hygiene. Behavior/Motor: Approachable, psychomotor slowing  Attitude toward examiner: Cooperative, attentive, fair eye contact.   Speech: Normal rate, quiet volume, depressed tone. Mood: Depressed, \"not good\"   Affect: Blunted  Thought processes: Linear and coherent. Thought content: Denies homicidal ideation. Suicidal Ideation: Active suicidal ideations, without current plan or intent, contracts for safety on the unit. Delusions: No evidence of delusions. Endorses paranoia   Perceptual Disturbance: Patient does not appear to be responding to internal stimuli. Endorses auditory hallucinations. Denies visual hallucinations. Cognition: Oriented to self, location, time, and situation. Memory: Intact. Insight & Judgement: Poor. Data   height is 5' 4\" (1.626 m) and weight is 144 lb (65.3 kg). Her oral temperature is 97.8 °F (36.6 °C). Her blood pressure is 91/53 (abnormal) and her pulse is 83. Her respiration is 14 and oxygen saturation is 98%. Labs:   Admission on 12/31/2021   Component Date Value Ref Range Status    Glucose 01/03/2022 85  70 - 99 mg/dL Final    BUN 01/03/2022 11  6 - 20 mg/dL Final    CREATININE 01/03/2022 0.67  0.50 - 0.90 mg/dL Final    Bun/Cre Ratio 01/03/2022 NOT REPORTED  9 - 20 Final    Calcium 01/03/2022 8.8  8.6 - 10.4 mg/dL Final    Sodium 01/03/2022 139  135 - 144 mmol/L Final    Potassium 01/03/2022 4.6  3.7 - 5.3 mmol/L Final    Chloride 01/03/2022 105  98 - 107 mmol/L Final    CO2 01/03/2022 28  20 - 31 mmol/L Final    Anion Gap 01/03/2022 6* 9 - 17 mmol/L Final    GFR Non- 01/03/2022 >60  >60 mL/min Final    GFR  01/03/2022 >60  >60 mL/min Final    GFR Comment 01/03/2022        Final    Comment: Average GFR for 30-36 years old:   80 mL/min/1.73sq m  Chronic Kidney Disease:   <60 mL/min/1.73sq m  Kidney failure:   <15 mL/min/1.73sq m              eGFR calculated using average adult body mass.  Additional eGFR calculator available at:        Zoopla.br            GFR Staging 01/03/2022 NOT REPORTED   Final    TSH 01/03/2022 1. 58  0.30 - 5.00 mIU/L Final    Albumin 01/03/2022 3.3* 3.5 - 5.2 g/dL Final    Alkaline Phosphatase 01/03/2022 56  35 - 104 U/L Final    ALT 01/03/2022 9  5 - 33 U/L Final    AST 01/03/2022 11  <32 U/L Final    Total Bilirubin 01/03/2022 <0.15* 0.3 - 1.2 mg/dL Final    Bilirubin, Direct 01/03/2022 <0.08  <0.31 mg/dL Final    Bilirubin, Indirect 01/03/2022 Can not be calculated  0.00 - 1.00 mg/dL Final    Total Protein 01/03/2022 5.5* 6.4 - 8.3 g/dL Final    Globulin 01/03/2022 NOT REPORTED  1.5 - 3.8 g/dL Final    Albumin/Globulin Ratio 01/03/2022 NOT REPORTED  1.0 - 2.5 Final    Specimen Description 01/05/2022 . NASOPHARYNGEAL SWAB   Final    SARS-CoV-2, Rapid 01/05/2022 DETECTED* Not Detected Final    Comment:       Rapid NAAT: The specimen is POSITIVE for SARS-Cov-2, the novel coronavirus associated with   COVID-19. This test has been authorized by the FDA under an Emergency Use Authorization (EUA) for use   by authorized laboratories. The ID NOW COVID-19 assay is designed to detect the virus that causes COVID-19 in patients   with signs and symptoms of infection who are suspected of COVID-19. An individual without symptoms of COVID-19 and who is not shedding SARS-CoV-2 virus would   expect to have a negative (not detected) result in this assay. Fact sheet for Healthcare Providers: BuildHer.es  Fact sheet for Patients: BuildHer.es          Methodology: Isothermal Nucleic Acid Amplification        Results reported to the appropriate Health Department           Reviewed patient's current plan of care and vital signs with nursing staff.     Labs reviewed: [x] Yes      Medications  Current Facility-Administered Medications: lithium capsule 450 mg, 450 mg, Oral, BID WC  pantoprazole (PROTONIX) tablet 40 mg, 40 mg, Oral, QAM AC  therapeutic multivitamin-minerals 1 tablet, 1 tablet, Oral, Daily  vitamin D3 (CHOLECALCIFEROL) tablet 5,000 Units, 5,000 Units, Oral, Daily  oxybutynin (DITROPAN-XL) extended release tablet 5 mg, 5 mg, Oral, BID  naproxen (NAPROSYN) tablet 250 mg, 250 mg, Oral, 4x Daily PRN  carboxymethylcellulose (REFRESH PLUS) 0.5 % ophthalmic solution 1 drop, 1 drop, Both Eyes, BID PRN  divalproex (DEPAKOTE) DR tablet 500 mg, 500 mg, Oral, 3 times per day  levothyroxine (SYNTHROID) tablet 50 mcg, 50 mcg, Oral, Daily  aspirin-acetaminophen-caffeine (EXCEDRIN MIGRAINE) per tablet 1 tablet, 1 tablet, Oral, Q6H PRN  paliperidone (INVEGA) extended release tablet 6 mg, 6 mg, Oral, Daily  acetaminophen (TYLENOL) tablet 650 mg, 650 mg, Oral, Q4H PRN  aluminum & magnesium hydroxide-simethicone (MAALOX) 200-200-20 MG/5ML suspension 30 mL, 30 mL, Oral, Q6H PRN  hydrOXYzine (ATARAX) tablet 50 mg, 50 mg, Oral, TID PRN  nicotine (NICODERM CQ) 14 MG/24HR 1 patch, 1 patch, TransDERmal, Daily  polyethylene glycol (GLYCOLAX) packet 17 g, 17 g, Oral, Daily PRN  traZODone (DESYREL) tablet 50 mg, 50 mg, Oral, Nightly PRN  haloperidol (HALDOL) tablet 5 mg, 5 mg, Oral, Q4H PRN **AND** LORazepam (ATIVAN) tablet 2 mg, 2 mg, Oral, Q4H PRN  haloperidol lactate (HALDOL) injection 5 mg, 5 mg, IntraMUSCular, Q4H PRN **AND** LORazepam (ATIVAN) injection 2 mg, 2 mg, IntraMUSCular, Q4H PRN **AND** diphenhydrAMINE (BENADRYL) injection 50 mg, 50 mg, IntraMUSCular, Q4H PRN    ASSESSMENT  Bipolar affective disorder, mixed, severe, with psychotic behavior ([facility] [address]         PLAN  Patient symptoms are: Remains Unstable. Continue current medication regimen and encourage compliance   No lithium level on file; will order for tomorrow  Invega Sustenna 234 mg IM received 1/5/2022  Monitor need and frequency of PRN medications. Encourage participation in groups and milieu. Attempt to develop insight. Psycho-education conducted. Supportive Therapy conducted. Probable discharge is to be determined by MD.    Follow-up daily while inpatient.      Patient continues to be monitored in the inpatient psychiatric facility at [facility] for safety and stabilization. Patient continues to need, on a daily basis, active treatment furnished directly by or requiring the supervision of inpatient psychiatric personnel. Electronically signed by SILVANO Smith CNP on 1/8/2022 at 3:42 PM    **This report has been created using voice recognition software. It may contain minor errors which are inherent in voice recognition technology. **    I independently saw and evaluated the patient. I reviewed the nurse practitioners documentation above. Any additional comments or changes to the nurse practitioners documentation are stated below otherwise agree with assessment. Plan will be as follows:  Patient's course has been difficult to ascertain with medication adjustments because of her refusal to take Depakote and lithium for nearly 48-hour period. Now patient is taking the medication more consistently. We will observe though she is still suicidal and not able to contract for safety  PLAN  Patient s symptoms   show no change  Encourage oral compliance with medication and observe  Attempt to develop insight  Psycho-education conducted. Supportive Therapy conducted.   Probable discharge is undetermined at this time  Follow-up daily while on inpatient unit

## 2022-01-09 PROCEDURE — 80178 ASSAY OF LITHIUM: CPT

## 2022-01-09 PROCEDURE — APPSS30 APP SPLIT SHARED TIME 16-30 MINUTES

## 2022-01-09 PROCEDURE — 36415 COLL VENOUS BLD VENIPUNCTURE: CPT

## 2022-01-09 PROCEDURE — 6370000000 HC RX 637 (ALT 250 FOR IP): Performed by: PSYCHIATRY & NEUROLOGY

## 2022-01-09 PROCEDURE — 6370000000 HC RX 637 (ALT 250 FOR IP)

## 2022-01-09 PROCEDURE — 99232 SBSQ HOSP IP/OBS MODERATE 35: CPT | Performed by: PSYCHIATRY & NEUROLOGY

## 2022-01-09 PROCEDURE — 1240000000 HC EMOTIONAL WELLNESS R&B

## 2022-01-09 PROCEDURE — 6370000000 HC RX 637 (ALT 250 FOR IP): Performed by: INTERNAL MEDICINE

## 2022-01-09 RX ORDER — DIVALPROEX SODIUM 250 MG/1
250 TABLET, DELAYED RELEASE ORAL 2 TIMES DAILY
Status: DISCONTINUED | OUTPATIENT
Start: 2022-01-09 | End: 2022-01-10 | Stop reason: HOSPADM

## 2022-01-09 RX ADMIN — LITHIUM CARBONATE 450 MG: 300 CAPSULE, GELATIN COATED ORAL at 16:50

## 2022-01-09 RX ADMIN — OXYBUTYNIN CHLORIDE 5 MG: 5 TABLET, EXTENDED RELEASE ORAL at 07:56

## 2022-01-09 RX ADMIN — LEVOTHYROXINE SODIUM 50 MCG: 0.05 TABLET ORAL at 06:43

## 2022-01-09 RX ADMIN — HYDROXYZINE HYDROCHLORIDE 50 MG: 50 TABLET, FILM COATED ORAL at 20:25

## 2022-01-09 RX ADMIN — Medication 5000 UNITS: at 07:56

## 2022-01-09 RX ADMIN — LITHIUM CARBONATE 450 MG: 300 CAPSULE, GELATIN COATED ORAL at 09:34

## 2022-01-09 RX ADMIN — OXYBUTYNIN CHLORIDE 5 MG: 5 TABLET, EXTENDED RELEASE ORAL at 20:24

## 2022-01-09 RX ADMIN — MULTIPLE VITAMINS W/ MINERALS TAB 1 TABLET: TAB at 07:56

## 2022-01-09 RX ADMIN — DIVALPROEX SODIUM 250 MG: 250 TABLET, DELAYED RELEASE ORAL at 20:25

## 2022-01-09 RX ADMIN — PANTOPRAZOLE SODIUM 40 MG: 40 TABLET, DELAYED RELEASE ORAL at 06:43

## 2022-01-09 RX ADMIN — TRAZODONE HYDROCHLORIDE 50 MG: 50 TABLET ORAL at 20:25

## 2022-01-09 RX ADMIN — PALIPERIDONE 6 MG: 6 TABLET, EXTENDED RELEASE ORAL at 07:57

## 2022-01-09 NOTE — PLAN OF CARE
Problem: Altered Mood, Psychotic Behavior:  Goal: Able to verbalize decrease in frequency and intensity of hallucinations  Description: Able to verbalize decrease in frequency and intensity of hallucinations  Outcome: Ongoing  Note: Pt denies any hallucinations at this time. Problem: Altered Mood, Psychotic Behavior:  Goal: Absence of self-harm  Description: Absence of self-harm  Outcome: Ongoing  Note: Pt remains free of any self-harm. Safe environment maintained. Every 15 minute checks for safety continued per unit policy. Will continue to monitor for safety and provide support and reassurance as needed. Problem: Tobacco Use:  Goal: Inpatient tobacco use cessation counseling participation  Description: Inpatient tobacco use cessation counseling participation  Outcome: Ongoing  Note: Patient given information on the dangers of long term tobacco use. Will continue to reinforce the dangers of tobacco use. Problem: Pain:  Goal: Pain level will decrease  Description: Pain level will decrease  Outcome: Ongoing  Note: . Pt complains of generalized pain rates at 8/10. Staff educated pt of non pharmacological pain intervention such as distraction, relaxation, and rest, but no relief. Pt requested PRN pain medication,  states pain medication helps decrease pain level. Problem: Gas Exchange - Impaired  Goal: Absence of hypoxia  Outcome: Ongoing  Note: No s/sx of hypoxia noted this shift. Pt's oxygen saturation within normal limits at 98% in room air. Problem: Body Temperature -  Risk of, Imbalanced  Goal: Ability to maintain a body temperature within defined limits  Outcome: Ongoing  Note: Pt remains afebrile at 97.8 F.       Problem: Isolation Precautions - Risk of Spread of Infection  Goal: Prevent transmission of infection  Outcome: Ongoing  Note: Educated pt about the importance of proper hand hygiene, physical distancing and wearing face mask when out of room to prevent spread of infection.

## 2022-01-09 NOTE — PROGRESS NOTES
Daily Progress Note  1/9/2022    Patient Name: Roman Pack    CHIEF COMPLAINT: Suicidal ideation due to auditory hallucinations that are command in nature         SUBJECTIVE:      Patient is seen today for a follow up assessment. Patient is compliant with scheduled medications except her Depakote 500 mg 3 times daily. Patient has not required emergency medications in the past 24 hours. Patient is agreeable to assessment in her room today where she is lying in bed. This writer asked why patient was not compliant with her Depakote and patient states \"prior to coming to the hospital I was only getting 250 mg twice a day and it has been increased and I think it has been causing my legs to cramp up because when I stopped taking it the cramping stopped. \"  We discussed risks benefits and alternatives of decreasing Depakote back to 250 mg twice a day and mutually agreed upon doing this. Patient reports improvement in symptoms of depression and anxiety today. She reports that she continues to sleep poorly however is not interested in increasing any of her medications to help with this. She reports that her appetite is adequate. Radha Sagastume reports improvement in suicidal ideation without current plan or intent. She denies homicidal ideation. She is able to contract for safety on this unit with this writer. She reports improvement in her auditory hallucinations and paranoia. She denies visual hallucinations. She denies medication side effects or medical concerns at this time. She reports that she is slightly feeling better after being diagnosed with COVID-19. Patient originally wanted to go to [place] upon her discharge however today she states \"I think I just want to go home back to [place]. \"  Patient reports that she has a safe place to go in [place]. Writer encouraged patient to attend groups on the unit. At this time, the patient is not appropriate for a lower level of care.  There is risk of decompensation and patient warrants further hospitalization for safety and stabilization. Appetite:  [x] Normal/Adequate/Unchanged  [] Increased  [] Decreased      Sleep:       [] Normal/Adequate/Unchanged  [x] Fair  [] Poor      Group Attendance on Unit:   [] Yes  [x] Selectively    [] No    Medication Side Effects:  Patient denies any medication side effects at the time of assessment. Mental Status Exam  Level of consciousness: Alert and awake. Appearance: Appropriate attire for setting, resting in bed, with fair  grooming and hygiene. Behavior/Motor: Approachable, slight psychomotor slowing  Attitude toward examiner: Cooperative, attentive, fair eye contact. Speech: Normal rate, normal volume, normal tone. Mood:  Patient reports \"good\". Affect: Flat, depressed  Thought processes: Linear and coherent. Thought content: Denies homicidal ideation. Suicidal Ideation: Reports improvement in suicidal ideations, without current plan or intent, contracts for safety on the unit. Delusions: No evidence of delusions. Reports improvement in paranoia. Perceptual Disturbance: Patient does not appear to be responding to internal stimuli. Reports improvement in auditory hallucinations. Denies visual hallucinations. Cognition: Oriented to self, location, time, and situation. Memory: Intact. Insight & Judgement: Poor. Data   height is 5' 4\" (1.626 m) and weight is 144 lb (65.3 kg). Her oral temperature is 97.8 °F (36.6 °C). Her blood pressure is 88/48 (abnormal) and her pulse is 75. Her respiration is 14 and oxygen saturation is 99%.    Labs:   Admission on 12/31/2021   Component Date Value Ref Range Status    Glucose 01/03/2022 85  70 - 99 mg/dL Final    BUN 01/03/2022 11  6 - 20 mg/dL Final    CREATININE 01/03/2022 0.67  0.50 - 0.90 mg/dL Final    Bun/Cre Ratio 01/03/2022 NOT REPORTED  9 - 20 Final    Calcium 01/03/2022 8.8  8.6 - 10.4 mg/dL Final    Sodium 01/03/2022 139  135 - 144 mmol/L Final    Potassium 01/03/2022 4.6  3.7 - 5.3 mmol/L Final    Chloride 01/03/2022 105  98 - 107 mmol/L Final    CO2 01/03/2022 28  20 - 31 mmol/L Final    Anion Gap 01/03/2022 6* 9 - 17 mmol/L Final    GFR Non- 01/03/2022 >60  >60 mL/min Final    GFR  01/03/2022 >60  >60 mL/min Final    GFR Comment 01/03/2022        Final    Comment: Average GFR for 30-36 years old:   80 mL/min/1.73sq m  Chronic Kidney Disease:   <60 mL/min/1.73sq m  Kidney failure:   <15 mL/min/1.73sq m              eGFR calculated using average adult body mass. Additional eGFR calculator available at:        ESCAPESwithYOU.br            GFR Staging 01/03/2022 NOT REPORTED   Final    TSH 01/03/2022 1.58  0.30 - 5.00 mIU/L Final    Albumin 01/03/2022 3.3* 3.5 - 5.2 g/dL Final    Alkaline Phosphatase 01/03/2022 56  35 - 104 U/L Final    ALT 01/03/2022 9  5 - 33 U/L Final    AST 01/03/2022 11  <32 U/L Final    Total Bilirubin 01/03/2022 <0.15* 0.3 - 1.2 mg/dL Final    Bilirubin, Direct 01/03/2022 <0.08  <0.31 mg/dL Final    Bilirubin, Indirect 01/03/2022 Can not be calculated  0.00 - 1.00 mg/dL Final    Total Protein 01/03/2022 5.5* 6.4 - 8.3 g/dL Final    Globulin 01/03/2022 NOT REPORTED  1.5 - 3.8 g/dL Final    Albumin/Globulin Ratio 01/03/2022 NOT REPORTED  1.0 - 2.5 Final    Specimen Description 01/05/2022 . NASOPHARYNGEAL SWAB   Final    SARS-CoV-2, Rapid 01/05/2022 DETECTED* Not Detected Final    Comment:       Rapid NAAT: The specimen is POSITIVE for SARS-Cov-2, the novel coronavirus associated with   COVID-19. This test has been authorized by the FDA under an Emergency Use Authorization (EUA) for use   by authorized laboratories. The ID NOW COVID-19 assay is designed to detect the virus that causes COVID-19 in patients   with signs and symptoms of infection who are suspected of COVID-19.   An individual without symptoms of COVID-19 and who is not shedding SARS-CoV-2 virus would   expect to have a negative (not detected) result in this assay. Fact sheet for Healthcare Providers: Jada.zechariah  Fact sheet for Patients: Jada.zechariah          Methodology: Isothermal Nucleic Acid Amplification        Results reported to the appropriate Health Department           Reviewed patient's current plan of care and vital signs with nursing staff.     Labs reviewed: [x] Yes  Last EKG in EMR reviewed: [x] Yes  QTc: 391    Medications  Current Facility-Administered Medications: divalproex (DEPAKOTE) DR tablet 250 mg, 250 mg, Oral, BID  lithium capsule 450 mg, 450 mg, Oral, BID WC  pantoprazole (PROTONIX) tablet 40 mg, 40 mg, Oral, QAM AC  therapeutic multivitamin-minerals 1 tablet, 1 tablet, Oral, Daily  vitamin D3 (CHOLECALCIFEROL) tablet 5,000 Units, 5,000 Units, Oral, Daily  oxybutynin (DITROPAN-XL) extended release tablet 5 mg, 5 mg, Oral, BID  naproxen (NAPROSYN) tablet 250 mg, 250 mg, Oral, 4x Daily PRN  carboxymethylcellulose (REFRESH PLUS) 0.5 % ophthalmic solution 1 drop, 1 drop, Both Eyes, BID PRN  levothyroxine (SYNTHROID) tablet 50 mcg, 50 mcg, Oral, Daily  aspirin-acetaminophen-caffeine (EXCEDRIN MIGRAINE) per tablet 1 tablet, 1 tablet, Oral, Q6H PRN  paliperidone (INVEGA) extended release tablet 6 mg, 6 mg, Oral, Daily  acetaminophen (TYLENOL) tablet 650 mg, 650 mg, Oral, Q4H PRN  aluminum & magnesium hydroxide-simethicone (MAALOX) 200-200-20 MG/5ML suspension 30 mL, 30 mL, Oral, Q6H PRN  hydrOXYzine (ATARAX) tablet 50 mg, 50 mg, Oral, TID PRN  nicotine (NICODERM CQ) 14 MG/24HR 1 patch, 1 patch, TransDERmal, Daily  polyethylene glycol (GLYCOLAX) packet 17 g, 17 g, Oral, Daily PRN  traZODone (DESYREL) tablet 50 mg, 50 mg, Oral, Nightly PRN  haloperidol (HALDOL) tablet 5 mg, 5 mg, Oral, Q4H PRN **AND** LORazepam (ATIVAN) tablet 2 mg, 2 mg, Oral, Q4H PRN  haloperidol lactate (HALDOL) injection 5 mg, 5 mg, IntraMUSCular, Q4H PRN **AND** LORazepam (ATIVAN) injection 2 mg, 2 mg, IntraMUSCular, Q4H PRN **AND** diphenhydrAMINE (BENADRYL) injection 50 mg, 50 mg, IntraMUSCular, Q4H PRN    ASSESSMENT  Bipolar affective disorder, mixed, severe, with psychotic behavior ([facility] [address]         PLAN  Patient symptoms are: Modestly Improving. Continue current medication regimen. Decrease Depakote to 250 mg twice daily  Monitor need and frequency of PRN medications. Encourage participation in groups and milieu. Attempt to develop insight. Psycho-education conducted. Supportive Therapy conducted. Probable discharge is to be determined by MD.   Follow-up daily while inpatient. Patient continues to be monitored in the inpatient psychiatric facility at [facility] for safety and stabilization. Patient continues to need, on a daily basis, active treatment furnished directly by or requiring the supervision of inpatient psychiatric personnel. Electronically signed by SILVANO Levin CNP on 1/9/2022 at 3:43 PM    **This report has been created using voice recognition software. It may contain minor errors which are inherent in voice recognition technology. **    I independently saw and evaluated the patient. I reviewed the nurse practitioners documentation above. Any additional comments or changes to the nurse practitioners documentation are stated below otherwise agree with assessment. Plan will be as follows:  Patient reports improvement in her mood. Denying suicidal ideation. States that she recalls that she was on a lower dose of Depakote and request to go down to 250 mg p.o. twice daily. Agreeable  PLAN  Patient s symptoms   are improving  Decrease Depakote to 250 mg p.o. twice daily  Attempt to develop insight  Psycho-education conducted. Supportive Therapy conducted.   Probable discharge is tomorrow if stable or improved  Follow-up daily while on inpatient unit

## 2022-01-09 NOTE — GROUP NOTE
Group Therapy Note    Date: 1/9/2022    Group Start Time: 0900  Group End Time: 0915  Group Topic: Community Meeting    SUPRIYA Smith [address] [facility], LPN  Patient refused to attend 9 am community meeting group due to resting in bed, refused 1;1 talk time         Signature:  Joseph Rodrigues LPN

## 2022-01-09 NOTE — GROUP NOTE
Group Therapy Note    Date: 1/8/2022    Group Start Time: 1930  Group End Time: 1955  Group Topic: Wrap-Up    [facility] Coleen Gallegos RN      Group Therapy Note    Attendees: 7/13       Patient's Goal:    1. To be able to reflect on daily unit activities/experiences. 2.  To review accomplished daily goals and be encouraged to set new goals for the next day. 3.  To improve interpersonal interaction through socialization. Notes:  Pt attended and actively participated in Wrap-Up/Goal Review group this evening. Status After Intervention:  Improved     Participation Level:  Active Listener and Interactive     Participation Quality: Appropriate, Attentive and Sharing     Speech:  normal     Thought Process/Content: Logical     Affective Functioning: Congruent     Mood: euthymic     Level of consciousness:  Alert, Oriented x4 and Attentive     Response to Learning: Able to verbalize current knowledge/experience, Able to verbalize/acknowledge new learning, Progressing to goal     Endings: None Reported     Modes of Intervention: Education, Support and Socialization     Discipline Responsible: Registered Nurse        Signature:  Paty Jones RN

## 2022-01-10 VITALS
WEIGHT: 144 LBS | RESPIRATION RATE: 14 BRPM | BODY MASS INDEX: 24.59 KG/M2 | SYSTOLIC BLOOD PRESSURE: 98 MMHG | DIASTOLIC BLOOD PRESSURE: 64 MMHG | TEMPERATURE: 98 F | HEART RATE: 89 BPM | OXYGEN SATURATION: 99 % | HEIGHT: 64 IN

## 2022-01-10 LAB
LITHIUM DATE LAST DOSE: NORMAL
LITHIUM DOSE AMOUNT: NORMAL
LITHIUM DOSE TIME: NORMAL
LITHIUM LEVEL: 1.2 MMOL/L (ref 0.6–1.2)

## 2022-01-10 PROCEDURE — 99239 HOSP IP/OBS DSCHRG MGMT >30: CPT | Performed by: PSYCHIATRY & NEUROLOGY

## 2022-01-10 PROCEDURE — 6370000000 HC RX 637 (ALT 250 FOR IP): Performed by: PSYCHIATRY & NEUROLOGY

## 2022-01-10 PROCEDURE — 6370000000 HC RX 637 (ALT 250 FOR IP): Performed by: INTERNAL MEDICINE

## 2022-01-10 PROCEDURE — 6370000000 HC RX 637 (ALT 250 FOR IP)

## 2022-01-10 RX ORDER — LITHIUM CARBONATE 150 MG/1
450 CAPSULE ORAL 2 TIMES DAILY WITH MEALS
Qty: 180 CAPSULE | Refills: 0 | Status: ON HOLD | OUTPATIENT
Start: 2022-01-10

## 2022-01-10 RX ORDER — HYDROXYZINE 50 MG/1
50 TABLET, FILM COATED ORAL 3 TIMES DAILY PRN
Qty: 90 TABLET | Refills: 0 | Status: SHIPPED | OUTPATIENT
Start: 2022-01-10 | End: 2022-02-09

## 2022-01-10 RX ORDER — DIVALPROEX SODIUM 250 MG/1
250 TABLET, DELAYED RELEASE ORAL 2 TIMES DAILY
Qty: 60 TABLET | Refills: 0 | Status: ON HOLD | OUTPATIENT
Start: 2022-01-10

## 2022-01-10 RX ORDER — PALIPERIDONE 6 MG/1
6 TABLET, EXTENDED RELEASE ORAL DAILY
Qty: 30 TABLET | Refills: 0 | Status: ON HOLD | OUTPATIENT
Start: 2022-01-11

## 2022-01-10 RX ORDER — LEVOTHYROXINE SODIUM 0.05 MG/1
50 TABLET ORAL DAILY
Qty: 30 TABLET | Refills: 0 | Status: ON HOLD | OUTPATIENT
Start: 2022-01-11

## 2022-01-10 RX ADMIN — PALIPERIDONE 6 MG: 6 TABLET, EXTENDED RELEASE ORAL at 07:37

## 2022-01-10 RX ADMIN — PANTOPRAZOLE SODIUM 40 MG: 40 TABLET, DELAYED RELEASE ORAL at 06:30

## 2022-01-10 RX ADMIN — Medication 5000 UNITS: at 07:37

## 2022-01-10 RX ADMIN — OXYBUTYNIN CHLORIDE 5 MG: 5 TABLET, EXTENDED RELEASE ORAL at 07:38

## 2022-01-10 RX ADMIN — DIVALPROEX SODIUM 250 MG: 250 TABLET, DELAYED RELEASE ORAL at 07:37

## 2022-01-10 RX ADMIN — MULTIPLE VITAMINS W/ MINERALS TAB 1 TABLET: TAB at 07:37

## 2022-01-10 RX ADMIN — LEVOTHYROXINE SODIUM 50 MCG: 0.05 TABLET ORAL at 06:30

## 2022-01-10 NOTE — PLAN OF CARE
Problem: Pain:  Goal: Pain level will decrease  Description: Pain level will decrease  Outcome: Ongoing  Note: Pt denies any pain at this time     Problem: Gas Exchange - Impaired  Goal: Absence of hypoxia  Outcome: Ongoing  Note: No s/sx of hypoxia noted this shift. Pt's oxygen saturation optimal this shift at 100% in room air. Problem: Body Temperature -  Risk of, Imbalanced  Goal: Ability to maintain a body temperature within defined limits  Outcome: Ongoing  Note: Pt remains afebrile at 98.5 F. Problem: Isolation Precautions - Risk of Spread of Infection  Goal: Prevent transmission of infection  Outcome: Ongoing  Note: Educated pt about the importance of proper hand hygiene, physical distancing and wearing face mask when out of room to prevent spread of infection.

## 2022-01-10 NOTE — BH NOTE
Patient given tobacco quitline number 52716599046 at this time, refusing to call at this time, states \" I just dont want to quit now\"- patient given information as to the dangers of long term tobacco use. Continue to reinforce the importance of tobacco cessation.

## 2022-01-10 NOTE — GROUP NOTE
Group Therapy Note    Date: 1/9/2022    Group Start Time: 1955  Group End Time: 2025  Group Topic: Wrap-Up    SUPRIYA Nuno, RN      Group Therapy Note    Attendees: 12/14       Patient's Goal:    1. To be able to reflect on daily unit activities/experiences. 2.  To review accomplished daily goals and be encouraged to set new goals for the next day. 3.  To improve interpersonal interaction through socialization. Notes:  Pt attended and actively participated in Wrap-Up/Goal Review group this evening. Status After Intervention:  Improved     Participation Level:  Active Listener and Interactive     Participation Quality: Appropriate, Attentive and Sharing     Speech:  normal     Thought Process/Content: Logical     Affective Functioning: Congruent     Mood: euthymic     Level of consciousness:  Alert, Oriented x4 and Attentive     Response to Learning: Able to verbalize current knowledge/experience, Able to verbalize/acknowledge new learning, Progressing to goal     Endings: None Reported     Modes of Intervention: Education, Support and Socialization     Discipline Responsible: Registered Nurse        Signature:  Jimmy Murillo RN

## 2022-01-10 NOTE — PLAN OF CARE
Problem: Altered Mood, Psychotic Behavior:  Goal: Able to verbalize decrease in frequency and intensity of hallucinations  Description: Able to verbalize decrease in frequency and intensity of hallucinations  Outcome: Ongoing  Note: Pt denies any hallucinations at this time. Problem: Altered Mood, Psychotic Behavior:  Goal: Absence of self-harm  Description: Absence of self-harm  Outcome: Ongoing  Note: Pt remains free of any self-harm. Safe environment maintained. Every 15 minute checks for safety continued per unit policy. Will continue to monitor for safety and provide support and reassurance as needed. Pt denies any suicidal or homicidal ideation. Problem: Tobacco Use:  Goal: Inpatient tobacco use cessation counseling participation  Description: Inpatient tobacco use cessation counseling participation  Outcome: Ongoing  Note: Patient given information on the dangers of long term tobacco use. Will continue to reinforce the dangers of tobacco use. Problem: Pain:  Goal: Pain level will decrease  Description: Pain level will decrease  Outcome: Ongoing  Note: Pt denies any pain at this time. Problem: Gas Exchange - Impaired  Goal: Absence of hypoxia  Outcome: Ongoing  Note: No s/sx of hypoxia noted this shift. Pt's oxygen saturation within normal limits at 99% in room air. Problem: Body Temperature -  Risk of, Imbalanced  Goal: Ability to maintain a body temperature within defined limits  Outcome: Ongoing  Note: Pt remains afebrile at 98 F. Problem: Isolation Precautions - Risk of Spread of Infection  Goal: Prevent transmission of infection  Outcome: Ongoing  Note: Educated pt about the importance of proper hand hygiene, physical distancing and wearing face mask when out of room to prevent spread of infection.

## 2022-01-10 NOTE — CARE COORDINATION
Name: Corey Ware    : 1985    Discharge Date: 1/10/2022    Primary Auth/Cert #: [id number]    Destination: home    Discharge Medications:      Medication List      START taking these medications    hydrOXYzine 50 MG tablet  Commonly known as: ATARAX  Take 1 tablet by mouth 3 times daily as needed for Anxiety  Notes to patient: Anxiety     paliperidone 6 MG extended release tablet  Commonly known as: INVEGA  Take 1 tablet by mouth daily  Start taking on: 2022  Notes to patient: Clear thoughts        CHANGE how you take these medications    divalproex 250 MG DR tablet  Commonly known as: DEPAKOTE  Take 1 tablet by mouth 2 times daily  What changed:   · how much to take  · how to take this  · when to take this  Notes to patient: Mood     lithium 150 MG capsule  Take 3 capsules by mouth 2 times daily (with meals)  What changed: medication strength  Notes to patient: Mood     omeprazole 20 MG delayed release capsule  Commonly known as: PRILOSEC  TAKE 2 CAPSULES BY MOUTH EVERY DAY  What changed: Another medication with the same name was removed. Continue taking this medication, and follow the directions you see here. Notes to patient: Acid Reflux        CONTINUE taking these medications    EPINEPHrine 0.3 MG/0.3ML Soaj injection  Commonly known as: EpiPen 2-Stanislav  Use as directed for allergic reaction  Notes to patient: Allergic Reaction     levothyroxine 50 MCG tablet  Commonly known as: SYNTHROID  Take 1 tablet by mouth Daily  Start taking on: 2022  Notes to patient: Hypothyroidism     oxybutynin 5 MG extended release tablet  Commonly known as: DITROPAN-XL  Notes to patient: Urinary     Vitamin D3 1.25 MG (48381 UT) Caps  Notes to patient: Vitamin supplement.          STOP taking these medications    acyclovir 400 MG tablet  Commonly known as: Zovirax  Notes to patient: Anti viral     fluocinolone acetonide 0.01 % external solution  Commonly known as: Synalar  Notes to patient: Itching     MULTI-VITAMIN PO  Notes to patient: Vitamin     Na Sulfate-K Sulfate-Mg Sulf 17.5-3.13-1.6 GM/177ML Soln  Notes to patient: Supplement     SUMAtriptan 100 MG tablet  Commonly known as: IMITREX  Notes to patient: Migraines     valACYclovir 1 g tablet  Commonly known as: VALTREX  Notes to patient: Anti viral           Where to Get Your Medications      These medications were sent to [facility]/pharmacy #[id number]    Phone: [phone]   · divalproex 250 MG DR tablet  · hydrOXYzine 50 MG tablet  · levothyroxine 50 MCG tablet  · lithium 150 MG capsule  · paliperidone 6 MG extended release tablet         Follow Up Appointment: Discharge to home address:  [address] [place] [address] [address]. Pierce Lorenz [id number]    Please send home by [facility]    [facility] [place]  [address].   Marion Smalls   [phone]      please call upon discharge to schedule appointment     [facility]  [address] [facility]  Vianca Smalls  Phone: [phone]  Fax: [phone]  On 1/14/2022  @ 9:45am for medication management
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
## 2022-01-10 NOTE — BH NOTE
[address] [facility]  Discharge Note    Pt discharged with followings belongings:   Dental Appliances: None  Vision - Corrective Lenses: None  Hearing Aid: None  Jewelry: Ring,Necklace,Earrings,Other (Comment) (tongue ring)  Body Piercings Removed: No  Clothing: Learta Donovan / coat,Pants,Socks,Other (Comment),Undergarments (Comment),Shirt (hoodie, bra, tank top)  Were All Patient Medications Collected?: Yes (patient had a bag of blue and white pills)  Other Valuables: Cell phone,Wallet,Keys,Other (Comment) (e-vape & liquid, eye solution, glasses , pepper spray, beats, scissors, epi-pen, 2 batteries, keys and fob)   Valuables sent home with Patient or returned to patient. Patient education on aftercare instructions: Yes  [facility] by Nurse  at 1:23 PM .Patient verbalize understanding of AVS:  Yes. Status EXAM upon discharge:  Status and Exam  Normal: No  Facial Expression: Flat  Affect: Appropriate  Level of Consciousness: Alert  Mood:Normal: No  Mood: Anxious,Depressed  Motor Activity:Normal: No  Motor Activity: Decreased  Interview Behavior: Cooperative  Preception: [place] to Person,[place] to Time,[place] to Place,[place] to Situation  Attention:Normal: No  Attention: Distractible  Thought Processes: Circumstantial  Thought Content:Normal: No  Thought Content: Preoccupations  Hallucinations: None  Delusions: No  Delusions:  (paranoid)  Memory:Normal: No  Memory: Poor Recent  Insight and Judgment: No  Insight and Judgment: Poor Judgment,Poor Insight  Present Suicidal Ideation: No  Present Homicidal Ideation: No      Metabolic Screening:    No results found for: LABA1C    No results found for: CHOL  No results found for: TRIG  No results found for: HDL  No components found for: LDLCAL  No results found for: LABVLDL     Patient discharged to home. Patient alert and oriented X4. Patient denies thoughts of harm to self or others. Patient discharged with all belongings. Christina Wilkinson, RN

## 2022-01-10 NOTE — PLAN OF CARE
Problem: Altered Mood, Psychotic Behavior:  Goal: Able to verbalize decrease in frequency and intensity of hallucinations  Description: Able to verbalize decrease in frequency and intensity of hallucinations  1/9/2022 2138 by Dirk Camacho LPN  Outcome: Ongoing   Patient denies hallucinations at this time. Problem: Altered Mood, Psychotic Behavior:  Goal: Absence of self-harm  Description: Absence of self-harm  1/9/2022 2138 by Dirk Camacho LPN  Outcome: Ongoing   Patient denies self harm at this time. No sign of self harm noted. Problem: Tobacco Use:  Goal: Inpatient tobacco use cessation counseling participation  Description: Inpatient tobacco use cessation counseling participation  1/9/2022 2138 by Dirk Camacho LPN  Outcome: Ongoing   Patient denies tobacco counseling at this time. Problem: Loneliness or Risk for Loneliness  Goal: Demonstrate positive use of time alone when socialization is not possible  1/9/2022 2138 by Dirk Camacho LPN  Outcome: Ongoing   Patient social with select peers. Will continue to monitor for safety every 15 minutes and provide support as needed.

## 2022-01-19 ENCOUNTER — VIRTUAL VISIT (OUTPATIENT)
Dept: FAMILY MEDICINE CLINIC | Age: 37
End: 2022-01-19
Payer: COMMERCIAL

## 2022-01-19 DIAGNOSIS — R74.8 ELEVATED LIVER ENZYMES: Primary | ICD-10-CM

## 2022-01-19 DIAGNOSIS — Z76.0 MEDICATION REFILL: ICD-10-CM

## 2022-01-19 DIAGNOSIS — Z00.00 HEALTH CARE MAINTENANCE: ICD-10-CM

## 2022-01-19 DIAGNOSIS — U07.1 COVID-19: ICD-10-CM

## 2022-01-19 PROCEDURE — 99213 OFFICE O/P EST LOW 20 MIN: CPT | Performed by: FAMILY MEDICINE

## 2022-01-19 PROCEDURE — 1111F DSCHRG MED/CURRENT MED MERGE: CPT | Performed by: FAMILY MEDICINE

## 2022-01-19 PROCEDURE — G8427 DOCREV CUR MEDS BY ELIG CLIN: HCPCS | Performed by: FAMILY MEDICINE

## 2022-01-19 RX ORDER — VALACYCLOVIR HYDROCHLORIDE 1 G/1
TABLET, FILM COATED ORAL
Qty: 21 TABLET | Refills: 0 | Status: SHIPPED | OUTPATIENT
Start: 2022-01-19 | End: 2022-02-09

## 2022-01-19 ASSESSMENT — ENCOUNTER SYMPTOMS
COUGH: 0
VOMITING: 0
SHORTNESS OF BREATH: 0
NAUSEA: 0

## 2022-01-19 NOTE — PROGRESS NOTES
Subjective:      Patient ID: Lakshmi Mancini is a 29 y.o. female    HPI  Here in follow up from recent hospital stay at [facility] in [facility] -for bipolar flare up. .   Did have some abnormalities with blood work and would like to get  Blood work repeated. Weight down slightly from last time. Had covid + testing done while admitted as she did develop slight cough and sore throat which has resolved. Review of Systems   Constitutional: Negative for chills and fever. Respiratory: Negative for cough and shortness of breath. Gastrointestinal: Negative for nausea and vomiting. Neurological: Negative for weakness. Reviewed allergy, medical, social, surgical, family and med list changes and updated   Files     Social History     Socioeconomic History    Marital status: Single     Spouse name: Not on file    Number of children: Not on file    Years of education: Not on file    Highest education level: Not on file   Occupational History    Not on file   Tobacco Use    Smoking status: Never Smoker    Smokeless tobacco: Never Used   Vaping Use    Vaping Use: Every day    Substances: Nicotine    Devices: Pre-filled or refillable cartridge   Substance and Sexual Activity    Alcohol use: No    Drug use: Yes     Types: Opiates      Comment: pt was on prescription pain medication    Sexual activity: Not on file   Other Topics Concern    Not on file   Social History Narrative    Not on file     Social Determinants of Health     Financial Resource Strain: Low Risk     Difficulty of Paying Living Expenses: Not hard at all   Food Insecurity: No Food Insecurity    Worried About Running Out of Food in the Last Year: Never true    Kristopher of Food in the Last Year: Never true   Transportation Needs:     Lack of Transportation (Medical): Not on file    Lack of Transportation (Non-Medical):  Not on file   Physical Activity:     Days of Exercise per Week: Not on file    Minutes of Exercise per Session: Not on file   Stress:     Feeling of Stress : Not on file   Social Connections:     Frequency of Communication with Friends and Family: Not on file    Frequency of Social Gatherings with Friends and Family: Not on file    Attends Yazidism Services: Not on file    Active Member of Clubs or Organizations: Not on file    Attends Club or Organization Meetings: Not on file    Marital Status: Not on file   Intimate Partner Violence:     Fear of Current or Ex-Partner: Not on file    Emotionally Abused: Not on file    Physically Abused: Not on file    Sexually Abused: Not on file   Housing Stability:     Unable to Pay for Housing in the Last Year: Not on file    Number of [place] in the Last Year: Not on file    Unstable Housing in the Last Year: Not on file     Current Outpatient Medications   Medication Sig Dispense Refill    hydrOXYzine (ATARAX) 50 MG tablet Take 1 tablet by mouth 3 times daily as needed for Anxiety 90 tablet 0    divalproex (DEPAKOTE) 250 MG DR tablet Take 1 tablet by mouth 2 times daily 60 tablet 0    lithium 150 MG capsule Take 3 capsules by mouth 2 times daily (with meals) 180 capsule 0    paliperidone (INVEGA) 6 MG extended release tablet Take 1 tablet by mouth daily 30 tablet 0    levothyroxine (SYNTHROID) 50 MCG tablet Take 1 tablet by mouth Daily 30 tablet 0    EPINEPHrine (EPIPEN 2-SANTHOSH) 0.3 MG/0.3ML SOAJ injection Use as directed for allergic reaction 1 each 1    omeprazole (PRILOSEC) 20 MG delayed release capsule TAKE 2 CAPSULES BY MOUTH EVERY DAY 30 capsule 1    oxybutynin (DITROPAN-XL) 5 MG extended release tablet Take 5 mg by mouth 2 times daily      Cholecalciferol (VITAMIN D3) 1.25 MG (14913 UT) CAPS Take by mouth       No current facility-administered medications for this visit. No family history on file.   Past Medical History:   Diagnosis Date    Anxiety     [facility] ([facility]     Bipolar affective disorder, mixed, severe, with psychotic behavior ([facility] 12/31/2021    Gastric ulcer     Opiate abuse, continuous (HCC)     Polycystic ovaries        TELEHEALTH EVALUATION -- Audio/Visual (During HDFVA-93 public health emergency)    -   Daljit Valadez is a 29 y.o. female being evaluated by a Virtual Visit (video visit) encounter to address concerns as mentioned above. A caregiver was present when appropriate. Due to this being a TeleHealth encounter (During NFMTW-23 public health emergency), evaluation of the following organ systems was limited: Vitals/Constitutional/EENT/Resp/CV/GI//MS/Neuro/Skin/Heme-Lymph-Imm. Pursuant to the emergency declaration under the [address] [place] [address] authority and the [employer] Act, this Virtual Visit was conducted with patient's (and/or legal guardian's) consent, to reduce the patient's risk of exposure to COVID-19 and provide necessary medical care. The patient (and/or legal guardian) has also been advised to contact this office for worsening conditions or problems, and seek emergency medical treatment and/or call 911 if deemed necessary. Services were provided through a video synchronous discussion virtually to substitute for in-person clinic visit. Type of encounter was __x Doxy __ MyChart ___Facetime    Patient was located at their home. Provider was located at their ___ home or        ___x_ office. --Adriana Matos MD on 1/20/2022 at 11:31 AM    An electronic signature was used to authenticate this note. Objective: There were no vitals taken for this visit. Physical Exam  Gen; no acute distress  Neuro; No focal deficits. No facial asymmetries  Pulmonary   Respirations unlabored   Assessment:       Diagnosis Orders   1. Elevated liver enzymes     2. Health care maintenance  Lipid Panel    Comprehensive Metabolic Panel    CBC Auto Differential   3. Medication refill     4.  COVID-19           Plan:      Orders Placed This Encounter   Procedures    Lipid Panel     Standing Status:   Future     Standing Expiration Date:   1/19/2023     Order Specific Question:   Is Patient Fasting?/# of Hours     Answer:   9    Comprehensive Metabolic Panel     Standing Status:   Future     Standing Expiration Date:   1/19/2023    CBC Auto Differential     Standing Status:   Future     Standing Expiration Date:   7/19/2022     Orders Placed This Encounter   Medications    valACYclovir (VALTREX) 1 g tablet     Sig: TAKE 1 TABLET BY MOUTH EVERY DAY     Dispense:  21 tablet     Refill:  0   f/u after blood work

## 2022-01-19 NOTE — LETTER
[facility]  [address] [facility] [address]  Phone: [phone]  Fax: [phone]    Ngozi Sigala MD        January 20, 2022    [facility] [address] [place] [address] Dr. Gay Nagel [id number]      Dear Eufemia Jones:    Here are the lab orders from [facility]. If you have any questions or concerns, please don't hesitate to call.     Sincerely,        Ngozi Sigala MD

## 2022-02-09 RX ORDER — VALACYCLOVIR HYDROCHLORIDE 1 G/1
TABLET, FILM COATED ORAL
Qty: 21 TABLET | Refills: 0 | Status: SHIPPED | OUTPATIENT
Start: 2022-02-09 | End: 2022-03-01 | Stop reason: SDUPTHER

## 2022-02-09 NOTE — TELEPHONE ENCOUNTER
Requesting medication refill.  Please approve or deny this request.    Rx requested:  Requested Prescriptions     Pending Prescriptions Disp Refills    valACYclovir (VALTREX) 1 g tablet [Pharmacy Med Name: VALACYCLOVIR HCL 1 GRAM TABLET] 21 tablet 0     Sig: TAKE 1 TABLET BY MOUTH EVERY DAY       Last Office Visit:   1/19/2022    Last Filled:      Last Labs:      Next Visit Date:  Future Appointments   Date Time Provider Ronan Kulkarni   3/4/2022 10:15 AM [facility]   3/25/2022 11:00 AM Jia Ramirez

## 2022-02-10 ENCOUNTER — TELEPHONE (OUTPATIENT)
Dept: GASTROENTEROLOGY | Age: 37
End: 2022-02-10

## 2022-02-10 DIAGNOSIS — Z12.11 ENCOUNTER FOR SCREENING COLONOSCOPY: Primary | ICD-10-CM

## 2022-02-10 DIAGNOSIS — Z98.84 HISTORY OF GASTRIC BYPASS: ICD-10-CM

## 2022-02-10 RX ORDER — SODIUM PICOSULFATE, MAGNESIUM OXIDE, AND ANHYDROUS CITRIC ACID 10; 3.5; 12 MG/160ML; G/160ML; G/160ML
LIQUID ORAL
Qty: 320 ML | Refills: 0 | Status: ON HOLD | OUTPATIENT
Start: 2022-02-10 | End: 2022-06-23

## 2022-02-10 NOTE — TELEPHONE ENCOUNTER
Patient called stating Johnny Red was denied by insurance. The prep was change to [place]. Patient had Gastric Bypass Surgery patient states the [place] is to much for her to drink. Please advise.  Thanks

## 2022-03-01 RX ORDER — VALACYCLOVIR HYDROCHLORIDE 1 G/1
TABLET, FILM COATED ORAL
Qty: 21 TABLET | Refills: 0 | Status: SHIPPED | OUTPATIENT
Start: 2022-03-01 | End: 2022-03-14 | Stop reason: SDUPTHER

## 2022-03-02 ENCOUNTER — TELEPHONE (OUTPATIENT)
Dept: GASTROENTEROLOGY | Age: 37
End: 2022-03-02

## 2022-03-02 NOTE — TELEPHONE ENCOUNTER
Patient is asking about a different prep because she had gastric bypass, she said that samples would be left for her. She wants to come pick them up. Please advise.

## 2022-03-08 DIAGNOSIS — Z98.84 HISTORY OF GASTRIC BYPASS: ICD-10-CM

## 2022-03-08 DIAGNOSIS — Z12.11 ENCOUNTER FOR SCREENING COLONOSCOPY: ICD-10-CM

## 2022-03-08 RX ORDER — SODIUM PICOSULFATE, MAGNESIUM OXIDE, AND ANHYDROUS CITRIC ACID 10; 3.5; 12 MG/160ML; G/160ML; G/160ML
LIQUID ORAL
Qty: 320 ML | Refills: 0 | OUTPATIENT
Start: 2022-03-08

## 2022-03-08 RX ORDER — FLUOCINOLONE ACETONIDE 0.1 MG/ML
SOLUTION TOPICAL 2 TIMES DAILY
COMMUNITY
End: 2022-03-08 | Stop reason: SDUPTHER

## 2022-03-08 RX ORDER — FLUOCINOLONE ACETONIDE 0.1 MG/ML
SOLUTION TOPICAL 2 TIMES DAILY
Qty: 1 EACH | Refills: 0 | Status: ON HOLD | OUTPATIENT
Start: 2022-03-08 | End: 2022-05-26 | Stop reason: SDUPTHER

## 2022-03-11 DIAGNOSIS — E61.1 LOW IRON: Primary | ICD-10-CM

## 2022-03-14 RX ORDER — VALACYCLOVIR HYDROCHLORIDE 1 G/1
TABLET, FILM COATED ORAL
Qty: 21 TABLET | Refills: 0 | Status: SHIPPED | OUTPATIENT
Start: 2022-03-14 | End: 2022-03-23 | Stop reason: SDUPTHER

## 2022-03-22 RX ORDER — EPINEPHRINE 0.3 MG/.3ML
INJECTION SUBCUTANEOUS
Qty: 1 EACH | Refills: 1 | Status: ON HOLD | OUTPATIENT
Start: 2022-03-22

## 2022-03-23 ENCOUNTER — TELEMEDICINE (OUTPATIENT)
Dept: FAMILY MEDICINE CLINIC | Age: 37
End: 2022-03-23
Payer: COMMERCIAL

## 2022-03-23 DIAGNOSIS — Z76.0 MEDICATION REFILL: ICD-10-CM

## 2022-03-23 DIAGNOSIS — Z98.84 HISTORY OF GASTRIC BYPASS: ICD-10-CM

## 2022-03-23 DIAGNOSIS — D64.9 ANEMIA, UNSPECIFIED TYPE: Primary | ICD-10-CM

## 2022-03-23 PROBLEM — T78.40XA ALLERGIC REACTION: Status: ACTIVE | Noted: 2022-03-23

## 2022-03-23 PROBLEM — T63.441A ALLERGIC REACTION TO BEE STING: Status: ACTIVE | Noted: 2022-03-23

## 2022-03-23 PROCEDURE — 99213 OFFICE O/P EST LOW 20 MIN: CPT | Performed by: FAMILY MEDICINE

## 2022-03-23 RX ORDER — VALACYCLOVIR HYDROCHLORIDE 1 G/1
TABLET, FILM COATED ORAL
Qty: 21 TABLET | Refills: 5 | Status: ON HOLD | OUTPATIENT
Start: 2022-03-23

## 2022-03-23 RX ORDER — FERROUS SULFATE 325(65) MG
325 TABLET ORAL
Qty: 30 TABLET | Refills: 2 | Status: SHIPPED | OUTPATIENT
Start: 2022-03-23 | End: 2022-05-16 | Stop reason: SDUPTHER

## 2022-03-23 ASSESSMENT — ENCOUNTER SYMPTOMS
ABDOMINAL PAIN: 0
CONSTIPATION: 0

## 2022-03-23 NOTE — PROGRESS NOTES
Subjective:      Patient ID: Waleska Gil is a 29 y.o. female    HPI  Here for follow up of low iron that was noted during recent hospital stay on psychiatric floor. Has been on ferrous sulfate bid -   Review of Systems   Constitutional: Negative for unexpected weight change. Gastrointestinal: Negative for abdominal pain and constipation. Neurological: Negative for dizziness. Waleska Gil is a 29 y.o. female evaluated vi     Reviewed allergy, medical, social, surgical, family and med list changes and updated   Files   Waleska Gil is a 29 y.o. female evaluated via telephone on 3/23/2022. Consent:  She and/or health care decision maker is aware that that she may receive a bill for this telephone service, depending on her insurance coverage, and has provided verbal consent to proceed: Yes      Documentation:  I communicated with the patient and/or health care decision maker about   Details of this discussion including any medical advice provided: This visit was a telephone encounter. Patient was located at their home. Provider was located at their ___ home or        __x__ office. I affirm this is a Patient Initiated Episode with an Established Patient who has not had a related appointment within my department in the past 7 days or scheduled within the next 24 hours.     Total Time: minutes: 11-20 minutes    Note: not billable if this call serves to triage the patient into an appointment for the relevant concern      Aide Horne MD   Social History     Socioeconomic History    Marital status: Single     Spouse name: None    Number of children: None    Years of education: None    Highest education level: None   Occupational History    None   Tobacco Use    Smoking status: Never Smoker    Smokeless tobacco: Never Used   Vaping Use    Vaping Use: Every day    Substances: Nicotine    Devices: Pre-filled or refillable cartridge   Substance and Sexual Activity    Alcohol use: No    Drug use: Yes     Types: Opiates      Comment: pt was on prescription pain medication    Sexual activity: None   Other Topics Concern    None   Social History Narrative    None     Social Determinants of Health     Financial Resource Strain: Low Risk     Difficulty of Paying Living Expenses: Not hard at all   Food Insecurity: No Food Insecurity    Worried About Running Out of Food in the Last Year: Never true    Kristopher of Food in the Last Year: Never true   Transportation Needs:     Lack of Transportation (Medical): Not on file    Lack of Transportation (Non-Medical): Not on file   Physical Activity:     Days of Exercise per Week: Not on file    Minutes of Exercise per Session: Not on file   Stress:     Feeling of Stress : Not on file   Social Connections:     Frequency of Communication with Friends and Family: Not on file    Frequency of Social Gatherings with Friends and Family: Not on file    Attends Gnosticist Services: Not on file    Active Member of [address] [place] [address] or Organizations: Not on file    Attends Club or Organization Meetings: Not on file    Marital Status: Not on file   Intimate Partner Violence:     Fear of Current or Ex-Partner: Not on file    Emotionally Abused: Not on file    Physically Abused: Not on file    Sexually Abused: Not on file   Housing Stability:     Unable to Pay for Housing in the Last Year: Not on file    Number of [place] in the Last Year: Not on file    Unstable Housing in the Last Year: Not on file     Current Outpatient Medications   Medication Sig Dispense Refill    EPINEPHrine (EPIPEN 2-SANTHOSH) 0.3 MG/0.3ML SOAJ injection Use as directed for allergic reaction 1 each 1    valACYclovir (VALTREX) 1 g tablet 1 po q day 21 tablet 0    fluocinolone acetonide (SYNALAR) 0.01 % external solution Apply topically 2 times daily Apply topically 2 times daily.  1 each 0    Sod Picosulfate-Mag Ox-Cit Acd (CLENPIQ) 10-3.5-12 MG-GM -GM/160ML SOLN solution Take as directed 320 mL 0    divalproex (DEPAKOTE) 250 MG DR tablet Take 1 tablet by mouth 2 times daily 60 tablet 0    lithium 150 MG capsule Take 3 capsules by mouth 2 times daily (with meals) 180 capsule 0    paliperidone (INVEGA) 6 MG extended release tablet Take 1 tablet by mouth daily 30 tablet 0    levothyroxine (SYNTHROID) 50 MCG tablet Take 1 tablet by mouth Daily 30 tablet 0    omeprazole (PRILOSEC) 20 MG delayed release capsule TAKE 2 CAPSULES BY MOUTH EVERY DAY 30 capsule 1    oxybutynin (DITROPAN-XL) 5 MG extended release tablet Take 5 mg by mouth 2 times daily      Cholecalciferol (VITAMIN D3) 1.25 MG (33736 UT) CAPS Take by mouth       No current facility-administered medications for this visit. History reviewed. No pertinent family history. Past Medical History:   Diagnosis Date    Anxiety     [facility]     Bipolar affective disorder, mixed, severe, with psychotic behavior ([facility] 2021    Gastric ulcer     Opiate abuse, continuous ([facility]     Polycystic ovaries      Objective: There were no vitals taken for this visit. Physical Exam  No exam done   Assessment:       Diagnosis Orders   1. Anemia, unspecified type     2. History of gastric bypass     3. Medication refill           Plan:      Orders Placed This Encounter   Medications    valACYclovir (VALTREX) 1 g tablet     Si po q day     Dispense:  21 tablet     Refill:  5    ferrous sulfate (IRON 325) 325 (65 Fe) MG tablet     Sig: Take 1 tablet by mouth daily (with breakfast)     Dispense:  30 tablet     Refill:  2     Orders Placed This Encounter   Procedures    Basic Metabolic Panel     Standing Status:   Future     Standing Expiration Date:   3/23/2023    Iron     Standing Status:   Future     Standing Expiration Date:   3/23/2023     Order Specific Question:   Is Patient Fasting? Answer:   no     Order Specific Question:   No of Hours?      Answer:   0    CBC with Auto Differential     Standing Status:   Future     Standing Expiration Date:   6/23/2022    Vitamin B12 & Folate     Standing Status:   Future     Standing Expiration Date:   3/23/2023    Hepatic Function Panel     Standing Status:   Future     Standing Expiration Date:   3/23/2023    CK     Standing Status:   Future     Standing Expiration Date:   3/23/2023   f/u after non fasting blood work in 3 months

## 2022-04-11 ENCOUNTER — ANESTHESIA EVENT (OUTPATIENT)
Dept: ENDOSCOPY | Age: 37
End: 2022-04-11
Payer: COMMERCIAL

## 2022-04-13 ENCOUNTER — ANESTHESIA (OUTPATIENT)
Dept: ENDOSCOPY | Age: 37
End: 2022-04-13
Payer: COMMERCIAL

## 2022-04-19 RX ORDER — FERROUS SULFATE TAB EC 324 MG (65 MG FE EQUIVALENT) 324 (65 FE) MG
TABLET DELAYED RESPONSE ORAL
COMMUNITY
Start: 2022-03-30 | End: 2022-04-20 | Stop reason: SDUPTHER

## 2022-04-20 DIAGNOSIS — D64.9 ANEMIA, UNSPECIFIED TYPE: Primary | ICD-10-CM

## 2022-04-20 RX ORDER — FERROUS SULFATE TAB EC 324 MG (65 MG FE EQUIVALENT) 324 (65 FE) MG
TABLET DELAYED RESPONSE ORAL
Qty: 30 TABLET | Refills: 0 | Status: ON HOLD | OUTPATIENT
Start: 2022-04-20

## 2022-04-21 ENCOUNTER — TELEMEDICINE (OUTPATIENT)
Dept: FAMILY MEDICINE CLINIC | Age: 37
End: 2022-04-21
Payer: COMMERCIAL

## 2022-04-21 DIAGNOSIS — L68.0 FEMALE HIRSUTISM: ICD-10-CM

## 2022-04-21 DIAGNOSIS — E78.5 HYPERLIPIDEMIA, UNSPECIFIED HYPERLIPIDEMIA TYPE: ICD-10-CM

## 2022-04-21 DIAGNOSIS — E28.2 PCOS (POLYCYSTIC OVARIAN SYNDROME): Primary | ICD-10-CM

## 2022-04-21 PROCEDURE — 99442 PR PHYS/QHP TELEPHONE EVALUATION 11-20 MIN: CPT | Performed by: FAMILY MEDICINE

## 2022-04-21 NOTE — PROGRESS NOTES
Subjective:      Patient ID: Cassandra Maria is a 29 y.o. female     Here with request for some hormonal testing related to pcos. Has pharmacy in [place] which can put together a preparation for this. Does have facial hair   And irregular cycles. Review of Systems   Constitutional: Negative for chills and fever. Genitourinary: Positive for menstrual problem. Neurological: Negative for weakness. Reviewed allergy, medical, social, surgical, family and med list changes and updated   Files     Social History     Socioeconomic History    Marital status: Single     Spouse name: Not on file    Number of children: Not on file    Years of education: Not on file    Highest education level: Not on file   Occupational History    Not on file   Tobacco Use    Smoking status: Never Smoker    Smokeless tobacco: Never Used   Vaping Use    Vaping Use: Every day    Substances: Nicotine    Devices: Pre-filled or refillable cartridge   Substance and Sexual Activity    Alcohol use: No    Drug use: Yes     Types: Opiates      Comment: pt was on prescription pain medication    Sexual activity: Not on file   Other Topics Concern    Not on file   Social History Narrative    Not on file     Social Determinants of Health     Financial Resource Strain: Low Risk     Difficulty of Paying Living Expenses: Not hard at all   Food Insecurity: No Food Insecurity    Worried About Running Out of Food in the Last Year: Never true    Kristopher of Food in the Last Year: Never true   Transportation Needs:     Lack of Transportation (Medical): Not on file    Lack of Transportation (Non-Medical):  Not on file   Physical Activity:     Days of Exercise per Week: Not on file    Minutes of Exercise per Session: Not on file   Stress:     Feeling of Stress : Not on file   Social Connections:     Frequency of Communication with Friends and Family: Not on file    Frequency of Social Gatherings with Friends and Family: Not on file    Attends Moravian Services: Not on file    Active Member of Clubs or Organizations: Not on file    Attends Club or Organization Meetings: Not on file    Marital Status: Not on file   Intimate Partner Violence:     Fear of Current or Ex-Partner: Not on file    Emotionally Abused: Not on file    Physically Abused: Not on file    Sexually Abused: Not on file   Housing Stability:     Unable to Pay for Housing in the Last Year: Not on file    Number of [place] in the Last Year: Not on file    Unstable Housing in the Last Year: Not on file     Current Outpatient Medications   Medication Sig Dispense Refill    ferrous sulfate 324 (65 Fe) MG EC tablet TAKE 1 TABLET BY MOUTH TWICE A DAY AT 7:30 AM AND 4:30 PM 30 tablet 0    valACYclovir (VALTREX) 1 g tablet 1 po q day 21 tablet 5    ferrous sulfate (IRON 325) 325 (65 Fe) MG tablet Take 1 tablet by mouth daily (with breakfast) 30 tablet 2    EPINEPHrine (EPIPEN 2-SANTHOSH) 0.3 MG/0.3ML SOAJ injection Use as directed for allergic reaction 1 each 1    fluocinolone acetonide (SYNALAR) 0.01 % external solution Apply topically 2 times daily Apply topically 2 times daily.  1 each 0    Sod Picosulfate-Mag Ox-Cit Acd (CLENPIQ) 10-3.5-12 MG-GM -GM/160ML SOLN solution Take as directed 320 mL 0    divalproex (DEPAKOTE) 250 MG DR tablet Take 1 tablet by mouth 2 times daily 60 tablet 0    lithium 150 MG capsule Take 3 capsules by mouth 2 times daily (with meals) 180 capsule 0    paliperidone (INVEGA) 6 MG extended release tablet Take 1 tablet by mouth daily 30 tablet 0    levothyroxine (SYNTHROID) 50 MCG tablet Take 1 tablet by mouth Daily 30 tablet 0    omeprazole (PRILOSEC) 20 MG delayed release capsule TAKE 2 CAPSULES BY MOUTH EVERY DAY 30 capsule 1    oxybutynin (DITROPAN-XL) 5 MG extended release tablet Take 5 mg by mouth 2 times daily      Cholecalciferol (VITAMIN D3) 1.25 MG (92046 UT) CAPS Take by mouth       No current facility-administered medications for this visit. No family history on file. Past Medical History:   Diagnosis Date    Anxiety     [facility] ([facility]     Bipolar affective disorder, mixed, severe, with psychotic behavior ([facility] 12/31/2021    Gastric ulcer     Opiate abuse, continuous ([facility]     Polycystic ovaries    Kayla Whitaker is a 29 y.o. female evaluated via telephone on 4/21/2022. Consent:  She and/or health care decision maker is aware that that she may receive a bill for this telephone service, depending on her insurance coverage, and has provided verbal consent to proceed: Yes      Documentation:  I communicated with the patient and/or health care decision maker about   Details of this discussion including any medical advice provided: This visit was a telephone encounter. Patient was located at their home. Provider was located at their ___ home or        _x___ office. I affirm this is a Patient Initiated Episode with an Established Patient who has not had a related appointment within my department in the past 7 days or scheduled within the next 24 hours. Total Time: minutes: 11-20 minutes    Note: not billable if this call serves to triage the patient into an appointment for the relevant concern      Mckinley Baird MD   Objective: There were no vitals taken for this visit. Physical Exam  No exam done   Assessment:       Diagnosis Orders   1. PCOS (polycystic ovarian syndrome)     2. Female hirsutism     3.  Hyperlipidemia, unspecified hyperlipidemia type           Plan:      Labs   Orders Placed This Encounter   Procedures    Thyroglobulin And Anti-Thyroglobulin Ab     Standing Status:   Future     Standing Expiration Date:   4/21/2023    Thyroid Peroxidase Antibody     Standing Status:   Future     Standing Expiration Date:   4/21/2023    Ferritin     Standing Status:   Future     Standing Expiration Date:   4/21/2023    Serotonin, Serum     Standing Status:   Future     Standing Expiration Date:   4/21/2023    C-Reactive Protein     Standing Status:   Future     Standing Expiration Date:   4/21/2023    Lipid Panel     Standing Status:   Future     Standing Expiration Date:   4/21/2023     Order Specific Question:   Is Patient Fasting?/# of Hours     Answer:   yes / 12 hrs    Homocysteine     Standing Status:   Future     Standing Expiration Date:   4/21/2023    TSH     Standing Status:   Future     Standing Expiration Date:   4/21/2023    T3, Free     Standing Status:   Future     Standing Expiration Date:   4/21/2023    T4     Standing Status:   Future     Standing Expiration Date:   4/21/2023    T4, Free     Standing Status:   Future     Standing Expiration Date:   4/21/2023    T3, Reverse     Standing Status:   Future     Standing Expiration Date:   4/21/2023    Estradiol     Standing Status:   Future     Standing Expiration Date:   4/21/2023    Estrone     Standing Status:   Future     Standing Expiration Date:   4/21/2023    Progesterone, Quantitative     Standing Status:   Future     Standing Expiration Date:   4/21/2023    Testosterone, total     Standing Status:   Future     Standing Expiration Date:   4/21/2023    Testosterone, free, total     Standing Status:   Future     Standing Expiration Date:   4/21/2023    Sex Hormone Binding Globulin     Standing Status:   Future     Standing Expiration Date:   4/21/2023    Vitamin D 25 Hydroxy     Standing Status:   Future     Standing Expiration Date:   4/21/2023    DHEA-Sulfate     Standing Status:   Future     Standing Expiration Date:   4/21/2023    Cortisol Total     Standing Status:   Future     Standing Expiration Date:   4/21/2023     Order Specific Question:   8AM or 4PM?     Answer:   8am or 4 pm     F/u dependent on above

## 2022-05-06 ENCOUNTER — TELEPHONE (OUTPATIENT)
Dept: FAMILY MEDICINE CLINIC | Age: 37
End: 2022-05-06

## 2022-05-06 NOTE — TELEPHONE ENCOUNTER
Anh from [address] [place] [address] called about bw orders. She is requesting a new dx for the Vit D 25 and the ferritin. Please fax to [phone]  Attn: Anh    Thank you.

## 2022-05-09 DIAGNOSIS — D64.9 ANEMIA, UNSPECIFIED TYPE: Primary | ICD-10-CM

## 2022-05-10 DIAGNOSIS — E55.9 VITAMIN D DEFICIENCY: Primary | ICD-10-CM

## 2022-05-16 RX ORDER — FERROUS SULFATE 325(65) MG
325 TABLET ORAL
Qty: 30 TABLET | Refills: 0 | Status: ON HOLD | OUTPATIENT
Start: 2022-05-16 | End: 2022-09-07 | Stop reason: SDUPTHER

## 2022-05-24 ENCOUNTER — ANCILLARY PROCEDURE (OUTPATIENT)
Dept: ENDOSCOPY | Age: 37
End: 2022-05-24
Attending: INTERNAL MEDICINE
Payer: COMMERCIAL

## 2022-05-24 ENCOUNTER — HOSPITAL ENCOUNTER (OUTPATIENT)
Age: 37
Setting detail: OUTPATIENT SURGERY
Discharge: HOME | End: 2022-05-24
Attending: INTERNAL MEDICINE | Admitting: INTERNAL MEDICINE
Payer: COMMERCIAL

## 2022-05-24 VITALS
SYSTOLIC BLOOD PRESSURE: 103 MMHG | OXYGEN SATURATION: 100 % | BODY MASS INDEX: 23.9 KG/M2 | HEART RATE: 68 BPM | RESPIRATION RATE: 18 BRPM | WEIGHT: 140 LBS | HEIGHT: 64 IN | TEMPERATURE: 97.4 F | DIASTOLIC BLOOD PRESSURE: 52 MMHG

## 2022-05-24 LAB
HCG, URINE, POC: NEGATIVE
Lab: NORMAL
NEGATIVE QC PASS/FAIL: NORMAL
POSITIVE QC PASS/FAIL: NORMAL

## 2022-05-24 PROCEDURE — 2580000003 HC RX 258: Performed by: INTERNAL MEDICINE

## 2022-05-24 PROCEDURE — 6370000000 HC RX 637 (ALT 250 FOR IP): Performed by: INTERNAL MEDICINE

## 2022-05-24 RX ORDER — SODIUM CHLORIDE 9 MG/ML
INJECTION, SOLUTION INTRAVENOUS
Status: DISPENSED
Start: 2022-05-24 | End: 2022-05-24

## 2022-05-24 RX ORDER — SODIUM CHLORIDE 9 MG/ML
INJECTION, SOLUTION INTRAVENOUS CONTINUOUS
Status: ACTIVE | OUTPATIENT
Start: 2022-05-24

## 2022-05-24 RX ORDER — MAGNESIUM HYDROXIDE 1200 MG/15ML
LIQUID ORAL PRN
Status: SHIPPED | OUTPATIENT
Start: 2022-05-24

## 2022-05-24 RX ORDER — SIMETHICONE 20 MG/.3ML
EMULSION ORAL PRN
Status: SHIPPED | OUTPATIENT
Start: 2022-05-24

## 2022-05-24 ASSESSMENT — PAIN - FUNCTIONAL ASSESSMENT: PAIN_FUNCTIONAL_ASSESSMENT: NONE - DENIES PAIN

## 2022-05-24 NOTE — ANESTHESIA PRE PROCEDURE
Department of Anesthesiology  Preprocedure Note       Name:  Shamika Romero   Age:  29 y.o.  :  1985                                          MRN:  [id number]         Date:  2022      Surgeon: Luana Meade):  Oren Son MD    Procedure: Procedure(s):  EGD DIAGNOSTIC ONLY  COLONOSCOPY DIAGNOSTIC    Medications prior to admission:   Prior to Admission medications    Medication Sig Start Date End Date Taking? Authorizing Provider   ferrous sulfate (IRON 325) 325 (65 Fe) MG tablet Take 1 tablet by mouth daily (with breakfast) 22   Inocencio Blanco MD   ferrous sulfate 324 (65 Fe) MG EC tablet TAKE 1 TABLET BY MOUTH TWICE A DAY AT 7:30 AM AND 4:30 PM 22   Inocencio Blanco MD   valACYclovir (VALTREX) 1 g tablet 1 po q day 3/23/22   Inocencio Blanco MD   EPINEPHrine (EPIPEN 2-SANTHOSH) 0.3 MG/0.3ML SOAJ injection Use as directed for allergic reaction 3/22/22   Inocencio Blanco MD   fluocinolone acetonide (SYNALAR) 0.01 % external solution Apply topically 2 times daily Apply topically 2 times daily.  3/8/22   Inocencio Blanco MD   Sod Pic[facility]ate-Mag [facility]Cit [facility]) 10-3.5-12 MG-GM -GM/160ML SOLN solution Take as directed 2/10/22   SILVANO Encarnacion - CNP   divalproex (DEPAKOTE) 250 MG DR tablet Take 1 tablet by mouth 2 times daily 1/10/22   Rosanna Tran MD   lithium 150 MG capsule Take 3 capsules by mouth 2 times daily (with meals) 1/10/22   Rosanna Tran MD   paliperidone (INVEGA) 6 MG extended release tablet Take 1 tablet by mouth daily 22   Rosanna Tran MD   levothyroxine (SYNTHROID) 50 MCG tablet Take 1 tablet by mouth Daily 22   Rosanna Tran MD   omeprazole (PRILOSEC) 20 MG delayed release capsule TAKE 2 CAPSULES BY MOUTH EVERY DAY 21   Inocencio Blanco MD   oxybutynin (DITROPAN-XL) 5 MG extended release tablet Take 5 mg by mouth 2 times daily    Historical Provider, MD   Cholecalciferol (VITAMIN D3) 1.25 MG (36739 UT) 139 01/03/2022    K 4.6 01/03/2022     01/03/2022    CO2 28 01/03/2022    BUN 11 01/03/2022    CREATININE 0.67 01/03/2022    GFRAA >60 01/03/2022    LABGLOM >60 01/03/2022    GLUCOSE 85 01/03/2022    PROT 5.5 01/03/2022    CALCIUM 8.8 01/03/2022    BILITOT <0.15 01/03/2022    ALKPHOS 56 01/03/2022    AST 11 01/03/2022    ALT 9 01/03/2022       POC Tests: No results for input(s): POCGLU, POCNA, POCK, POCCL, POCBUN, POCHEMO, POCHCT in the last 72 hours. Coags:   Lab Results   Component Value Date    PROTIME 10.3 02/19/2019    INR 1.0 02/19/2019    APTT 24.3 02/19/2019       HCG (If Applicable):   Lab Results   Component Value Date    PREGTESTUR NEGATIVE 10/24/2016        ABGs: No results found for: PHART, PO2ART, UQR2EWG, UHJ7PML, BEART, E5CJLXWA     Type & Screen (If Applicable):  No results found for: LABABO, LABRH    Drug/Infectious Status (If Applicable):  No results found for: HIV, HEPCAB    COVID-19 Screening (If Applicable):   Lab Results   Component Value Date    COVID19 DETECTED 01/05/2022           Anesthesia Evaluation  Patient summary reviewed and Nursing notes reviewed no history of anesthetic complications:   Airway: Mallampati: II  TM distance: >3 FB   Neck ROM: full  Mouth opening: > = 3 FB   Dental: normal exam         Pulmonary:Negative Pulmonary ROS and normal exam                               Cardiovascular:Negative CV ROS  Exercise tolerance: good (>4 METS),         ECG reviewed               Beta Blocker:  Not on Beta Blocker         Neuro/Psych:   (+) psychiatric history:            GI/Hepatic/Renal:   (+) PUD,           Endo/Other: Negative Endo/Other ROS             Pt had PAT visit. Abdominal:             Vascular: negative vascular ROS. Other Findings:           Anesthesia Plan      MAC     ASA 3       Induction: intravenous.                             Joce Cope, APRN - CRNA   5/24/2022

## 2022-05-24 NOTE — H&P
Patient Name: Av Gaines  : 1985  MRN: [id number]  DATE: 22      ENDOSCOPY  History and Physical    Procedure:    [x] Diagnostic Colonoscopy       [] Screening Colonoscopy  [x] EGD      [] ERCP      [] EUS       [] Other    [x] Previous office notes/History and Physical reviewed from the patients chart. Please see EMR for further details of HPI. I have examined the patient's status immediately prior to the procedure and:      Indications/HPI:    [x]Abdominal Pain   []Cancer- GI/Lung  []Fhx of colon CA/polyps  []History of Polyps   []Terans   []Melena  []Abnormal Imaging   []Dysphagia    []Persistent Pneumonia  []Anemia   []Food Impaction  []History of Polyps  []GI Bleed   []Pulmonary nodule/Mass  []Change in bowel habits  []Heartburn/Reflux  []Rectal Bleed (BRBPR)  []Chest Pain - Non Cardiac  []Heme (+) Stool  []Ulcers  []Constipation   []Hemoptysis   []Varices  []Diarrhea   []Hypoxemia  []Nausea/Vomiting   []Screening   []Crohns/Colitis  []Other:    Anesthesia:   [x] MAC [] Moderate Sedation   [] General   [] None     ROS: 12 pt Review of Symptoms was negative unless mentioned above    Medications:   Prior to Admission medications    Medication Sig Start Date End Date Taking? Authorizing Provider   ferrous sulfate (IRON 325) 325 (65 Fe) MG tablet Take 1 tablet by mouth daily (with breakfast) 22   Adolfo Alcaraz MD   ferrous sulfate 324 (65 Fe) MG EC tablet TAKE 1 TABLET BY MOUTH TWICE A DAY AT 7:30 AM AND 4:30 PM 22   Adolfo Alcaraz MD   valACYclovir (VALTREX) 1 g tablet 1 po q day 3/23/22   Adolfo Alcaraz MD   EPINEPHrine (EPIPEN 2-SANTHOSH) 0.3 MG/0.3ML SOAJ injection Use as directed for allergic reaction 3/22/22   Adolfo Alcaraz MD   fluocinolone acetonide (SYNALAR) 0.01 % external solution Apply topically 2 times daily Apply topically 2 times daily.  3/8/22   Adolfo Alcaraz MD   Sod Picosulfate-Mag Ox-Cit Acd [facility]) 10-3.5-12 MG-GM -GM/160ML SOLN solution Take as directed 2/10/22   Lacey Louise APRN - CNP   divalproex (DEPAKOTE) 250 MG DR tablet Take 1 tablet by mouth 2 times daily 1/10/22   Rafael Simpson MD   lithium 150 MG capsule Take 3 capsules by mouth 2 times daily (with meals) 1/10/22   Rafael Simpson MD   paliperidone (INVEGA) 6 MG extended release tablet Take 1 tablet by mouth daily 1/11/22   Rafael Simpson MD   levothyroxine (SYNTHROID) 50 MCG tablet Take 1 tablet by mouth Daily 1/11/22   Rafael Simpson MD   omeprazole (PRILOSEC) 20 MG delayed release capsule TAKE 2 CAPSULES BY MOUTH EVERY DAY 8/21/21   Macy Mcburney, MD   oxybutynin (DITROPAN-XL) 5 MG extended release tablet Take 5 mg by mouth 2 times daily    Historical Provider, MD   Cholecalciferol (VITAMIN D3) 1.25 MG (84969 UT) CAPS Take by mouth    Historical Provider, MD       Allergies: Allergies   Allergen Reactions    Bee Venom      Other reaction(s): Anaphylactic Shock    Other      Other reaction(s): anaphylaxis        History of allergic reaction to anesthesia:  No    Past Medical History:  Past Medical History:   Diagnosis Date    Anxiety     [facility] [address]     Bipolar affective disorder, mixed, severe, with psychotic behavior ([facility] [address] 12/31/2021    Gastric ulcer     Opiate abuse, continuous ([facility] [address]     Polycystic ovaries        Past Surgical History:  Past Surgical History:   Procedure Laterality Date    CHOLECYSTECTOMY      COSMETIC SURGERY      GASTRIC BYPASS SURGERY         Social History:  Social History     Tobacco Use    Smoking status: Never Smoker    Smokeless tobacco: Never Used   Vaping Use    Vaping Use: Every day    Substances: Nicotine    Devices: Pre-filled or refillable cartridge   Substance Use Topics    Alcohol use: No    Drug use: Yes     Types: Opiates      Comment: pt was on prescription pain medication       Vital Signs: There were no vitals filed for this visit.      Physical Exam:  Cardiac:  [x]WNL  []Comments:  Pulmonary:  [x]WNL   []Comments:   Neuro/Mental Status:  [x]WNL  []Comments:  Abdominal:  [x]WNL    []Comments:  Other:   []WNL  []Comments:    Informed Consent:  The risks and benefits of the procedure have been discussed with either the patient or if they cannot consent, their representative. Assessment:  Patient examined and appropriate for planned sedation and procedure. Plan:  Proceed with planned sedation and procedure as above.     Oren Son MD  9:31 AM

## 2022-05-26 RX ORDER — FLUOCINOLONE ACETONIDE 0.1 MG/ML
SOLUTION TOPICAL 2 TIMES DAILY
Qty: 1 EACH | Refills: 0 | Status: SHIPPED | OUTPATIENT
Start: 2022-05-26 | End: 2022-08-02 | Stop reason: SDUPTHER

## 2022-05-26 RX ORDER — SODIUM CHLORIDE, SODIUM LACTATE, POTASSIUM CHLORIDE, CALCIUM CHLORIDE 600; 310; 30; 20 MG/100ML; MG/100ML; MG/100ML; MG/100ML
INJECTION, SOLUTION INTRAVENOUS
Status: DISPENSED
Start: 2022-05-26 | End: 2022-05-26

## 2022-06-10 ENCOUNTER — TELEMEDICINE (OUTPATIENT)
Dept: FAMILY MEDICINE CLINIC | Age: 37
End: 2022-06-10

## 2022-06-10 DIAGNOSIS — G89.29 CHRONIC NONINTRACTABLE HEADACHE, UNSPECIFIED HEADACHE TYPE: ICD-10-CM

## 2022-06-10 DIAGNOSIS — A60.00 GENITAL HERPES SIMPLEX, UNSPECIFIED SITE: Primary | ICD-10-CM

## 2022-06-10 DIAGNOSIS — R20.2 PARESTHESIA: ICD-10-CM

## 2022-06-10 DIAGNOSIS — R51.9 CHRONIC NONINTRACTABLE HEADACHE, UNSPECIFIED HEADACHE TYPE: ICD-10-CM

## 2022-06-10 PROCEDURE — 99442 PR PHYS/QHP TELEPHONE EVALUATION 11-20 MIN: CPT | Performed by: FAMILY MEDICINE

## 2022-06-10 RX ORDER — ACYCLOVIR 400 MG/1
400 TABLET ORAL 2 TIMES DAILY
Qty: 60 TABLET | Refills: 3 | Status: SHIPPED | OUTPATIENT
Start: 2022-06-10 | End: 2022-09-07 | Stop reason: SDUPTHER

## 2022-06-10 ASSESSMENT — ENCOUNTER SYMPTOMS
VOMITING: 0
NAUSEA: 0

## 2022-06-10 NOTE — LETTER
[facility] [id number]  Phone: [phone]  Fax: [phone]    Jace Bryant MD        Laney 10, 2022    [phone]      Dear Lestine Cushing:    Here is you recent lab that was ordered on 6-10-22. If you have any questions or concerns, please don't hesitate to call.     Sincerely,        Jace Bryant MD

## 2022-06-10 NOTE — PROGRESS NOTES
Subjective:      Patient ID: Kel Barkley is a 40 y.o. female    HPI  Here in follow up for genital herpes. Would like to change back to acyclovir as covered better by insurance. Most recent outbreak was within 2 months or so  Would like referral for headaches and chronic paresthesias. Was seeing neurology for this but would like another opinion       Review of Systems   Constitutional: Negative for unexpected weight change. Gastrointestinal: Negative for nausea and vomiting. Neurological: Negative for weakness. Reviewed allergy, medical, social, surgical, family and med list changes and updated   Files     Social History     Socioeconomic History    Marital status: Single     Spouse name: Not on file    Number of children: Not on file    Years of education: Not on file    Highest education level: Not on file   Occupational History    Not on file   Tobacco Use    Smoking status: Never Smoker    Smokeless tobacco: Never Used   Vaping Use    Vaping Use: Some days    Substances: Nicotine    Devices: Pre-filled or refillable cartridge   Substance and Sexual Activity    Alcohol use: No    Drug use: Not Currently     Types: Opiates      Comment: pt was on prescription pain medication    Sexual activity: Not on file   Other Topics Concern    Not on file   Social History Narrative    Not on file     Social Determinants of Health     Financial Resource Strain: Low Risk     Difficulty of Paying Living Expenses: Not hard at all   Food Insecurity: No Food Insecurity    Worried About Running Out of Food in the Last Year: Never true    Kristopher of Food in the Last Year: Never true   Transportation Needs:     Lack of Transportation (Medical): Not on file    Lack of Transportation (Non-Medical):  Not on file   Physical Activity:     Days of Exercise per Week: Not on file    Minutes of Exercise per Session: Not on file   Stress:     Feeling of Stress : Not on file   Social Connections:     Frequency of Communication with Friends and Family: Not on file    Frequency of Social Gatherings with Friends and Family: Not on file    Attends Rastafarian Services: Not on file    Active Member of Clubs or Organizations: Not on file    Attends Club or Organization Meetings: Not on file    Marital Status: Not on file   Intimate Partner Violence:     Fear of Current or Ex-Partner: Not on file    Emotionally Abused: Not on file    Physically Abused: Not on file    Sexually Abused: Not on file   Housing Stability:     Unable to Pay for Housing in the Last Year: Not on file    Number of [place] in the Last Year: Not on file    Unstable Housing in the Last Year: Not on file     No current facility-administered medications for this visit. Current Outpatient Medications   Medication Sig Dispense Refill    fluocinolone acetonide (SYNALAR) 0.01 % external solution Apply topically 2 times daily Apply topically 2 times daily. 1 each 0     Facility-Administered Medications Ordered in Other Visits   Medication Dose Route Frequency Provider Last Rate Last Admin    0.9 % sodium chloride infusion   IntraVENous Continuous Oren Son MD        sterile water for irrigation    PRN Oren Son MD   1,000 mL at 05/24/22 0949    simethicone (MYLICON) 40 PQ/4.1KA drops    PRN Oren Son MD   60 mg at 05/24/22 0949     Family History   Problem Relation Age of Onset    Colon Cancer Neg Hx      Past Medical History:   Diagnosis Date    Anxiety     B[facility] ([facility] [address]     Bipolar affective disorder, mixed, severe, with psychotic behavior ([facility] [address] 12/31/2021    Gastric ulcer     Opiate abuse, continuous ([facility] [address]    Lindsay Hernandez Polycystic ovaries    Shamika Romero is a 40 y.o. female evaluated via telephone on 6/10/2022.       Consent:  She and/or health care decision maker is aware that that she may receive a bill for this telephone service, depending on her insurance coverage, and has provided verbal consent to proceed: Yes      Documentation:  I communicated with the patient and/or health care decision maker about . Details of this discussion including any medical advice provided: This visit was a telephone encounter. Patient was located at their home. Provider was located at their ___ home or        ___x_ office. I affirm this is a Patient Initiated Episode with an Established Patient who has not had a related appointment within my department in the past 7 days or scheduled within the next 24 hours. Total Time: minutes: 11-20 minutes    Note: not billable if this call serves to triage the patient into an appointment for the relevant concern      Jyotsna Beatty MD   Objective: There were no vitals taken for this visit. Physical Exam  No exam done -reviewed vitals   Assessment:       Diagnosis Orders   1. Genital herpes simplex, unspecified site  Comprehensive Metabolic Panel   2. Marleni Falcon MD, Neurology, [place]   3.  Chronic nonintractable headache, unspecified headache type  Kurt Gutiérrez MD, Neurology, [place]         Plan:      Orders Placed This Encounter   Procedures    Comprehensive Metabolic Panel     Standing Status:   Future     Standing Expiration Date:   6/10/2023   Kurt Gutiérrez MD, Neurology, [place]     Referral Priority:   Routine     Referral Type:   Eval and Treat     Referral Reason:   Specialty Services Required     Referred to Provider:   Marshall Mace MD     Requested Specialty:   Neurology     Number of Visits Requested:   1     Orders Placed This Encounter   Medications    acyclovir (ZOVIRAX) 400 MG tablet     Sig: Take 1 tablet by mouth 2 times daily     Dispense:  60 tablet     Refill:  3   f/u after non fasting blood work in September

## 2022-06-23 ENCOUNTER — OFFICE VISIT (OUTPATIENT)
Dept: GASTROENTEROLOGY | Age: 37
End: 2022-06-23
Payer: COMMERCIAL

## 2022-06-23 VITALS
WEIGHT: 141.2 LBS | SYSTOLIC BLOOD PRESSURE: 118 MMHG | DIASTOLIC BLOOD PRESSURE: 76 MMHG | BODY MASS INDEX: 24.11 KG/M2 | HEIGHT: 64 IN | TEMPERATURE: 97.7 F

## 2022-06-23 DIAGNOSIS — R19.7 DIARRHEA, UNSPECIFIED TYPE: ICD-10-CM

## 2022-06-23 DIAGNOSIS — Z98.84 HX OF GASTRIC BYPASS: ICD-10-CM

## 2022-06-23 DIAGNOSIS — R10.13 EPIGASTRIC PAIN: ICD-10-CM

## 2022-06-23 DIAGNOSIS — R11.0 NAUSEA: Primary | ICD-10-CM

## 2022-06-23 PROCEDURE — 1036F TOBACCO NON-USER: CPT | Performed by: NURSE PRACTITIONER

## 2022-06-23 PROCEDURE — G8427 DOCREV CUR MEDS BY ELIG CLIN: HCPCS | Performed by: NURSE PRACTITIONER

## 2022-06-23 PROCEDURE — G8420 CALC BMI NORM PARAMETERS: HCPCS | Performed by: NURSE PRACTITIONER

## 2022-06-23 PROCEDURE — 99213 OFFICE O/P EST LOW 20 MIN: CPT | Performed by: NURSE PRACTITIONER

## 2022-06-23 RX ORDER — SODIUM PICOSULFATE, MAGNESIUM OXIDE, AND ANHYDROUS CITRIC ACID 10; 3.5; 12 MG/160ML; G/160ML; G/160ML
LIQUID ORAL
Qty: 320 ML | Refills: 0 | Status: SHIPPED | OUTPATIENT
Start: 2022-06-23

## 2022-06-23 ASSESSMENT — ENCOUNTER SYMPTOMS
CONSTIPATION: 0
SHORTNESS OF BREATH: 0
DIARRHEA: 1
EYE PAIN: 0
BLOOD IN STOOL: 0
VOMITING: 0
COLOR CHANGE: 0
WHEEZING: 0
VOICE CHANGE: 0
ABDOMINAL PAIN: 1
ANAL BLEEDING: 1
CHEST TIGHTNESS: 0
EYE REDNESS: 0
RECTAL PAIN: 0
PHOTOPHOBIA: 0
ABDOMINAL DISTENTION: 0
NAUSEA: 1
TROUBLE SWALLOWING: 0

## 2022-06-23 NOTE — PROGRESS NOTES
Subjective:      Patient ID: Edin Mcnamara is a 40 y.o. female who presents today for:  Chief Complaint   Patient presents with    Follow-up       HPI  Patient came in as follow-up and further management, was initially seen for abdominal pain and diarrhea and rectal bleeding. She was scheduled for EGD and colonoscopy however they were unable to obtain IV access. Has persistent symptoms which include nausea, abdominal pain, diarrhea and she would like to proceed with EGD and colonoscopy. Otherwise no new complaints or concerns. Background  This is a very pleasant 81-year-old who came in today for further evaluation management. Patient mentioned that she has gastric bypass years back at [facility] and subsequently she had an ulcer she mentioned she was scoped previously and was found to have also been maintained on Prilosec. She described abdominal pain periumbilical area as well as epigastric area. Pain has been for the last 6 months intermittent in nature. She does also endorse loose stool intermittently. Had an episode blood per rectum almost a month ago that resolved. No nausea vomiting reported. No diarrhea per se reported today. Patient came in today for the evaluation management of abdominal pain. Pain is not necessarily related to diet. She was prescribed probiotic with improvement of symptoms.   Otherwise patient came in today to establish care and for diabetes management  Past Medical History:   Diagnosis Date    Anxiety     Bipolar 1 disorder ([address]     Bipolar affective disorder, mixed, severe, with psychotic behavior ([address] 12/31/2021    Gastric ulcer     Opiate abuse, continuous ([address]     Polycystic ovaries      Past Surgical History:   Procedure Laterality Date    CHOLECYSTECTOMY      COSMETIC SURGERY      GASTRIC BYPASS SURGERY      THYROID SURGERY       Social History     Socioeconomic History    Marital status: Single     Spouse name: Not on file    Number of children: Not on file    Years of education: Not on file    Highest education level: Not on file   Occupational History    Not on file   Tobacco Use    Smoking status: Never Smoker    Smokeless tobacco: Never Used   Vaping Use    Vaping Use: Some days    Substances: Nicotine    Devices: Pre-filled or refillable cartridge   Substance and Sexual Activity    Alcohol use: No    Drug use: Not Currently     Types: Opiates      Comment: pt was on prescription pain medication    Sexual activity: Not on file   Other Topics Concern    Not on file   Social History Narrative    Not on file     Social Determinants of Health     Financial Resource Strain: Low Risk     Difficulty of Paying Living Expenses: Not hard at all   Food Insecurity: No Food Insecurity    Worried About Running Out of Food in the Last Year: Never true    Kristopher of Food in the Last Year: Never true   Transportation Needs:     Lack of Transportation (Medical): Not on file    Lack of Transportation (Non-Medical):  Not on file   Physical Activity:     Days of Exercise per Week: Not on file    Minutes of Exercise per Session: Not on file   Stress:     Feeling of Stress : Not on file   Social Connections:     Frequency of Communication with Friends and Family: Not on file    Frequency of Social Gatherings with Friends and Family: Not on file    Attends Protestant Services: Not on file    Active Member of [address] [place] [address] [employer] or Organizations: Not on file    Attends Club or Organization Meetings: Not on file    Marital Status: Not on file   Intimate Partner Violence:     Fear of Current or Ex-Partner: Not on file    Emotionally Abused: Not on file    Physically Abused: Not on file    Sexually Abused: Not on file   Housing Stability:     Unable to Pay for Housing in the Last Year: Not on file    Number of [place] in the Last Year: Not on file    Unstable Housing in the Last Year: Not on file     Family History   Problem Relation Age of Onset    Colon Cancer Neg Hx      Allergies   Allergen Reactions    Bee Venom      Other reaction(s): Anaphylactic Shock    Other      Other reaction(s): anaphylaxis         Review of Systems   Constitutional: Negative for appetite change, chills, fever and unexpected weight change. HENT: Negative for nosebleeds, tinnitus, trouble swallowing and voice change. Eyes: Negative for photophobia, pain and redness. Respiratory: Negative for chest tightness, shortness of breath and wheezing. Cardiovascular: Negative for chest pain, palpitations and leg swelling. Gastrointestinal: Positive for abdominal pain, anal bleeding, diarrhea and nausea. Negative for abdominal distention, blood in stool, constipation, rectal pain and vomiting. Endocrine: Negative for polydipsia, polyphagia and polyuria. Genitourinary: Negative for difficulty urinating and hematuria. Skin: Negative for color change, pallor and rash. Neurological: Negative for dizziness, speech difficulty and headaches. Psychiatric/Behavioral: Negative for confusion and suicidal ideas. Objective:   /76   Temp 97.7 °F (36.5 °C) (Temporal)   Ht 5' 4\" (1.626 m)   Wt 141 lb 3.2 oz (64 kg)   BMI 24.24 kg/m²     Physical Exam  Vitals reviewed. Constitutional:       General: She is not in acute distress. Appearance: Normal appearance. She is well-developed and well-groomed. HENT:      Head: Normocephalic and atraumatic. Nose: Nose normal.   Eyes:      General: No scleral icterus. Extraocular Movements: Extraocular movements intact. Conjunctiva/sclera: Conjunctivae normal.      Pupils: Pupils are equal, round, and reactive to light. Cardiovascular:      Rate and Rhythm: Normal rate and regular rhythm. Pulses: Normal pulses. Heart sounds: Normal heart sounds. Pulmonary:      Effort: Pulmonary effort is normal. No respiratory distress. Breath sounds: Normal breath sounds. No wheezing or rales.    Abdominal: General: Abdomen is flat. Bowel sounds are normal. There is no distension. Palpations: Abdomen is soft. There is no hepatomegaly, splenomegaly or mass. Tenderness: There is no abdominal tenderness. There is no guarding or rebound. Musculoskeletal:         General: No tenderness or deformity. Normal range of motion. Cervical back: Neck supple. Right lower leg: No edema. Left lower leg: No edema. Skin:     General: Skin is warm and dry. Capillary Refill: Capillary refill takes less than 2 seconds. Coloration: Skin is not jaundiced. Findings: No erythema or rash. Neurological:      General: No focal deficit present. Mental Status: She is alert and oriented to person, place, and time. Psychiatric:         Mood and Affect: Mood normal.         Behavior: Behavior normal.         Laboratory, Pathology, Radiology reviewed in detail with relevantimportant investigations summarized below:  Lab Results   Component Value Date    WBC 7.5 02/19/2019    WBC 8.2 10/24/2016    HGB 12.3 02/19/2019    HGB 13.7 10/24/2016    HCT 36.7 02/19/2019    HCT 40.6 10/24/2016    MCV 86.2 02/19/2019    MCV 90.7 10/24/2016     02/19/2019     10/24/2016    . Lab Results   Component Value Date    ALT 9 01/03/2022    ALT 12 02/19/2019    ALT 37 10/24/2016    AST 11 01/03/2022    AST 20 02/19/2019    AST 23 10/24/2016    ALKPHOS 56 01/03/2022    ALKPHOS 82 02/19/2019    ALKPHOS 89 10/24/2016    BILITOT <0.15 01/03/2022    BILITOT <0.2 02/19/2019    BILITOT 0.48 10/24/2016       No results found. No results found for: IRON, TIBC, FERRITIN  Lab Results   Component Value Date    INR 1.0 02/19/2019     No components found for: ACUTEHEPATITISSCREEN  No components found for: CELIACPANEL  No components found for: STOOLCULTURE, C.DIFF, STOOLOVAPARASITE, STOOLLEUCOCYTE        Assessment:       Diagnosis Orders   1. Nausea     2. Epigastric pain     3.  Diarrhea, unspecified type Plan:      1- Abdominal pain, nausea, diarrhea  Was previously scheduled for EGD and colonoscopy however this was canceled due to inability to gain IV access. Patient mentions that she been on PPI for gastric ulcer? That was noted after the gastric bypass surgery remote marginal ulcer. Patient described epigastric and periumbilical pain.  -At this time, will proceed upper endoscopy for further evaluation. Will obtain gastric biopsies to assess for H. Pylori  In addition pt reports diarrhea and episode of blood per rectum     -Will obtain colonoscopy with terminal ileum intubation for comprehensive assessment. Noted patient does have a history of gastric bypass and will require small volume prep. Patient also mentioned that she had extensive negative work-up at [facility].  -will coordinate for Midline cath to be placed on the day of procedure   2- Associated medical conditions including but not limited to history of opioid abuse-will assess lab work upon obtaining from [facility], anxiety/bipolar disorder, polycystic ovaries, history of gastric bypass    Return in about 3 months (around 9/23/2022), or if symptoms worsen or fail to improve.       Nany Cervantes, SILVANO - CNP

## 2022-06-29 RX ORDER — SODIUM CHLORIDE, SODIUM LACTATE, POTASSIUM CHLORIDE, CALCIUM CHLORIDE 600; 310; 30; 20 MG/100ML; MG/100ML; MG/100ML; MG/100ML
INJECTION, SOLUTION INTRAVENOUS
Status: DISCONTINUED
Start: 2022-06-29 | End: 2022-06-29 | Stop reason: WASHOUT

## 2022-07-06 ENCOUNTER — TELEPHONE (OUTPATIENT)
Dept: GASTROENTEROLOGY | Age: 37
End: 2022-07-06

## 2022-07-06 DIAGNOSIS — R10.84 GENERALIZED ABDOMINAL PAIN: Primary | ICD-10-CM

## 2022-08-02 RX ORDER — FLUOCINOLONE ACETONIDE 0.1 MG/ML
SOLUTION TOPICAL 2 TIMES DAILY
Qty: 1 EACH | Refills: 0 | Status: SHIPPED | OUTPATIENT
Start: 2022-08-02

## 2022-08-05 NOTE — TELEPHONE ENCOUNTER
----- Message from Estefani Richardson sent at 7/20/2021  2:31 PM EDT -----  Subject: Results Request    QUESTIONS  Which lab or imaging result is the patient calling about? XR KNEE RIGHT (3   VIEWS) and labs   Which provider ordered the test? [address]   At what location was the test performed? Date the test was performed? Additional Information for Provider? Pt had xrasys and labs drawn @ [facility] and wants someone to call and explain to her the results of   both. She also states that her pcp said if her lab results were normal he   would prescribe her new meds so she also has questions about.   ---------------------------------------------------------------------------  --------------  CALL BACK INFO  What is the best way for the office to contact you? OK to leave message on   voicemail  Preferred Call Back Phone Number?  [phone] regular

## 2022-09-07 ENCOUNTER — TELEMEDICINE (OUTPATIENT)
Dept: FAMILY MEDICINE CLINIC | Age: 37
End: 2022-09-07

## 2022-09-07 DIAGNOSIS — F48.9 NO DIAGNOSIS ON AXIS I: Primary | ICD-10-CM

## 2022-09-07 PROCEDURE — 99999 PR OFFICE/OUTPT VISIT,PROCEDURE ONLY: CPT | Performed by: FAMILY MEDICINE

## 2022-09-07 RX ORDER — FERROUS SULFATE 325(65) MG
325 TABLET ORAL
Qty: 30 TABLET | Refills: 1 | Status: SHIPPED | OUTPATIENT
Start: 2022-09-07

## 2022-09-07 RX ORDER — ACYCLOVIR 400 MG/1
400 TABLET ORAL 2 TIMES DAILY
Qty: 60 TABLET | Refills: 1 | Status: SHIPPED | OUTPATIENT
Start: 2022-09-07 | End: 2022-10-07

## 2022-10-06 ENCOUNTER — PATIENT MESSAGE (OUTPATIENT)
Dept: FAMILY MEDICINE CLINIC | Age: 37
End: 2022-10-06

## 2022-10-07 NOTE — TELEPHONE ENCOUNTER
From: Lillian Osborne  To: Dr. Ramesh Hernandez  Sent: 10/6/2022 11:58 AM EDT  Subject: Refill     Good morning. May i please have refills on my medication? I need , acyclovir 400 MG tablet 2x a day refilled. Can someone please let me know if the doctor is going to send it to the pharmacy? Thanks.

## 2022-10-08 RX ORDER — ACYCLOVIR 400 MG/1
400 TABLET ORAL 2 TIMES DAILY
Qty: 60 TABLET | Refills: 0 | Status: SHIPPED | OUTPATIENT
Start: 2022-10-08 | End: 2022-11-04

## 2022-11-04 RX ORDER — ACYCLOVIR 400 MG/1
TABLET ORAL
Qty: 60 TABLET | Refills: 0 | Status: SHIPPED | OUTPATIENT
Start: 2022-11-04

## 2022-11-04 NOTE — TELEPHONE ENCOUNTER
requesting medication refill. Please approve or deny this request.    Rx requested:  Requested Prescriptions     Pending Prescriptions Disp Refills    acyclovir (ZOVIRAX) 400 MG tablet [Pharmacy Med Name: ACYCLOVIR 400 MG TABLET] 60 tablet 0     Sig: TAKE 1 TABLET BY MOUTH TWICE A DAY         Last Office Visit:   9/7/2022      Next Visit Date:  No future appointments.

## 2022-11-18 RX ORDER — FERROUS SULFATE 325(65) MG
TABLET ORAL
Qty: 30 TABLET | Refills: 1 | Status: SHIPPED | OUTPATIENT
Start: 2022-11-18

## 2022-11-28 ENCOUNTER — TELEPHONE (OUTPATIENT)
Dept: FAMILY MEDICINE CLINIC | Age: 37
End: 2022-11-28

## 2022-11-28 NOTE — TELEPHONE ENCOUNTER
[address] [place] [address] Clinical Staff  Subject: Refill Request     QUESTIONS   Name of Medication? levothyroxine (SYNTHROID) 50 MCG tablet   Patient-reported dosage and instructions? 50mcg 1 x a day   How many days do you have left? 1   Preferred Pharmacy? [address] [place] [address] #[id number]   Pharmacy phone number (if available)? [phone]   ---------------------------------------------------------------------------   --------------,   Name of Medication? acyclovir (ZOVIRAX) 400 MG tablet   Patient-reported dosage and instructions? 400mg 2 x a day   How many days do you have left? 0   Preferred Pharmacy? [address] [place] [address] #[id number]   Pharmacy phone number (if available)? [phone]   ---------------------------------------------------------------------------   --------------,   Name of Medication? Cholecalciferol (VITAMIN D3) 1.25 MG (90499 UT) CAPS   Patient-reported dosage and instructions? 1.25mg 1 x a week   How many days do you have left? 0   Preferred Pharmacy? [address] [place] [address] #[id number]   Pharmacy phone number (if available)? [phone]   Additional Information for Provider? Please call once these are sent.   ---------------------------------------------------------------------------   --------------   CALL BACK INFO   What is the best way for the office to contact you? OK to leave message on   voicemail   Preferred Call Back Phone Number? [phone]   ---------------------------------------------------------------------------   --------------   SCRIPT ANSWERS   Relationship to Patient?  Self

## 2022-11-28 NOTE — TELEPHONE ENCOUNTER
[address] [place] [address] Clinical Staff  Subject: Refill Request     QUESTIONS   Name of Medication? levothyroxine (SYNTHROID) 50 MCG tablet   Patient-reported dosage and instructions? 50mcg 1 x a day   How many days do you have left? 1   Preferred Pharmacy? [address] [place] [address] #[id number]   Pharmacy phone number (if available)? [phone]   ---------------------------------------------------------------------------   --------------,   Name of Medication? acyclovir (ZOVIRAX) 400 MG tablet   Patient-reported dosage and instructions? 400mg 2 x a day   How many days do you have left? 0   Preferred Pharmacy? [address] [place] [address] #[id number]   Pharmacy phone number (if available)? [phone]   ---------------------------------------------------------------------------   --------------,   Name of Medication? Cholecalciferol (VITAMIN D3) 1.25 MG (22786 UT) CAPS   Patient-reported dosage and instructions? 1.25mg 1 x a week   How many days do you have left? 0   Preferred Pharmacy? [address] [place] [address] #[id number]   Pharmacy phone number (if available)? [phone]   Additional Information for Provider? Please call once these are sent.   ---------------------------------------------------------------------------   --------------   CALL BACK INFO   What is the best way for the office to contact you? OK to leave message on   voicemail   Preferred Call Back Phone Number? [phone]   ---------------------------------------------------------------------------   --------------   SCRIPT ANSWERS   Relationship to Patient?  Self

## 2022-12-01 ENCOUNTER — HOSPITAL ENCOUNTER (OUTPATIENT)
Dept: LAB | Age: 37
Discharge: HOME | End: 2022-12-01
Payer: COMMERCIAL

## 2022-12-01 DIAGNOSIS — A60.00 GENITAL HERPES SIMPLEX, UNSPECIFIED SITE: ICD-10-CM

## 2022-12-01 LAB
ALBUMIN SERPL-MCNC: 4.1 G/DL (ref 3.5–4.6)
ALP BLD-CCNC: 70 U/L (ref 40–130)
ALT SERPL-CCNC: 20 U/L (ref 0–33)
ANION GAP SERPL CALCULATED.3IONS-SCNC: 8 MEQ/L (ref 9–15)
AST SERPL-CCNC: 24 U/L (ref 0–35)
BILIRUB SERPL-MCNC: 0.5 MG/DL (ref 0.2–0.7)
BUN BLDV-MCNC: 7 MG/DL (ref 6–20)
CALCIUM SERPL-MCNC: 9.6 MG/DL (ref 8.5–9.9)
CHLORIDE BLD-SCNC: 100 MEQ/L (ref 95–107)
CO2: 26 MEQ/L (ref 20–31)
CREAT SERPL-MCNC: 0.66 MG/DL (ref 0.5–0.9)
GFR SERPL CREATININE-BSD FRML MDRD: >60 ML/MIN/{1.73_M2}
GLOBULIN: 2.4 G/DL (ref 2.3–3.5)
GLUCOSE BLD-MCNC: 90 MG/DL (ref 70–99)
POTASSIUM SERPL-SCNC: 4.6 MEQ/L (ref 3.4–4.9)
SODIUM BLD-SCNC: 134 MEQ/L (ref 135–144)
TOTAL PROTEIN: 6.5 G/DL (ref 6.3–8)

## 2022-12-01 PROCEDURE — 80053 COMPREHEN METABOLIC PANEL: CPT

## 2022-12-01 PROCEDURE — 36415 COLL VENOUS BLD VENIPUNCTURE: CPT

## 2022-12-09 ENCOUNTER — TELEMEDICINE (OUTPATIENT)
Dept: FAMILY MEDICINE CLINIC | Age: 37
End: 2022-12-09
Payer: COMMERCIAL

## 2022-12-09 DIAGNOSIS — Z85.850 HISTORY OF THYROID CANCER: ICD-10-CM

## 2022-12-09 DIAGNOSIS — R63.5 WEIGHT GAIN: Primary | ICD-10-CM

## 2022-12-09 DIAGNOSIS — T63.441A ALLERGIC REACTION TO BEE STING: ICD-10-CM

## 2022-12-09 DIAGNOSIS — D64.9 ANEMIA, UNSPECIFIED TYPE: ICD-10-CM

## 2022-12-09 DIAGNOSIS — A60.00 GENITAL HERPES SIMPLEX, UNSPECIFIED SITE: ICD-10-CM

## 2022-12-09 DIAGNOSIS — E03.8 OTHER SPECIFIED HYPOTHYROIDISM: ICD-10-CM

## 2022-12-09 PROCEDURE — 99442 PR PHYS/QHP TELEPHONE EVALUATION 11-20 MIN: CPT | Performed by: FAMILY MEDICINE

## 2022-12-09 RX ORDER — ACYCLOVIR 400 MG/1
TABLET ORAL
Qty: 60 TABLET | Refills: 5 | Status: SHIPPED | OUTPATIENT
Start: 2022-12-09

## 2022-12-09 RX ORDER — EPINEPHRINE 0.3 MG/.3ML
INJECTION SUBCUTANEOUS
Qty: 1 EACH | Refills: 1 | Status: SHIPPED | OUTPATIENT
Start: 2022-12-09

## 2022-12-09 RX ORDER — OMEPRAZOLE 20 MG/1
20 CAPSULE, DELAYED RELEASE ORAL DAILY
Qty: 30 CAPSULE | Refills: 1 | Status: SHIPPED | OUTPATIENT
Start: 2022-12-09

## 2022-12-09 RX ORDER — LEVOTHYROXINE SODIUM 0.05 MG/1
50 TABLET ORAL DAILY
Qty: 30 TABLET | Refills: 0 | Status: CANCELLED | OUTPATIENT
Start: 2022-12-09

## 2022-12-09 SDOH — ECONOMIC STABILITY: FOOD INSECURITY: WITHIN THE PAST 12 MONTHS, YOU WORRIED THAT YOUR FOOD WOULD RUN OUT BEFORE YOU GOT MONEY TO BUY MORE.: NEVER TRUE

## 2022-12-09 SDOH — ECONOMIC STABILITY: FOOD INSECURITY: WITHIN THE PAST 12 MONTHS, THE FOOD YOU BOUGHT JUST DIDN'T LAST AND YOU DIDN'T HAVE MONEY TO GET MORE.: NEVER TRUE

## 2022-12-09 ASSESSMENT — SOCIAL DETERMINANTS OF HEALTH (SDOH): HOW HARD IS IT FOR YOU TO PAY FOR THE VERY BASICS LIKE FOOD, HOUSING, MEDICAL CARE, AND HEATING?: NOT HARD AT ALL

## 2022-12-09 NOTE — PROGRESS NOTES
Subjective:      Patient ID: Steve Aranda is a 40 y.o. female    HPI  Here in follow up of sorts. Has had 20 pound weight gain recently. Hx of thyroid cancer-getting synthroid thru gyn. No genital herpes outbreaks since last visit. Hx of anemia-still taking iron supplement -would like refill on epi pen for bee allergy. Still trying to get egd and colonoscopy scheduled -would like refill on prilosec until seen by gi    Review of Systems   Constitutional:  Positive for unexpected weight change. Skin:  Negative for rash. Neurological:  Negative for dizziness. Reviewed allergy, medical, social, surgical, family and med list changes and updated   Files--reviewed limited blood work-stable    Steve Aranda is a 40 y.o. female evaluated via telephone on 12/9/2022 for Hypothyroidism and Weight Gain  . Documentation:  I communicated with the patient and/or health care decision maker about . Details of this discussion including any medical advice provided: Total Time: minutes: 11-20 minutes    Steve Aranda was evaluated through a synchronous (real-time) audio encounter. Patient identification was verified at the start of the visit. She (or guardian if applicable) is aware that this is a billable service, which includes applicable co-pays. This visit was conducted with the patient's (and/or legal guardian's) verbal consent. She has not had a related appointment within my department in the past 7 days or scheduled within the next 24 hours. The patient was located at Home: [place] [address]. The provider was located at [facility] (Appt Dept): [address] [place] [address].     Note: not billable if this call serves to triage the patient into an appointment for the relevant concern    Geetha Enamorado MD     Social History     Socioeconomic History    Marital status: Single   Tobacco Use    Smoking status: Never    Smokeless tobacco: Never   Vaping Use    Vaping Use: Some days    Substances: Nicotine    Devices: Pre-filled or refillable cartridge   Substance and Sexual Activity    Alcohol use: No    Drug use: Not Currently     Types: Opiates      Comment: pt was on prescription pain medication     Social Determinants of Health     Financial Resource Strain: Low Risk     Difficulty of Paying Living Expenses: Not hard at all   Food Insecurity: No Food Insecurity    Worried About Running Out of Food in the Last Year: Never true    Ran Out of Food in the Last Year: Never true     No current facility-administered medications for this visit. Current Outpatient Medications   Medication Sig Dispense Refill    acyclovir (ZOVIRAX) 400 MG tablet TAKE 1 TABLET BY MOUTH TWICE A DAY 60 tablet 5    omeprazole (PRILOSEC) 20 MG delayed release capsule Take 1 capsule by mouth Daily 30 capsule 1    EPINEPHrine (EPIPEN 2-SANTHOSH) 0.3 MG/0.3ML SOAJ injection Use as directed for allergic reaction 1 each 1    FEROSUL 325 (65 Fe) MG tablet take 1 tablet by mouth once daily with breakfast 30 tablet 1    fluocinolone acetonide (SYNALAR) 0.01 % external solution Apply topically 2 times daily Apply topically 2 times daily.  1 each 0    Sod Picosulfate-Mag Ox-Cit Acd (CLENPIQ) 10-3.5-12 MG-GM -GM/160ML SOLN solution Take as directed 320 mL 0     Facility-Administered Medications Ordered in Other Visits   Medication Dose Route Frequency Provider Last Rate Last Admin    0.9 % sodium chloride infusion   IntraVENous Continuous Marc Waite MD        sterile water for irrigation    PRN Marc Waite MD   1,000 mL at 05/24/22 0949    simethicone (MYLICON) 40 ZV/0.6WY drops    PRN Marc Waite MD   60 mg at 05/24/22 7910     Family History   Problem Relation Age of Onset    Colon Cancer Neg Hx      Past Medical History:   Diagnosis Date    Anxiety     Bipolar 1 disorder ([facility]     Bipolar affective disorder, mixed, severe, with psychotic behavior ([facility] 12/31/2021    Gastric ulcer     Opiate abuse, continuous ([facility]     Polycystic ovaries       Social History     Socioeconomic History    Marital status: Single   Tobacco Use    Smoking status: Never    Smokeless tobacco: Never   Vaping Use    Vaping Use: Some days    Substances: Nicotine    Devices: Pre-filled or refillable cartridge   Substance and Sexual Activity    Alcohol use: No    Drug use: Not Currently     Types: Opiates      Comment: pt was on prescription pain medication     Social Determinants of Health     Financial Resource Strain: Low Risk     Difficulty of Paying Living Expenses: Not hard at all   Food Insecurity: No Food Insecurity    Worried About Running Out of Food in the Last Year: Never true    Ran Out of Food in the Last Year: Never true     No current facility-administered medications for this visit. Current Outpatient Medications   Medication Sig Dispense Refill    FEROSUL 325 (65 Fe) MG tablet take 1 tablet by mouth once daily with breakfast 30 tablet 1    acyclovir (ZOVIRAX) 400 MG tablet TAKE 1 TABLET BY MOUTH TWICE A DAY 60 tablet 0    fluocinolone acetonide (SYNALAR) 0.01 % external solution Apply topically 2 times daily Apply topically 2 times daily.  1 each 0    Sod Picosulfate-Mag Ox-Cit Acd (CLENPIQ) 10-3.5-12 MG-GM -GM/160ML SOLN solution Take as directed 320 mL 0     Facility-Administered Medications Ordered in Other Visits   Medication Dose Route Frequency Provider Last Rate Last Admin    0.9 % sodium chloride infusion   IntraVENous Continuous Jb Pulido MD        sterile water for irrigation    PRN Jb Pulido MD   1,000 mL at 05/24/22 0949    simethicone (MYLICON) 40 EB/8.8WA drops    PRN Jb Pulido MD   60 mg at 05/24/22 6081     Family History   Problem Relation Age of Onset    Colon Cancer Neg Hx      Past Medical History:   Diagnosis Date    Anxiety     Bipolar 1 disorder ([facility]     Bipolar affective disorder, mixed, severe, with psychotic behavior ([facility] 12/31/2021    Gastric ulcer     Opiate abuse,

## 2023-01-12 RX ORDER — ACYCLOVIR 400 MG/1
TABLET ORAL
Qty: 60 TABLET | Refills: 0 | Status: SHIPPED | OUTPATIENT
Start: 2023-01-12

## 2023-01-12 RX ORDER — FERROUS SULFATE 325(65) MG
325 TABLET ORAL
Qty: 30 TABLET | Refills: 0 | Status: SHIPPED | OUTPATIENT
Start: 2023-01-12

## 2023-01-26 RX ORDER — ACYCLOVIR 400 MG/1
TABLET ORAL
Qty: 60 TABLET | Refills: 0 | Status: SHIPPED | OUTPATIENT
Start: 2023-01-26

## 2023-01-27 RX ORDER — FERROUS SULFATE 325(65) MG
325 TABLET ORAL
Qty: 30 TABLET | Refills: 0 | Status: SHIPPED | OUTPATIENT
Start: 2023-01-27 | End: 2023-02-13

## 2023-01-27 RX ORDER — OMEPRAZOLE 20 MG/1
20 CAPSULE, DELAYED RELEASE ORAL DAILY
Qty: 30 CAPSULE | Refills: 1 | Status: SHIPPED | OUTPATIENT
Start: 2023-01-27 | End: 2023-02-27

## 2023-01-27 RX ORDER — EPINEPHRINE 0.3 MG/.3ML
INJECTION SUBCUTANEOUS
Qty: 1 EACH | Refills: 1 | Status: SHIPPED | OUTPATIENT
Start: 2023-01-27 | End: 2023-03-03 | Stop reason: SDUPTHER

## 2023-01-27 RX ORDER — OXYBUTYNIN CHLORIDE 5 MG/1
5 TABLET, EXTENDED RELEASE ORAL 2 TIMES DAILY
Qty: 30 TABLET | OUTPATIENT
Start: 2023-01-27

## 2023-01-27 NOTE — TELEPHONE ENCOUNTER
Patient requesting medication refill.  Please approve or deny this request.    Rx requested:  Requested Prescriptions     Pending Prescriptions Disp Refills    EPINEPHrine (EPIPEN 2-SANTHOSH) 0.3 MG/0.3ML SOAJ injection 1 each 1     Sig: Use as directed for allergic reaction    ferrous sulfate (FEROSUL) 325 (65 Fe) MG tablet 30 tablet 0     Sig: Take 1 tablet by mouth daily (with breakfast)    omeprazole (PRILOSEC) 20 MG delayed release capsule 30 capsule 1     Sig: Take 1 capsule by mouth Daily    oxybutynin (DITROPAN-XL) 5 MG extended release tablet 30 tablet      Sig: Take 1 tablet by mouth 2 times daily         Last Office Visit:   12/9/2022      Next Visit Date:  Future Appointments   Date Time Provider Ronan Kulkarni   2/8/2023  3:45 PM Anant Celinda Bamberger, MD [facility]

## 2023-02-03 RX ORDER — OMEPRAZOLE 20 MG/1
CAPSULE, DELAYED RELEASE ORAL
Qty: 30 CAPSULE | Refills: 1 | Status: SHIPPED | OUTPATIENT
Start: 2023-02-03

## 2023-02-03 NOTE — TELEPHONE ENCOUNTER
Pharmacy requesting medication refill.  Please approve or deny this request.    Rx requested:  Requested Prescriptions     Pending Prescriptions Disp Refills    omeprazole (PRILOSEC) 20 MG delayed release capsule [Pharmacy Med Name: OMEPRAZOLE DR 20 MG CAPSULE] 30 capsule 1     Sig: TAKE 1 CAPSULE BY MOUTH EVERY DAY         Last Office Visit:   12/9/2022      Next Visit Date:  Future Appointments   Date Time Provider Ronan Kulkarni   2/8/2023  3:45 PM Frankie Elizondo MD [facility]

## 2023-02-09 RX ORDER — EPINEPHRINE 0.3 MG/.3ML
INJECTION SUBCUTANEOUS
Refills: 10 | OUTPATIENT
Start: 2023-02-09

## 2023-02-12 NOTE — TELEPHONE ENCOUNTER
Pharmacy requesting medication refill. Please approve or deny this request.    Rx requested:  Requested Prescriptions     Pending Prescriptions Disp Refills    FEROSUL 325 (65 Fe) MG tablet [Pharmacy Med Name: Juanjo Larioss 325 MG TABLET] 30 tablet 0     Sig: take 1 tablet by mouth once daily WITH BREAKFAST         Last Office Visit:   12/09/2022      Next Visit Date:  No future appointments.

## 2023-02-13 RX ORDER — FERROUS SULFATE 325(65) MG
TABLET ORAL
Qty: 90 TABLET | Refills: 1 | OUTPATIENT
Start: 2023-02-13

## 2023-02-13 RX ORDER — FERROUS SULFATE 325(65) MG
TABLET ORAL
Qty: 30 TABLET | Refills: 0 | Status: SHIPPED | OUTPATIENT
Start: 2023-02-13

## 2023-02-14 ENCOUNTER — TELEPHONE (OUTPATIENT)
Dept: GASTROENTEROLOGY | Age: 38
End: 2023-02-14

## 2023-02-14 NOTE — TELEPHONE ENCOUNTER
The patient needs to schedule for an EGD and COLON, the orders are . The prep she took for the last procedure was Suprep, she mentioned that trilyte would not be ideal because she had gastric bypass and she can not drink all the liquid in a timely manner. The Pharmacy she needs it sent to is : [facility]: [address]     The patient will also need an order for the Mid-Line due small veins.

## 2023-02-15 RX ORDER — EPINEPHRINE 0.3 MG/.3ML
INJECTION SUBCUTANEOUS
Refills: 10 | OUTPATIENT
Start: 2023-02-15

## 2023-02-20 RX ORDER — FERROUS SULFATE 325(65) MG
TABLET ORAL
Qty: 30 TABLET | Refills: 10 | OUTPATIENT
Start: 2023-02-20

## 2023-02-20 RX ORDER — OMEPRAZOLE 20 MG/1
20 CAPSULE, DELAYED RELEASE ORAL DAILY
Qty: 30 CAPSULE | Refills: 10 | OUTPATIENT
Start: 2023-02-20

## 2023-02-21 RX ORDER — EPINEPHRINE 0.3 MG/.3ML
INJECTION SUBCUTANEOUS
Qty: 0.3 ML | Refills: 1 | OUTPATIENT
Start: 2023-02-21

## 2023-02-21 NOTE — TELEPHONE ENCOUNTER
Future Appointments    This patient does not currently have any appointments scheduled.   Past Visits    Date Provider Specialty Visit Type Primary Dx   12/09/2022 Ye Rikc MD Family Medicine Telemedicine Weight gain

## 2023-02-27 RX ORDER — EPINEPHRINE 0.3 MG/.3ML
INJECTION SUBCUTANEOUS
Refills: 10 | OUTPATIENT
Start: 2023-02-27

## 2023-02-27 RX ORDER — OMEPRAZOLE 20 MG/1
20 CAPSULE, DELAYED RELEASE ORAL DAILY
Qty: 30 CAPSULE | Refills: 0 | Status: SHIPPED | OUTPATIENT
Start: 2023-02-27

## 2023-03-03 RX ORDER — EPINEPHRINE 0.3 MG/.3ML
INJECTION SUBCUTANEOUS
Qty: 2 EACH | Refills: 0 | Status: SHIPPED | OUTPATIENT
Start: 2023-03-03

## 2023-03-03 NOTE — TELEPHONE ENCOUNTER
[facility] called and is asking for a mail away amount on the patients epiepen  Please verify how many pens (2 renetta) with refills need to be filled in

## 2023-03-10 RX ORDER — OMEPRAZOLE 20 MG/1
20 CAPSULE, DELAYED RELEASE ORAL DAILY
Qty: 30 CAPSULE | Refills: 0 | Status: SHIPPED | OUTPATIENT
Start: 2023-03-10 | End: 2023-03-28 | Stop reason: SDUPTHER

## 2023-03-10 RX ORDER — FERROUS SULFATE 325(65) MG
TABLET ORAL
Qty: 30 TABLET | Refills: 1 | Status: SHIPPED | OUTPATIENT
Start: 2023-03-10 | End: 2023-03-28 | Stop reason: SDUPTHER

## 2023-03-10 RX ORDER — EPINEPHRINE 0.3 MG/.3ML
INJECTION SUBCUTANEOUS
Refills: 10 | OUTPATIENT
Start: 2023-03-10

## 2023-03-16 RX ORDER — EPINEPHRINE 0.3 MG/.3ML
INJECTION SUBCUTANEOUS
Refills: 10 | OUTPATIENT
Start: 2023-03-16

## 2023-03-22 RX ORDER — EPINEPHRINE 0.3 MG/.3ML
INJECTION SUBCUTANEOUS
Qty: 2 EACH | Refills: 0 | OUTPATIENT
Start: 2023-03-22

## 2023-03-22 NOTE — TELEPHONE ENCOUNTER
Future Appointments    This patient does not currently have any appointments scheduled.   Past Visits    Date Provider Specialty Visit Type Primary Dx   12/09/2022 Ngoc Sandoval MD Family Medicine Telemedicine Weight gain

## 2023-03-28 RX ORDER — OMEPRAZOLE 20 MG/1
20 CAPSULE, DELAYED RELEASE ORAL DAILY
Qty: 30 CAPSULE | Refills: 0 | Status: SHIPPED | OUTPATIENT
Start: 2023-03-28

## 2023-03-28 RX ORDER — FERROUS SULFATE 325(65) MG
1 TABLET ORAL
Qty: 30 TABLET | Refills: 0 | Status: SHIPPED | OUTPATIENT
Start: 2023-03-28

## 2023-03-28 RX ORDER — EPINEPHRINE 0.3 MG/.3ML
INJECTION SUBCUTANEOUS
Refills: 10 | OUTPATIENT
Start: 2023-03-28

## 2023-03-28 NOTE — TELEPHONE ENCOUNTER
Comments: This request is coming from the patient. Last Office Visit (last PCP visit):   12/9/2022    Next Visit Date:  No future appointments. If hasn't been seen in over a year OR hasn't followed up according to last diabetes/ADHD visit, make appointment for patient before sending refill to provider.     Rx requested:  Requested Prescriptions     Pending Prescriptions Disp Refills    ferrous sulfate (FEROSUL) 325 (65 Fe) MG tablet 30 tablet 1     Sig: Take 1 tablet by mouth daily (with breakfast)    omeprazole (PRILOSEC) 20 MG delayed release capsule 30 capsule 0     Sig: Take 1 capsule by mouth Daily

## 2023-03-28 NOTE — TELEPHONE ENCOUNTER
Called PT and let her know she needs to schedule an appt to get further refills.  She is going to check her calander and call back to make an appt

## 2023-04-07 RX ORDER — EPINEPHRINE 0.3 MG/.3ML
INJECTION SUBCUTANEOUS
Refills: 10 | OUTPATIENT
Start: 2023-04-07

## 2023-04-25 ENCOUNTER — HOSPITAL ENCOUNTER (EMERGENCY)
Age: 38
Discharge: HOME | End: 2023-04-25
Attending: EMERGENCY MEDICINE
Payer: COMMERCIAL

## 2023-04-25 VITALS
BODY MASS INDEX: 31.24 KG/M2 | HEART RATE: 95 BPM | HEIGHT: 64 IN | WEIGHT: 183 LBS | DIASTOLIC BLOOD PRESSURE: 72 MMHG | TEMPERATURE: 97.9 F | OXYGEN SATURATION: 99 % | SYSTOLIC BLOOD PRESSURE: 115 MMHG | RESPIRATION RATE: 16 BRPM

## 2023-04-25 DIAGNOSIS — J20.9 ACUTE BRONCHITIS, UNSPECIFIED ORGANISM: ICD-10-CM

## 2023-04-25 DIAGNOSIS — J02.9 VIRAL PHARYNGITIS: Primary | ICD-10-CM

## 2023-04-25 LAB
INFLUENZA A BY PCR: NEGATIVE
INFLUENZA B BY PCR: NEGATIVE
SARS-COV-2 RDRP RESP QL NAA+PROBE: NOT DETECTED
STREP GRP A PCR: NEGATIVE

## 2023-04-25 PROCEDURE — 87502 INFLUENZA DNA AMP PROBE: CPT

## 2023-04-25 PROCEDURE — 6370000000 HC RX 637 (ALT 250 FOR IP): Performed by: EMERGENCY MEDICINE

## 2023-04-25 PROCEDURE — 99283 EMERGENCY DEPT VISIT LOW MDM: CPT

## 2023-04-25 PROCEDURE — 87635 SARS-COV-2 COVID-19 AMP PRB: CPT

## 2023-04-25 PROCEDURE — 87651 STREP A DNA AMP PROBE: CPT

## 2023-04-25 RX ORDER — GUAIFENESIN 600 MG/1
1200 TABLET, EXTENDED RELEASE ORAL 2 TIMES DAILY
Qty: 40 TABLET | Refills: 0 | Status: SHIPPED | OUTPATIENT
Start: 2023-04-25 | End: 2023-04-28 | Stop reason: ALTCHOICE

## 2023-04-25 RX ORDER — LIDOCAINE HYDROCHLORIDE 20 MG/ML
15 SOLUTION OROPHARYNGEAL
Qty: 200 ML | Refills: 0 | Status: SHIPPED | OUTPATIENT
Start: 2023-04-25

## 2023-04-25 RX ORDER — DEXAMETHASONE 4 MG/1
4 TABLET ORAL 2 TIMES DAILY WITH MEALS
Qty: 20 TABLET | Refills: 0 | Status: SHIPPED | OUTPATIENT
Start: 2023-04-25 | End: 2023-04-28 | Stop reason: ALTCHOICE

## 2023-04-25 RX ORDER — LIDOCAINE HYDROCHLORIDE 20 MG/ML
15 SOLUTION OROPHARYNGEAL ONCE
Status: COMPLETED | OUTPATIENT
Start: 2023-04-25 | End: 2023-04-25

## 2023-04-25 RX ADMIN — LIDOCAINE HYDROCHLORIDE 15 ML: 20 SOLUTION ORAL at 19:08

## 2023-04-25 ASSESSMENT — ENCOUNTER SYMPTOMS
SHORTNESS OF BREATH: 0
VOMITING: 0
BACK PAIN: 0
APNEA: 0
NAUSEA: 0
PHOTOPHOBIA: 0
ABDOMINAL PAIN: 0
CONSTIPATION: 0
SORE THROAT: 1
ABDOMINAL DISTENTION: 0
DIARRHEA: 0
COUGH: 1
COLOR CHANGE: 0
EYE PAIN: 0
WHEEZING: 0
RHINORRHEA: 0
SINUS PRESSURE: 0

## 2023-04-25 ASSESSMENT — PAIN - FUNCTIONAL ASSESSMENT: PAIN_FUNCTIONAL_ASSESSMENT: 0-10

## 2023-04-25 ASSESSMENT — PAIN DESCRIPTION - PAIN TYPE: TYPE: ACUTE PAIN

## 2023-04-25 ASSESSMENT — PAIN SCALES - GENERAL: PAINLEVEL_OUTOF10: 8

## 2023-04-25 ASSESSMENT — PAIN DESCRIPTION - LOCATION: LOCATION: THROAT

## 2023-04-25 NOTE — ED PROVIDER NOTES
edema. Left lower leg: No edema. Lymphadenopathy:      Cervical: No cervical adenopathy. Skin:     General: Skin is warm and dry. Capillary Refill: Capillary refill takes less than 2 seconds. Coloration: Skin is not jaundiced or pale. Findings: No bruising, erythema, lesion or rash. Neurological:      General: No focal deficit present. Mental Status: She is alert and oriented to person, place, and time. Mental status is at baseline. Cranial Nerves: No cranial nerve deficit. Sensory: No sensory deficit. Motor: No weakness or abnormal muscle tone. Coordination: Coordination normal.      Gait: Gait normal.      Deep Tendon Reflexes: Reflexes are normal and symmetric. Reflexes normal.   Psychiatric:         Mood and Affect: Mood normal.         Behavior: Behavior normal.         Thought Content: Thought content normal.         Judgment: Judgment normal.       DIAGNOSTIC RESULTS     EKG: All EKG's are interpreted by the Emergency Department Physician who either signs or Co-signs this chart in the absence of a cardiologist.        RADIOLOGY:   Non-plain film images such as CT, Ultrasound and MRI are read by the radiologist. Eufemia Castano radiographicimages are visualized and preliminarily interpreted by the emergency physician with the below findings:        Interpretation per the Radiologist below, if available at the time of this note:    No orders to display         ED BEDSIDE ULTRASOUND:   Performed by ED Physician - none    LABS:  Labs Reviewed   COVID-19, RAPID   RAPID INFLUENZA A/B ANTIGENS   RAPID STREP SCREEN       All other labs were within normal range or not returned as of this dictation.     EMERGENCY DEPARTMENT COURSE and DIFFERENTIALDIAGNOSIS/MDM:   Vitals:    Vitals:    04/25/23 1848   BP: 117/67   Pulse: (!) 103   Resp: 20   Temp: 97.9 °F (36.6 °C)   SpO2: 99%   Weight: 183 lb (83 kg)   Height: 5' 4\" (1.626 m)           MDM     Amount and/or Complexity of Data

## 2023-04-25 NOTE — ED TRIAGE NOTES
Pt arrives to ER with complaints of sore throat X 2 weeks, pt sttes it has been getting worse and is accompanied by non productive cough/ pt is smoker and and denies any fevers.

## 2023-04-26 DIAGNOSIS — D64.9 ANEMIA, UNSPECIFIED TYPE: ICD-10-CM

## 2023-04-26 RX ORDER — EPINEPHRINE 0.3 MG/.3ML
INJECTION SUBCUTANEOUS
Qty: 1 EACH | Refills: 0 | Status: SHIPPED | OUTPATIENT
Start: 2023-04-26

## 2023-04-26 RX ORDER — DOXYCYCLINE HYCLATE 50 MG/1
324 CAPSULE, GELATIN COATED ORAL
Qty: 30 TABLET | Refills: 0 | Status: SHIPPED | OUTPATIENT
Start: 2023-04-26 | End: 2023-04-28 | Stop reason: SDUPTHER

## 2023-04-26 RX ORDER — LEVOTHYROXINE SODIUM 0.05 MG/1
50 TABLET ORAL DAILY
Qty: 30 TABLET | Refills: 0 | OUTPATIENT
Start: 2023-04-26

## 2023-04-26 RX ORDER — ACYCLOVIR 400 MG/1
TABLET ORAL
Qty: 60 TABLET | Refills: 0 | Status: SHIPPED | OUTPATIENT
Start: 2023-04-26 | End: 2023-04-28 | Stop reason: SDUPTHER

## 2023-04-26 NOTE — ED NOTES
Pt discharged per physician order, Pt denies questions at this time. Pt ambulated to lobby with steady gait. No distress noted. Follow up appointment, and prescriptions discussed with patient.        Nelly Denis RN  04/25/23 2017

## 2023-04-26 NOTE — TELEPHONE ENCOUNTER
----- Message from [facility] sent at 4/26/2023 12:01 PM EDT -----  Subject: Refill Request    QUESTIONS  Name of Medication? levothyroxine (SYNTHROID) 50 MCG tablet  Patient-reported dosage and instructions? 75 mg, once daily  How many days do you have left? 0  Preferred Pharmacy? [facility] phone number (if available)? [phone]  ---------------------------------------------------------------------------  --------------,  Name of Medication? ferrous sulfate 324 (65 Fe) MG EC tablet  Patient-reported dosage and instructions? once daily  How many days do you have left? 4  Preferred Pharmacy? [facility] phone number (if available)? [phone]  ---------------------------------------------------------------------------  --------------,  Name of Medication? EPINEPHrine (EPIPEN 2-SANTHOSH) 0.3 MG/0.3ML SOAJ injection  Patient-reported dosage and instructions? as needed  How many days do you have left? 0  Preferred Pharmacy? [facility] phone number (if available)? [phone]  ---------------------------------------------------------------------------  --------------,  Name of Medication? acyclovir (ZOVIRAX) 400 MG tablet  Patient-reported dosage and instructions? twice daily  How many days do you have left? 4  Preferred Pharmacy? [facility] phone number (if available)? [phone]  Additional Information for Provider? Next appointment 04/28/2023 VV  ---------------------------------------------------------------------------  --------------  CALL BACK INFO  What is the best way for the office to contact you? OK to leave message on   voicemail  Preferred Call Back Phone Number? [phone]  ---------------------------------------------------------------------------  --------------  SCRIPT ANSWERS  Relationship to Patient?  Self

## 2023-04-27 PROBLEM — E03.9 HYPOTHYROIDISM: Status: ACTIVE | Noted: 2023-01-26

## 2023-04-27 PROBLEM — F41.9 ANXIETY: Status: ACTIVE | Noted: 2023-04-27

## 2023-04-27 PROBLEM — E55.9 VITAMIN D DEFICIENCY: Status: ACTIVE | Noted: 2017-06-26

## 2023-04-27 PROBLEM — F31.30 BIPOLAR DISORDER, MOST RECENT EPISODE DEPRESSED (HCC): Status: ACTIVE | Noted: 2022-06-25

## 2023-04-27 PROBLEM — B00.9 HERPES SIMPLEX: Status: ACTIVE | Noted: 2023-04-27

## 2023-04-27 PROBLEM — G43.909 MIGRAINE HEADACHE: Status: ACTIVE | Noted: 2022-08-15

## 2023-04-27 PROBLEM — L98.9 FINGER LESION: Status: ACTIVE | Noted: 2023-04-27

## 2023-04-27 PROBLEM — E78.5 HYPERLIPIDEMIA: Status: ACTIVE | Noted: 2022-05-06

## 2023-04-27 PROBLEM — F31.9 BIPOLAR DISORDER (HCC): Status: ACTIVE | Noted: 2022-06-25

## 2023-04-27 PROBLEM — F25.0 SCHIZOAFFECTIVE DISORDER, BIPOLAR TYPE (HCC): Status: ACTIVE | Noted: 2021-12-31

## 2023-04-27 PROBLEM — L68.0 HIRSUTISM: Status: ACTIVE | Noted: 2022-05-06

## 2023-04-27 PROBLEM — K21.00 GASTROESOPHAGEAL REFLUX DISEASE WITH ESOPHAGITIS WITHOUT HEMORRHAGE: Status: ACTIVE | Noted: 2023-04-27

## 2023-04-28 ENCOUNTER — TELEMEDICINE (OUTPATIENT)
Dept: FAMILY MEDICINE CLINIC | Age: 38
End: 2023-04-28
Payer: COMMERCIAL

## 2023-04-28 DIAGNOSIS — Z86.19 HISTORY OF HERPES GENITALIS: ICD-10-CM

## 2023-04-28 DIAGNOSIS — K27.9 PUD (PEPTIC ULCER DISEASE): ICD-10-CM

## 2023-04-28 DIAGNOSIS — D64.9 ANEMIA, UNSPECIFIED TYPE: ICD-10-CM

## 2023-04-28 DIAGNOSIS — E03.8 OTHER SPECIFIED HYPOTHYROIDISM: Primary | ICD-10-CM

## 2023-04-28 DIAGNOSIS — Z85.850 HISTORY OF THYROID CANCER: ICD-10-CM

## 2023-04-28 PROCEDURE — 99442 PR PHYS/QHP TELEPHONE EVALUATION 11-20 MIN: CPT | Performed by: FAMILY MEDICINE

## 2023-04-28 RX ORDER — DOXYCYCLINE HYCLATE 50 MG/1
324 CAPSULE, GELATIN COATED ORAL
Qty: 90 TABLET | Refills: 0 | Status: SHIPPED | OUTPATIENT
Start: 2023-04-28 | End: 2023-07-27

## 2023-04-28 RX ORDER — LEVOTHYROXINE SODIUM 0.05 MG/1
50 TABLET ORAL DAILY
Qty: 30 TABLET | Refills: 1 | Status: CANCELLED | OUTPATIENT
Start: 2023-04-28 | End: 2023-07-27

## 2023-04-28 RX ORDER — ACYCLOVIR 400 MG/1
TABLET ORAL
Qty: 60 TABLET | Refills: 5 | Status: SHIPPED | OUTPATIENT
Start: 2023-04-28

## 2023-04-28 RX ORDER — LEVOTHYROXINE SODIUM 0.07 MG/1
75 TABLET ORAL DAILY
Qty: 30 TABLET | Refills: 0 | Status: SHIPPED | OUTPATIENT
Start: 2023-04-28

## 2023-04-28 RX ORDER — OMEPRAZOLE 20 MG/1
CAPSULE, DELAYED RELEASE ORAL
Qty: 90 CAPSULE | Refills: 0 | Status: SHIPPED | OUTPATIENT
Start: 2023-04-28

## 2023-04-28 RX ORDER — EPINEPHRINE 0.3 MG/.3ML
INJECTION SUBCUTANEOUS
Qty: 1 EACH | Refills: 5 | Status: CANCELLED | OUTPATIENT
Start: 2023-04-28

## 2023-04-28 SDOH — ECONOMIC STABILITY: HOUSING INSECURITY
IN THE LAST 12 MONTHS, WAS THERE A TIME WHEN YOU DID NOT HAVE A STEADY PLACE TO SLEEP OR SLEPT IN A SHELTER (INCLUDING NOW)?: NO

## 2023-04-28 SDOH — ECONOMIC STABILITY: FOOD INSECURITY: WITHIN THE PAST 12 MONTHS, YOU WORRIED THAT YOUR FOOD WOULD RUN OUT BEFORE YOU GOT MONEY TO BUY MORE.: SOMETIMES TRUE

## 2023-04-28 SDOH — ECONOMIC STABILITY: INCOME INSECURITY: HOW HARD IS IT FOR YOU TO PAY FOR THE VERY BASICS LIKE FOOD, HOUSING, MEDICAL CARE, AND HEATING?: NOT HARD AT ALL

## 2023-04-28 SDOH — ECONOMIC STABILITY: FOOD INSECURITY: WITHIN THE PAST 12 MONTHS, THE FOOD YOU BOUGHT JUST DIDN'T LAST AND YOU DIDN'T HAVE MONEY TO GET MORE.: SOMETIMES TRUE

## 2023-04-28 ASSESSMENT — PATIENT HEALTH QUESTIONNAIRE - PHQ9
9. THOUGHTS THAT YOU WOULD BE BETTER OFF DEAD, OR OF HURTING YOURSELF: 0
SUM OF ALL RESPONSES TO PHQ9 QUESTIONS 1 & 2: 2
7. TROUBLE CONCENTRATING ON THINGS, SUCH AS READING THE NEWSPAPER OR WATCHING TELEVISION: 3
8. MOVING OR SPEAKING SO SLOWLY THAT OTHER PEOPLE COULD HAVE NOTICED. OR THE OPPOSITE, BEING SO FIGETY OR RESTLESS THAT YOU HAVE BEEN MOVING AROUND A LOT MORE THAN USUAL: 0
4. FEELING TIRED OR HAVING LITTLE ENERGY: 3
SUM OF ALL RESPONSES TO PHQ QUESTIONS 1-9: 11
SUM OF ALL RESPONSES TO PHQ QUESTIONS 1-9: 11
5. POOR APPETITE OR OVEREATING: 0
6. FEELING BAD ABOUT YOURSELF - OR THAT YOU ARE A FAILURE OR HAVE LET YOURSELF OR YOUR FAMILY DOWN: 0
10. IF YOU CHECKED OFF ANY PROBLEMS, HOW DIFFICULT HAVE THESE PROBLEMS MADE IT FOR YOU TO DO YOUR WORK, TAKE CARE OF THINGS AT HOME, OR GET ALONG WITH OTHER PEOPLE: 0
SUM OF ALL RESPONSES TO PHQ QUESTIONS 1-9: 11
2. FEELING DOWN, DEPRESSED OR HOPELESS: 1
3. TROUBLE FALLING OR STAYING ASLEEP: 3
1. LITTLE INTEREST OR PLEASURE IN DOING THINGS: 1
SUM OF ALL RESPONSES TO PHQ QUESTIONS 1-9: 11

## 2023-04-28 ASSESSMENT — ENCOUNTER SYMPTOMS
SHORTNESS OF BREATH: 0
VOMITING: 0
VOICE CHANGE: 1

## 2023-04-28 NOTE — PATIENT INSTRUCTIONS
FOOD RESOURCES    SNAP:  What they offer:  Helps low-income adults and families stretch their monthly food budgets and buy healthy food  Phone Number: 851.360.6288  Website: Collections/financial-support-services/food-assistance  Meals on [facility] office on aging:  What they offer: Home delivered meals, restaurant voucher program, senior food box program and emergency food pantry. Phone Number:  740.563.9551  Website: https://eefoof.com/cherry-services/nutrition  Second [facility] bank:  What they offer: Will provide a list of available food pantries in the area weekly. Phone Number: [phone]  Website: [employer]    Need additional resources? Call 211   Find Help https://www. findhelp.org

## 2023-04-28 NOTE — PROGRESS NOTES
Subjective:      Patient ID: Trever James is a 40 y.o. female    HPI  Here in follow up for anemia and gastritis/pud and genital herpes. No episodes of genital herpes since last time taking medication    Ppi working well for gastritis. Still taking iron supplement. Requesting refill on synthroid as wants to a different specialist than whom she has been seeing. No missed doses of medication   . Review of Systems   Constitutional:  Negative for unexpected weight change. HENT:  Positive for voice change. Respiratory:  Negative for shortness of breath. Gastrointestinal:  Negative for vomiting. Reviewed allergy, medical, social, surgical, family and med list changes and updated   Files   Trever James is a 40 y.o. female evaluated via telephone on 4/28/2023. Consent:  She and/or health care decision maker is aware that that she may receive a bill for this telephone service, depending on her insurance coverage, and has provided verbal consent to proceed: Yes      Documentation:  I communicated with the patient and/or health care decision maker about . Details of this discussion including any medical advice provided: This visit was a telephone encounter. Patient was located at their home. Provider was located at their ___ home or        __x__ office. I affirm this is a Patient Initiated Episode with an Established Patient who has not had a related appointment within my department in the past 7 days or scheduled within the next 24 hours.     Total Time: minutes: 11-20 minutes    Note: not billable if this call serves to triage the patient into an appointment for the relevant concern      Naida Libman, MD    Social History     Socioeconomic History    Marital status: Single   Tobacco Use    Smoking status: Never    Smokeless tobacco: Never   Vaping Use    Vaping Use: Some days    Substances: Nicotine    Devices: Pre-filled or refillable cartridge   Substance and Sexual Activity

## 2023-05-05 RX ORDER — EPINEPHRINE 0.3 MG/.3ML
INJECTION SUBCUTANEOUS
Refills: 10 | OUTPATIENT
Start: 2023-05-05

## 2023-05-05 NOTE — TELEPHONE ENCOUNTER
Please approve or deny request. Thank you! Rx requested:  Requested Prescriptions     Pending Prescriptions Disp Refills    EPINEPHrine (EPIPEN) 0.3 MG/0.3ML SOAJ injection [Pharmacy Med Name: EPINEPHRINE 0.3MG/0.3ML *2 0.3 Solution Auto-injector]  10     Sig: USE AS DIRECTED FOR ALLERGIC REACTION         Last Office Visit:   4/28/2023      Next Visit Date:  No future appointments.

## 2023-05-11 RX ORDER — EPINEPHRINE 0.3 MG/.3ML
INJECTION SUBCUTANEOUS
Qty: 2 EACH | Refills: 0 | OUTPATIENT
Start: 2023-05-11

## 2023-05-15 ENCOUNTER — TELEPHONE (OUTPATIENT)
Dept: FAMILY MEDICINE CLINIC | Age: 38
End: 2023-05-15

## 2023-05-16 DIAGNOSIS — Z91.030 BEE ALLERGY STATUS: Primary | ICD-10-CM

## 2023-05-17 RX ORDER — EPINEPHRINE 0.3 MG/.3ML
INJECTION SUBCUTANEOUS
Qty: 2 EACH | Refills: 0 | Status: SHIPPED | OUTPATIENT
Start: 2023-05-17

## 2023-05-24 ENCOUNTER — TELEPHONE (OUTPATIENT)
Dept: FAMILY MEDICINE CLINIC | Age: 38
End: 2023-05-24

## 2023-05-24 NOTE — TELEPHONE ENCOUNTER
Calvin Champion from Dr. Kali Padron office calling and would like the lab work faxed to them. Please fax to [phone].

## 2023-06-09 RX ORDER — FERROUS SULFATE 325(65) MG
TABLET ORAL
Qty: 30 TABLET | Refills: 0 | Status: SHIPPED | OUTPATIENT
Start: 2023-06-09 | End: 2023-07-11

## 2023-06-09 NOTE — TELEPHONE ENCOUNTER
Please approve or deny request. Thank you! Rx requested:  Requested Prescriptions     Pending Prescriptions Disp Refills    FEROSUL 325 (65 Fe) MG tablet [Pharmacy Med Name: FERROUS SULF 325MG TAB  (65 FE) Tablet] 30 tablet 10     Sig: TAKE 1 TABLET BY MOUTH DAILY WITH BREAKFAST         Last Office Visit:   4/28/2023      Next Visit Date:  No future appointments.

## 2023-07-11 RX ORDER — FERROUS SULFATE 325(65) MG
TABLET ORAL
Qty: 30 TABLET | Refills: 0 | Status: SHIPPED | OUTPATIENT
Start: 2023-07-11 | End: 2023-08-09

## 2023-08-09 ENCOUNTER — TELEPHONE (OUTPATIENT)
Dept: FAMILY MEDICINE CLINIC | Age: 38
End: 2023-08-09

## 2023-08-09 RX ORDER — FERROUS SULFATE 325(65) MG
TABLET ORAL
Qty: 30 TABLET | Refills: 0 | Status: SHIPPED | OUTPATIENT
Start: 2023-08-09 | End: 2023-09-08

## 2023-08-09 NOTE — TELEPHONE ENCOUNTER
I called patient to schedule a follow up appointment and she is asking if it can be a VV? Patient hasn't been seen in office since 7/21.

## 2023-08-09 NOTE — TELEPHONE ENCOUNTER
Rx requested:  Requested Prescriptions     Pending Prescriptions Disp Refills    FEROSUL 325 (65 Fe) MG tablet [Pharmacy Med Name: FERROUS SULF 325MG TAB  (65 FE) Tablet] 30 tablet 0     Sig: TAKE 1 TABLET BY MOUTH DAILY WITH BREAKFAST         Last Office Visit:   4/28/2023      Next Visit Date:  No future appointments.

## 2023-08-16 RX ORDER — OMEPRAZOLE 20 MG/1
20 CAPSULE, DELAYED RELEASE ORAL DAILY
Qty: 30 CAPSULE | Refills: 0 | Status: SHIPPED | OUTPATIENT
Start: 2023-08-16 | End: 2023-09-11

## 2023-09-08 RX ORDER — FERROUS SULFATE 325(65) MG
TABLET ORAL
Qty: 30 TABLET | Refills: 0 | Status: SHIPPED | OUTPATIENT
Start: 2023-09-08

## 2023-09-11 RX ORDER — OMEPRAZOLE 20 MG/1
20 CAPSULE, DELAYED RELEASE ORAL DAILY
Qty: 30 CAPSULE | Refills: 0 | Status: SHIPPED | OUTPATIENT
Start: 2023-09-11

## 2023-10-07 NOTE — TELEPHONE ENCOUNTER
Pharmacy requesting medication refill. Please approve or deny this request.    Rx requested:  Requested Prescriptions     Pending Prescriptions Disp Refills    omeprazole (PRILOSEC) 20 MG delayed release capsule [Pharmacy Med Name: OMEPRAZOLE DR 20MG CAP 20 Capsule] 30 capsule 10     Sig: TAKE 1 CAPSULE BY MOUTH DAILY    FEROSUL 325 (65 Fe) MG tablet [Pharmacy Med Name: FERROUS SULF 325MG TAB  (65 FE) Tablet] 30 tablet 10     Sig: TAKE 1 TABLET BY MOUTH DAILY WITH BREAKFAST         Last Office Visit:   4/28/2023      Next Visit Date:  No future appointments.

## 2023-10-09 RX ORDER — OMEPRAZOLE 20 MG/1
20 CAPSULE, DELAYED RELEASE ORAL DAILY
Qty: 30 CAPSULE | Refills: 0 | Status: SHIPPED | OUTPATIENT
Start: 2023-10-09

## 2023-10-09 RX ORDER — FERROUS SULFATE 325(65) MG
TABLET ORAL
Qty: 30 TABLET | Refills: 0 | Status: SHIPPED | OUTPATIENT
Start: 2023-10-09

## 2023-11-07 RX ORDER — FERROUS SULFATE 325(65) MG
TABLET ORAL
Qty: 30 TABLET | Refills: 10 | OUTPATIENT
Start: 2023-11-07

## 2023-11-07 RX ORDER — OMEPRAZOLE 20 MG/1
20 CAPSULE, DELAYED RELEASE ORAL DAILY
Qty: 30 CAPSULE | Refills: 10 | OUTPATIENT
Start: 2023-11-07

## 2024-01-09 RX ORDER — ACYCLOVIR 400 MG/1
TABLET ORAL
Qty: 60 TABLET | Refills: 0 | Status: SHIPPED | OUTPATIENT
Start: 2024-01-09

## 2024-01-31 ENCOUNTER — HOSPITAL ENCOUNTER (OUTPATIENT)
Dept: LAB | Age: 39
Discharge: HOME | End: 2024-01-31
Payer: COMMERCIAL

## 2024-01-31 LAB — GONADOTROPIN, CHORIONIC (HCG) QUANT: 15.8 MIU/ML

## 2024-01-31 PROCEDURE — 84702 CHORIONIC GONADOTROPIN TEST: CPT

## 2024-01-31 PROCEDURE — 36415 COLL VENOUS BLD VENIPUNCTURE: CPT

## 2024-02-04 ENCOUNTER — OFFICE VISIT (OUTPATIENT)
Dept: FAMILY MEDICINE CLINIC | Age: 39
End: 2024-02-04
Payer: COMMERCIAL

## 2024-02-04 VITALS
SYSTOLIC BLOOD PRESSURE: 110 MMHG | DIASTOLIC BLOOD PRESSURE: 70 MMHG | HEIGHT: 64 IN | RESPIRATION RATE: 16 BRPM | OXYGEN SATURATION: 98 % | WEIGHT: 223 LBS | TEMPERATURE: 98.4 F | HEART RATE: 68 BPM | BODY MASS INDEX: 38.07 KG/M2

## 2024-02-04 DIAGNOSIS — K13.79 MOUTH SORES: ICD-10-CM

## 2024-02-04 DIAGNOSIS — Q18.1 CYST ON EAR: ICD-10-CM

## 2024-02-04 DIAGNOSIS — K14.9 TONGUE IRRITATION: Primary | ICD-10-CM

## 2024-02-04 PROCEDURE — G8484 FLU IMMUNIZE NO ADMIN: HCPCS | Performed by: NURSE PRACTITIONER

## 2024-02-04 PROCEDURE — 1036F TOBACCO NON-USER: CPT | Performed by: NURSE PRACTITIONER

## 2024-02-04 PROCEDURE — G8427 DOCREV CUR MEDS BY ELIG CLIN: HCPCS | Performed by: NURSE PRACTITIONER

## 2024-02-04 PROCEDURE — G8417 CALC BMI ABV UP PARAM F/U: HCPCS | Performed by: NURSE PRACTITIONER

## 2024-02-04 PROCEDURE — 99213 OFFICE O/P EST LOW 20 MIN: CPT | Performed by: NURSE PRACTITIONER

## 2024-02-04 RX ORDER — LEVOTHYROXINE SODIUM 0.1 MG/1
100 TABLET ORAL DAILY
COMMUNITY
Start: 2024-01-08

## 2024-02-04 RX ORDER — GABAPENTIN 100 MG/1
CAPSULE ORAL
COMMUNITY
Start: 2024-02-01

## 2024-02-04 RX ORDER — LAMOTRIGINE 25 MG/1
50 TABLET ORAL DAILY
COMMUNITY
Start: 2024-02-01

## 2024-02-04 RX ORDER — METFORMIN HYDROCHLORIDE 500 MG/1
TABLET, EXTENDED RELEASE ORAL
COMMUNITY
Start: 2024-01-13

## 2024-02-04 RX ORDER — HYDROXYZINE HYDROCHLORIDE 25 MG/1
TABLET, FILM COATED ORAL
COMMUNITY
Start: 2024-02-01

## 2024-02-04 RX ORDER — PHENOBARBITAL 30 MG/1
TABLET ORAL
COMMUNITY
Start: 2023-08-24

## 2024-02-07 RX ORDER — ACYCLOVIR 400 MG/1
TABLET ORAL
Qty: 60 TABLET | Refills: 0 | Status: SHIPPED | OUTPATIENT
Start: 2024-02-07

## 2024-02-22 NOTE — TELEPHONE ENCOUNTER
Left voicemail to please call into office at [phone] to schedule an appointment.    [Adequate] : adequate [Use of Plain Language] : use of plain language [] : I have reviewed management goals with caretaker and provided a copy of care plan [None] : none

## 2024-03-06 ENCOUNTER — OFFICE VISIT (OUTPATIENT)
Dept: FAMILY MEDICINE CLINIC | Age: 39
End: 2024-03-06
Payer: COMMERCIAL

## 2024-03-06 VITALS
OXYGEN SATURATION: 99 % | BODY MASS INDEX: 36.84 KG/M2 | DIASTOLIC BLOOD PRESSURE: 74 MMHG | RESPIRATION RATE: 16 BRPM | HEIGHT: 64 IN | HEART RATE: 80 BPM | WEIGHT: 215.8 LBS | TEMPERATURE: 98 F | SYSTOLIC BLOOD PRESSURE: 118 MMHG

## 2024-03-06 DIAGNOSIS — H10.12 ALLERGIC CONJUNCTIVITIS OF LEFT EYE: Primary | ICD-10-CM

## 2024-03-06 DIAGNOSIS — K14.9 TONGUE IRRITATION: ICD-10-CM

## 2024-03-06 DIAGNOSIS — K13.79 MOUTH SORES: ICD-10-CM

## 2024-03-06 PROCEDURE — G8484 FLU IMMUNIZE NO ADMIN: HCPCS | Performed by: NURSE PRACTITIONER

## 2024-03-06 PROCEDURE — 1036F TOBACCO NON-USER: CPT | Performed by: NURSE PRACTITIONER

## 2024-03-06 PROCEDURE — G8417 CALC BMI ABV UP PARAM F/U: HCPCS | Performed by: NURSE PRACTITIONER

## 2024-03-06 PROCEDURE — G8427 DOCREV CUR MEDS BY ELIG CLIN: HCPCS | Performed by: NURSE PRACTITIONER

## 2024-03-06 PROCEDURE — 99213 OFFICE O/P EST LOW 20 MIN: CPT | Performed by: NURSE PRACTITIONER

## 2024-03-06 RX ORDER — PSEUDOEPHEDRINE HCL 30 MG
100 TABLET ORAL 2 TIMES DAILY PRN
COMMUNITY
Start: 2016-11-03

## 2024-03-06 RX ORDER — ARIPIPRAZOLE 15 MG/1
TABLET ORAL
COMMUNITY
Start: 2024-02-06

## 2024-03-06 RX ORDER — ESCITALOPRAM OXALATE 10 MG/1
10 TABLET ORAL DAILY
COMMUNITY
Start: 2016-10-11

## 2024-03-06 RX ORDER — AZELASTINE HYDROCHLORIDE 0.5 MG/ML
1 SOLUTION/ DROPS OPHTHALMIC 2 TIMES DAILY
Qty: 1 EACH | Refills: 1 | Status: SHIPPED | OUTPATIENT
Start: 2024-03-06

## 2024-03-06 RX ORDER — OLANZAPINE 2.5 MG/1
TABLET, FILM COATED ORAL
COMMUNITY
Start: 2024-02-06

## 2024-03-06 ASSESSMENT — ENCOUNTER SYMPTOMS
SORE THROAT: 0
COUGH: 0
EYE ITCHING: 1
EYE REDNESS: 1
EYE DISCHARGE: 1

## 2024-03-06 NOTE — PROGRESS NOTES
Soledad Mejia (:  1985) is a 38 y.o. female, Established patient, here for evaluation of the following chief complaint(s):  OTHER ( Pt complaints of itchiness and pain in left eye. Does wear contact lenses. Started 2 days ago. Pt also is here for mouth pain. Describes as a prickly feeling in her mouth. )      Vitals:    24 1041   BP: 118/74   Pulse: 80   Resp: 16   Temp: 98 °F (36.7 °C)   SpO2: 99%       ASSESSMENT/PLAN:  1. Allergic conjunctivitis of left eye  -     azelastine (OPTIVAR) 0.05 % ophthalmic solution; Place 1 drop into the left eye 2 times daily, Disp-1 each, R-1Normal  2. Tongue irritation  -     Magic Mouthwash (MIRACLE MOUTHWASH); Swish and spit 5 mLs 4 times daily as needed for Irritation, Disp-200 mL, R-01:1:1 solutionNormal  3. Mouth sores  -     Magic Mouthwash (MIRACLE MOUTHWASH); Swish and spit 5 mLs 4 times daily as needed for Irritation, Disp-200 mL, R-01:1:1 solutionNormal        -     2nd occurrence (pt seen on 24 for the same)        -     continue taking valtrex 2x daily        -     f/u with PCP for further evaluation      Return if symptoms worsen or fail to improve.      SUBJECTIVE/OBJECTIVE:    Other  This is a new problem. Episode onset: itchy painful left eye, wear contacts.  pt also has a sore on her tongue, was treated x1 mo ago and now its back. The problem occurs constantly. The problem has been gradually worsening. Pertinent negatives include no arthralgias, chills, coughing, fever, headaches or sore throat. The symptoms are aggravated by eating. Treatments tried: nothing for the eyes, but used Magic Mouthwash for tongue with improvement.       Review of Systems   Constitutional:  Negative for chills and fever.   HENT:  Positive for mouth sores. Negative for sore throat.    Eyes:  Positive for discharge (left eye), redness (left eye) and itching (left eye).   Respiratory:  Negative for cough.    Musculoskeletal:  Negative for arthralgias.

## 2024-03-07 RX ORDER — ACYCLOVIR 400 MG/1
TABLET ORAL
Qty: 60 TABLET | Refills: 0 | Status: SHIPPED | OUTPATIENT
Start: 2024-03-07 | End: 2024-04-05

## 2024-03-07 NOTE — TELEPHONE ENCOUNTER
Called patient, she hung up after I announced I was from Dr. Baron's office and would like to schedule an appointment

## 2024-04-05 RX ORDER — ACYCLOVIR 400 MG/1
TABLET ORAL
Qty: 60 TABLET | Refills: 0 | Status: SHIPPED | OUTPATIENT
Start: 2024-04-05

## 2024-04-05 NOTE — TELEPHONE ENCOUNTER
Please approve or deny request. Thank you!    Rx requested:  Requested Prescriptions     Pending Prescriptions Disp Refills    acyclovir (ZOVIRAX) 400 MG tablet [Pharmacy Med Name: ACYCLOVIR 400 MG TABS 400 Tablet] 60 tablet 0     Sig: TAKE 1 TABLET BY MOUTH TWICE DAILY         Last Office Visit:   4/28/2023      Next Visit Date:  No future appointments.

## 2024-05-06 RX ORDER — ACYCLOVIR 400 MG/1
TABLET ORAL
Qty: 60 TABLET | Refills: 10 | OUTPATIENT
Start: 2024-05-06

## (undated) DEVICE — Device: Brand: ENDO SMARTCAP

## (undated) DEVICE — ENDO CARRY-ON PROCEDURE KIT: Brand: ENDO CARRY-ON PROCEDURE KIT

## (undated) DEVICE — SINGLE PORT MANIFOLD: Brand: NEPTUNE 2

## (undated) DEVICE — CONMED SCOPE SAVER BITE BLOCK, 20X27 MM: Brand: SCOPE SAVER

## (undated) DEVICE — TUBING, SUCTION, 1/4" X 10', STRAIGHT: Brand: MEDLINE

## (undated) DEVICE — TUBE SET 96 MM 64 MM H2O PERISTALTIC STD AUX CHANNEL

## (undated) DEVICE — BRUSH ENDO CLN L90.5IN SHTH DIA1.7MM BRIST DIA5-7MM 2-6MM